# Patient Record
Sex: MALE | Race: WHITE | NOT HISPANIC OR LATINO | Employment: OTHER | URBAN - METROPOLITAN AREA
[De-identification: names, ages, dates, MRNs, and addresses within clinical notes are randomized per-mention and may not be internally consistent; named-entity substitution may affect disease eponyms.]

---

## 2017-04-19 ENCOUNTER — TELEPHONE (OUTPATIENT)
Dept: INTERNAL MEDICINE | Facility: CLINIC | Age: 82
End: 2017-04-19

## 2017-04-27 ENCOUNTER — TELEPHONE (OUTPATIENT)
Dept: INTERNAL MEDICINE | Facility: CLINIC | Age: 82
End: 2017-04-27

## 2017-05-08 DIAGNOSIS — N40.0 BPH (BENIGN PROSTATIC HYPERTROPHY): ICD-10-CM

## 2017-05-08 DIAGNOSIS — M51.9 LUMBAR DISC DISEASE: ICD-10-CM

## 2017-05-08 DIAGNOSIS — E78.5 HLD (HYPERLIPIDEMIA): ICD-10-CM

## 2017-05-08 RX ORDER — LANSOPRAZOLE 30 MG/1
TABLET, ORALLY DISINTEGRATING, DELAYED RELEASE ORAL
Qty: 90 TABLET | Refills: 2 | Status: SHIPPED | OUTPATIENT
Start: 2017-05-08 | End: 2017-08-30

## 2017-05-08 RX ORDER — CARVEDILOL PHOSPHATE 20 MG/1
CAPSULE, EXTENDED RELEASE ORAL
Qty: 90 CAPSULE | Refills: 2 | Status: SHIPPED | OUTPATIENT
Start: 2017-05-08 | End: 2017-07-10

## 2017-05-22 ENCOUNTER — TELEPHONE (OUTPATIENT)
Dept: INTERNAL MEDICINE | Facility: CLINIC | Age: 82
End: 2017-05-22

## 2017-05-22 DIAGNOSIS — N18.30 CONTROLLED TYPE 2 DIABETES MELLITUS WITH STAGE 3 CHRONIC KIDNEY DISEASE, WITH LONG-TERM CURRENT USE OF INSULIN: Primary | ICD-10-CM

## 2017-05-22 DIAGNOSIS — Z79.4 CONTROLLED TYPE 2 DIABETES MELLITUS WITH STAGE 3 CHRONIC KIDNEY DISEASE, WITH LONG-TERM CURRENT USE OF INSULIN: Primary | ICD-10-CM

## 2017-05-22 DIAGNOSIS — E11.22 CONTROLLED TYPE 2 DIABETES MELLITUS WITH STAGE 3 CHRONIC KIDNEY DISEASE, WITH LONG-TERM CURRENT USE OF INSULIN: Primary | ICD-10-CM

## 2017-06-05 DIAGNOSIS — I10 ESSENTIAL HYPERTENSION: ICD-10-CM

## 2017-06-05 RX ORDER — LOSARTAN POTASSIUM AND HYDROCHLOROTHIAZIDE 12.5; 1 MG/1; MG/1
TABLET ORAL
Qty: 90 TABLET | Refills: 2 | Status: ON HOLD | OUTPATIENT
Start: 2017-06-05 | End: 2017-06-19 | Stop reason: HOSPADM

## 2017-06-05 RX ORDER — FUROSEMIDE 40 MG/1
TABLET ORAL
Qty: 90 TABLET | Refills: 2 | Status: SHIPPED | OUTPATIENT
Start: 2017-06-05 | End: 2017-07-05 | Stop reason: SDUPTHER

## 2017-06-18 ENCOUNTER — HOSPITAL ENCOUNTER (INPATIENT)
Facility: HOSPITAL | Age: 82
LOS: 2 days | Discharge: HOME | DRG: 871 | End: 2017-06-20
Attending: INTERNAL MEDICINE | Admitting: HOSPITALIST
Payer: MEDICARE

## 2017-06-18 ENCOUNTER — HOSPITAL ENCOUNTER (EMERGENCY)
Facility: HOSPITAL | Age: 82
End: 2017-06-18
Attending: EMERGENCY MEDICINE
Payer: MEDICARE

## 2017-06-18 VITALS
TEMPERATURE: 99 F | SYSTOLIC BLOOD PRESSURE: 102 MMHG | WEIGHT: 220 LBS | RESPIRATION RATE: 20 BRPM | HEART RATE: 67 BPM | BODY MASS INDEX: 31.5 KG/M2 | HEIGHT: 70 IN | OXYGEN SATURATION: 96 % | DIASTOLIC BLOOD PRESSURE: 44 MMHG

## 2017-06-18 DIAGNOSIS — J18.9 CAP (COMMUNITY ACQUIRED PNEUMONIA): Primary | ICD-10-CM

## 2017-06-18 DIAGNOSIS — E86.0 DEHYDRATION: ICD-10-CM

## 2017-06-18 DIAGNOSIS — J92.9 PLEURAL PLAQUE: Primary | ICD-10-CM

## 2017-06-18 DIAGNOSIS — N18.3 CKD (CHRONIC KIDNEY DISEASE), STAGE 3 (MODERATE): ICD-10-CM

## 2017-06-18 DIAGNOSIS — I50.9 CHF, ACUTE ON CHRONIC: ICD-10-CM

## 2017-06-18 DIAGNOSIS — E87.5 HYPERKALEMIA: ICD-10-CM

## 2017-06-18 DIAGNOSIS — J18.9 PNEUMONIA, COMMUNITY ACQUIRED: ICD-10-CM

## 2017-06-18 LAB
ALBUMIN SERPL BCP-MCNC: 3.4 G/DL
ALP SERPL-CCNC: 73 U/L
ALT SERPL W/O P-5'-P-CCNC: 38 U/L
AMORPH CRY UR QL COMP ASSIST: NORMAL
ANION GAP SERPL CALC-SCNC: 13 MMOL/L
ANION GAP SERPL CALC-SCNC: 8 MMOL/L
ANISOCYTOSIS BLD QL SMEAR: SLIGHT
AST SERPL-CCNC: 20 U/L
BACTERIA #/AREA URNS AUTO: NORMAL /HPF
BASOPHILS # BLD AUTO: 0.01 K/UL
BASOPHILS NFR BLD: 0 %
BASOPHILS NFR BLD: 0.1 %
BILIRUB SERPL-MCNC: 0.9 MG/DL
BILIRUB UR QL STRIP: NEGATIVE
BNP SERPL-MCNC: 479 PG/ML
BUN SERPL-MCNC: 68 MG/DL
BUN SERPL-MCNC: 69 MG/DL
CALCIUM SERPL-MCNC: 8.2 MG/DL
CALCIUM SERPL-MCNC: 8.3 MG/DL
CHLORIDE SERPL-SCNC: 113 MMOL/L
CHLORIDE SERPL-SCNC: 114 MMOL/L
CLARITY UR REFRACT.AUTO: ABNORMAL
CO2 SERPL-SCNC: 17 MMOL/L
CO2 SERPL-SCNC: 20 MMOL/L
COLOR UR AUTO: YELLOW
CREAT SERPL-MCNC: 2.7 MG/DL
CREAT SERPL-MCNC: 2.7 MG/DL
DIFFERENTIAL METHOD: ABNORMAL
DIFFERENTIAL METHOD: ABNORMAL
EOSINOPHIL # BLD AUTO: 0 K/UL
EOSINOPHIL NFR BLD: 0.1 %
EOSINOPHIL NFR BLD: 1 %
ERYTHROCYTE [DISTWIDTH] IN BLOOD BY AUTOMATED COUNT: 13.3 %
ERYTHROCYTE [DISTWIDTH] IN BLOOD BY AUTOMATED COUNT: 13.4 %
EST. GFR  (AFRICAN AMERICAN): 24.6 ML/MIN/1.73 M^2
EST. GFR  (AFRICAN AMERICAN): 24.6 ML/MIN/1.73 M^2
EST. GFR  (NON AFRICAN AMERICAN): 21.3 ML/MIN/1.73 M^2
EST. GFR  (NON AFRICAN AMERICAN): 21.3 ML/MIN/1.73 M^2
GLUCOSE SERPL-MCNC: 136 MG/DL
GLUCOSE SERPL-MCNC: 188 MG/DL
GLUCOSE UR QL STRIP: NEGATIVE
HCT VFR BLD AUTO: 29.1 %
HCT VFR BLD AUTO: 31.6 %
HGB BLD-MCNC: 9.1 G/DL
HGB BLD-MCNC: 9.8 G/DL
HGB UR QL STRIP: NEGATIVE
INR PPP: 1.1
INR PPP: 1.3
KETONES UR QL STRIP: NEGATIVE
LACTATE SERPL-SCNC: 0.9 MMOL/L
LACTATE SERPL-SCNC: 1 MMOL/L
LEUKOCYTE ESTERASE UR QL STRIP: ABNORMAL
LYMPHOCYTES # BLD AUTO: 2.2 K/UL
LYMPHOCYTES NFR BLD: 10 %
LYMPHOCYTES NFR BLD: 24.8 %
MAGNESIUM SERPL-MCNC: 2.1 MG/DL
MCH RBC QN AUTO: 28.3 PG
MCH RBC QN AUTO: 28.3 PG
MCHC RBC AUTO-ENTMCNC: 31 %
MCHC RBC AUTO-ENTMCNC: 31.3 %
MCV RBC AUTO: 91 FL
MCV RBC AUTO: 91 FL
MICROSCOPIC COMMENT: NORMAL
MONOCYTES # BLD AUTO: 0.8 K/UL
MONOCYTES NFR BLD: 4 %
MONOCYTES NFR BLD: 9.2 %
NEUTROPHILS # BLD AUTO: 5.8 K/UL
NEUTROPHILS NFR BLD: 65.6 %
NEUTROPHILS NFR BLD: 85 %
NITRITE UR QL STRIP: NEGATIVE
NT-PROBNP: 6010 PG/ML
PH UR STRIP: 5 [PH] (ref 5–8)
PHOSPHATE SERPL-MCNC: 4 MG/DL
PLATELET # BLD AUTO: 169 K/UL
PLATELET # BLD AUTO: 185 K/UL
PMV BLD AUTO: 10.5 FL
PMV BLD AUTO: 11.6 FL
POCT GLUCOSE: 121 MG/DL (ref 70–110)
POCT GLUCOSE: 224 MG/DL (ref 70–110)
POIKILOCYTOSIS BLD QL SMEAR: SLIGHT
POTASSIUM SERPL-SCNC: 5 MMOL/L
POTASSIUM SERPL-SCNC: 5.3 MMOL/L
PROT SERPL-MCNC: 6.6 G/DL
PROT UR QL STRIP: NEGATIVE
PROTHROMBIN TIME: 11.9 SEC
PROTHROMBIN TIME: 14 SEC
RBC # BLD AUTO: 3.21 M/UL
RBC # BLD AUTO: 3.46 M/UL
RBC #/AREA URNS AUTO: 0 /HPF (ref 0–4)
SODIUM SERPL-SCNC: 139 MMOL/L
SODIUM SERPL-SCNC: 146 MMOL/L
SP GR UR STRIP: 1.02 (ref 1–1.03)
SQUAMOUS #/AREA URNS AUTO: 2 /HPF
TROPONIN I SERPL DL<=0.01 NG/ML-MCNC: 0.02 NG/ML
URN SPEC COLLECT METH UR: ABNORMAL
UROBILINOGEN UR STRIP-ACNC: NEGATIVE EU/DL
WBC # BLD AUTO: 14.23 K/UL
WBC # BLD AUTO: 8.87 K/UL
WBC #/AREA URNS AUTO: 2 /HPF (ref 0–5)

## 2017-06-18 PROCEDURE — 83735 ASSAY OF MAGNESIUM: CPT

## 2017-06-18 PROCEDURE — 85610 PROTHROMBIN TIME: CPT

## 2017-06-18 PROCEDURE — 25000003 PHARM REV CODE 250: Performed by: FAMILY MEDICINE

## 2017-06-18 PROCEDURE — 83880 ASSAY OF NATRIURETIC PEPTIDE: CPT | Mod: 91

## 2017-06-18 PROCEDURE — 25000003 PHARM REV CODE 250: Performed by: HOSPITALIST

## 2017-06-18 PROCEDURE — 81000 URINALYSIS NONAUTO W/SCOPE: CPT

## 2017-06-18 PROCEDURE — 80053 COMPREHEN METABOLIC PANEL: CPT

## 2017-06-18 PROCEDURE — 96375 TX/PRO/DX INJ NEW DRUG ADDON: CPT

## 2017-06-18 PROCEDURE — 99285 EMERGENCY DEPT VISIT HI MDM: CPT | Mod: 25

## 2017-06-18 PROCEDURE — 83880 ASSAY OF NATRIURETIC PEPTIDE: CPT

## 2017-06-18 PROCEDURE — 63600175 PHARM REV CODE 636 W HCPCS: Performed by: EMERGENCY MEDICINE

## 2017-06-18 PROCEDURE — 80048 BASIC METABOLIC PNL TOTAL CA: CPT

## 2017-06-18 PROCEDURE — 36415 COLL VENOUS BLD VENIPUNCTURE: CPT

## 2017-06-18 PROCEDURE — 96365 THER/PROPH/DIAG IV INF INIT: CPT

## 2017-06-18 PROCEDURE — 83036 HEMOGLOBIN GLYCOSYLATED A1C: CPT

## 2017-06-18 PROCEDURE — 63600175 PHARM REV CODE 636 W HCPCS: Performed by: FAMILY MEDICINE

## 2017-06-18 PROCEDURE — 94760 N-INVAS EAR/PLS OXIMETRY 1: CPT

## 2017-06-18 PROCEDURE — 27000221 HC OXYGEN, UP TO 24 HOURS

## 2017-06-18 PROCEDURE — 85007 BL SMEAR W/DIFF WBC COUNT: CPT

## 2017-06-18 PROCEDURE — 25000003 PHARM REV CODE 250: Performed by: EMERGENCY MEDICINE

## 2017-06-18 PROCEDURE — 11000001 HC ACUTE MED/SURG PRIVATE ROOM

## 2017-06-18 PROCEDURE — 63600175 PHARM REV CODE 636 W HCPCS: Performed by: HOSPITALIST

## 2017-06-18 PROCEDURE — 83605 ASSAY OF LACTIC ACID: CPT

## 2017-06-18 PROCEDURE — 87086 URINE CULTURE/COLONY COUNT: CPT

## 2017-06-18 PROCEDURE — 85610 PROTHROMBIN TIME: CPT | Mod: 91

## 2017-06-18 PROCEDURE — 83605 ASSAY OF LACTIC ACID: CPT | Mod: 91

## 2017-06-18 PROCEDURE — 82962 GLUCOSE BLOOD TEST: CPT

## 2017-06-18 PROCEDURE — 84100 ASSAY OF PHOSPHORUS: CPT

## 2017-06-18 PROCEDURE — 85025 COMPLETE CBC W/AUTO DIFF WBC: CPT

## 2017-06-18 PROCEDURE — 87040 BLOOD CULTURE FOR BACTERIA: CPT | Mod: 59

## 2017-06-18 PROCEDURE — 96361 HYDRATE IV INFUSION ADD-ON: CPT

## 2017-06-18 PROCEDURE — 87040 BLOOD CULTURE FOR BACTERIA: CPT

## 2017-06-18 PROCEDURE — 84484 ASSAY OF TROPONIN QUANT: CPT

## 2017-06-18 PROCEDURE — 85027 COMPLETE CBC AUTOMATED: CPT

## 2017-06-18 RX ORDER — SODIUM CHLORIDE 9 MG/ML
1000 INJECTION, SOLUTION INTRAVENOUS
Status: COMPLETED | OUTPATIENT
Start: 2017-06-18 | End: 2017-06-18

## 2017-06-18 RX ORDER — CEFEPIME HYDROCHLORIDE 1 G/50ML
1 INJECTION, SOLUTION INTRAVENOUS
Status: COMPLETED | OUTPATIENT
Start: 2017-06-18 | End: 2017-06-18

## 2017-06-18 RX ORDER — EZETIMIBE AND SIMVASTATIN 10; 20 MG/1; MG/1
1 TABLET ORAL NIGHTLY
Status: DISCONTINUED | OUTPATIENT
Start: 2017-06-18 | End: 2017-06-20 | Stop reason: HOSPADM

## 2017-06-18 RX ORDER — INSULIN ASPART 100 [IU]/ML
0-5 INJECTION, SOLUTION INTRAVENOUS; SUBCUTANEOUS
Status: DISCONTINUED | OUTPATIENT
Start: 2017-06-18 | End: 2017-06-20 | Stop reason: HOSPADM

## 2017-06-18 RX ORDER — IBUPROFEN 200 MG
16 TABLET ORAL
Status: DISCONTINUED | OUTPATIENT
Start: 2017-06-18 | End: 2017-06-20 | Stop reason: HOSPADM

## 2017-06-18 RX ORDER — CARVEDILOL 12.5 MG/1
12.5 TABLET ORAL 2 TIMES DAILY
Status: DISCONTINUED | OUTPATIENT
Start: 2017-06-18 | End: 2017-06-18

## 2017-06-18 RX ORDER — SODIUM CHLORIDE 0.9 % (FLUSH) 0.9 %
3 SYRINGE (ML) INJECTION EVERY 8 HOURS
Status: DISCONTINUED | OUTPATIENT
Start: 2017-06-19 | End: 2017-06-20 | Stop reason: HOSPADM

## 2017-06-18 RX ORDER — IBUPROFEN 200 MG
24 TABLET ORAL
Status: DISCONTINUED | OUTPATIENT
Start: 2017-06-18 | End: 2017-06-20 | Stop reason: HOSPADM

## 2017-06-18 RX ORDER — GLUCAGON 1 MG
1 KIT INJECTION
Status: DISCONTINUED | OUTPATIENT
Start: 2017-06-18 | End: 2017-06-20 | Stop reason: HOSPADM

## 2017-06-18 RX ADMIN — CEFEPIME HYDROCHLORIDE 1 G: 1 INJECTION, SOLUTION INTRAVENOUS at 06:06

## 2017-06-18 RX ADMIN — SODIUM CHLORIDE 1000 ML: 0.9 INJECTION, SOLUTION INTRAVENOUS at 06:06

## 2017-06-18 RX ADMIN — SODIUM CHLORIDE 1000 ML: 0.9 INJECTION, SOLUTION INTRAVENOUS at 05:06

## 2017-06-18 RX ADMIN — EZETIMIBE AND SIMVASTATIN 1 TABLET: 10; 20 TABLET ORAL at 11:06

## 2017-06-18 RX ADMIN — INSULIN DETEMIR 30 UNITS: 100 INJECTION, SOLUTION SUBCUTANEOUS at 11:06

## 2017-06-18 RX ADMIN — AZITHROMYCIN MONOHYDRATE 500 MG: 500 INJECTION, POWDER, LYOPHILIZED, FOR SOLUTION INTRAVENOUS at 06:06

## 2017-06-18 NOTE — ED NOTES
Notified Will from [facility] regarding need for tele bed at [facility] per pt request

## 2017-06-18 NOTE — PLAN OF CARE
Please call extension [phone] (if nobody answers, this will flip to a beeper, so put in your call back number) upon patient arrival to floor for Hospital Medicine admit team assignment and for additional admit orders for the patient.      Outside Transfer Acceptance Note     Patients name: Joseph Valerio    Transferring Physician or Mid-Level provider/Clinic giving report:   Dr. Johnson, ED physician at [facility] ED    Accepting Physician for admission to hospital: Hamlet Loaiza M.D.      Date of acceptance:  6/18/17     Reason for transfer:  Freestanding ED and patient requests [facility]  Dx: Bilateral pneumonia    Report from Physician/Mid-Level Provider:  Mr. Valerio is an 82yo man with a past medical history of DM2 and HTN.  His son brought him to the ED due to altered mental status.  He was also found to have hyperglycemia at home with congestion.  He does not live in a nursing home and has not been admitted to the hospital in the past 90 days.    He was found to have bilateral pneumonia on CXR with leukocytosis and low grade fever.  He was labeled as HCAP there and treated as such with Vancomycin and Cefepime.  On further review with the ED doctor, it seems this is rather CAP, so I asked them to add Zithromax 500mg as well to continue with standard therapy with Rocephin and Zithro going forward.      He is in some JOHN PAUL on CKD 3, for which he was given a liter of NS.    VS:  Temp:  [99.3 °F (37.4 °C)]   Pulse:  [66-78]   Resp:  [16-20]   BP: (101-107)/(49-50)   SpO2:  [93 %-97 %]      LABS:  Recent Results (from the past 24 hour(s))   POCT glucose    Collection Time: 06/18/17  4:28 PM   Result Value Ref Range    POCT Glucose 224 (H) 70 - 110 mg/dL   CBC auto differential    Collection Time: 06/18/17  4:58 PM   Result Value Ref Range    WBC 14.23 (H) 3.90 - 12.70 K/uL    RBC 3.46 (L) 4.60 - 6.20 M/uL    Hemoglobin 9.8 (L) 14.0 - 18.0 g/dL    Hematocrit 31.6 (L) 40.0 - 54.0 %    MCV 91 82 - 98 fL    MCH  28.3 27.0 - 31.0 pg    MCHC 31.0 (L) 32.0 - 36.0 %    RDW 13.3 11.5 - 14.5 %    Platelets 185 150 - 350 K/uL    MPV 11.6 9.2 - 12.9 fL    Gran # 11.3 (H) 1.8 - 7.7 K/uL    Lymph # 1.7 1.0 - 4.8 K/uL    Mono # 1.1 (H) 0.3 - 1.0 K/uL    Eos # 0.0 0.0 - 0.5 K/uL    Baso # 0.03 0.00 - 0.20 K/uL    Gran% 79.5 (H) 38.0 - 73.0 %    Lymph% 12.2 (L) 18.0 - 48.0 %    Mono% 7.8 4.0 - 15.0 %    Eosinophil% 0.0 0.0 - 8.0 %    Basophil% 0.2 0.0 - 1.9 %    Differential Method Automated    Comprehensive metabolic panel    Collection Time: 06/18/17  4:58 PM   Result Value Ref Range    Sodium 146 (H) 136 - 145 mmol/L    Potassium 5.3 (H) 3.5 - 5.1 mmol/L    Chloride 113 (H) 95 - 110 mmol/L    CO2 20 (L) 23 - 29 mmol/L    Glucose 188 (H) 70 - 110 mg/dL    BUN, Bld 68 (H) 2 - 20 mg/dL    Creatinine 2.70 (H) 0.50 - 1.40 mg/dL    Calcium 8.3 (L) 8.7 - 10.5 mg/dL    Total Protein 6.6 6.0 - 8.4 g/dL    Albumin 3.4 (L) 3.5 - 5.2 g/dL    Total Bilirubin 0.9 0.1 - 1.0 mg/dL    Alkaline Phosphatase 73 38 - 126 U/L    AST 20 15 - 46 U/L    ALT 38 10 - 44 U/L    Anion Gap 13 8 - 16 mmol/L    eGFR if African American 24.6 (A) >60 mL/min/1.73 m^2    eGFR if non  21.3 (A) >60 mL/min/1.73 m^2   Protime-INR    Collection Time: 06/18/17  4:58 PM   Result Value Ref Range    Prothrombin Time 14.0 (H) 9.0 - 12.5 sec    INR 1.3 (H) 0.8 - 1.2   Troponin I    Collection Time: 06/18/17  4:58 PM   Result Value Ref Range    Troponin I 0.017 0.012 - 0.034 ng/mL   NT-Pro Natriuretic Peptide    Collection Time: 06/18/17  4:58 PM   Result Value Ref Range    NT-proBNP 6010 (H) 5 - 1800 pg/mL   Lactic acid, plasma #1    Collection Time: 06/18/17  5:36 PM   Result Value Ref Range    Lactate (Lactic Acid) 1.0 0.5 - 2.2 mmol/L       RADIOGRAPHS:  X-Ray Chest AP Portable 06/18/2017 sepsis          RESULTS:  Exam: XR CHEST AP PORTABLE     Clinical History: Shortness of breath. sepsis     Findings:     Patchy alveolar opacities in the bilateral lower lobes  and to a lesser degree the bilateral upper lobes, right greater the left. Moderate cardiomegaly.  Aortic atherosclerosis.  IMPRESSION:    Bilateral infiltrates as above.              Electronically signed by:           KENA VUONG MD  Date:                                                                                    06/18/17  Time:                                                                                   17:22        Signed By:  Kena Vuong MD on 6/18/2017  5:22 PM              CT Head Without Contrast 06/18/2017 confusion          RESULTS:  Exam:  CT HEAD WITHOUT CONTRAST     Clinical History:  Altered mental status. confusion     Technique: Axial CT imaging was performed through the head without intravenous contrast.      Comparison: MRI brain on November 24, 2014     Findings:     Age appropriate generalized cerebral and cerebellar atrophy. Mild, symmetric, low density changes in the periventricular white matter consistent with chronic small vessel ischemic change.   No intracranial hemorrhage is identified.   No hydrocephalus, midline shift or mass effect.  The calvarium is intact.   Visualized paranasal sinuses and mastoid air cells are clear.   Atheromatous calcifications involve the bilateral cavernous carotid arteries.  IMPRESSION:      No CT evidence of acute intracranial abnormality.  Senescent changes.     All CT scans at this facility use dose modulation, iterative reconstruction and/or weight based dosing when appropriate to reduce radiation dose to as low as reasonably achievable.         Electronically signed by:           KENA VUONG MD  Date:                                                                                    06/18/17  Time:                                                                                   17:24        Signed By:  Kena Vuong MD on 6/18/2017  5:24 PM            2D echo with cfd:  CONCLUSIONS     1 - Normal left ventricular systolic function (EF  60-65%).     2 - Left ventricular diastolic dysfunction.     3 - Biatrial enlargement.     4 - Right ventricle is upper limit of normal in size with normal systolic function.     5 - Trivial pericardial effusion.     6 - Doppler if clinically indicated.   This document has been electronically    SIGNED BY: Madhu Santamaria MD On: 10/27/2014 09:49    To Do List upon arrival:  Continue antibiotics - Rocephin and Zithromax  Will need to investigate the elevated BNP as well, as his last Echo was 2014  Patient placed into telemetry bed  Repeat BMP stat to follow up Cr and K post fluids    Please call extension [phone] (if nobody answers, this will flip to a beeper, so put in your call back number) upon patient arrival to floor for Hospital Medicine admit team assignment and for additional admit orders for the patient.

## 2017-06-18 NOTE — ED PROVIDER NOTES
"Encounter Date: 6/18/2017       History     Chief Complaint   Patient presents with    Altered Mental Status     Son reports pt is just not acting right. Son reports is hard to arouse. Son does report pt did fall this am.     Review of patient's allergies indicates:  No Known Allergies  81-year-old male was brought in by his son with concern for confusion and "not acting right since this morning".  Son reports he lives alone went over this morning and noted it is not acting himself and was difficult to arouse.  He was shaking him, and was having a hard time getting up.  He is normally alert and oriented ×3.  He does have history of diabetes, and his son gave him his insulin thinking his blood sugar was too high.  His blood sugar was 222 on arrival.  No reports of cough congestion.  He is on blood thinners for atrial fibrillation, and according to son he did have a fall recently.  Patient tells me his name, he correctly identifies it's a holiday, Father's Day, and identifies what state we are in.           Past Medical History:   Diagnosis Date    *Atrial fibrillation     Allergy     Aortic sclerosis     BPH (benign prostatic hypertrophy)     Calcium nephrolithiasis     CKD (chronic kidney disease), stage III 10/16/2014    Colon polyps     Coronary artery disease     CPAP (continuous positive airway pressure) dependence     Degenerative disc disease     Diabetes mellitus     Hiatal hernia     Hyperlipidemia     Hyperprolactinemia 12/29/2014    Hypertension     Peripheral vascular disease     Proteinuria     Renal insufficiency     Sleep apnea     Venous insufficiency      Past Surgical History:   Procedure Laterality Date    CATARACT EXTRACTION, BILATERAL      CHOLECYSTECTOMY      GASTRIC BYPASS       History reviewed. No pertinent family history.  Social History   Substance Use Topics    Smoking status: Former Smoker     Packs/day: 4.00     Years: 30.00     Quit date: 1/9/1990    Smokeless " "tobacco: Not on file      Comment: pt was up to 4 packs a day before.    Alcohol use No     Review of Systems   Unable to perform ROS: Mental status change   Constitutional: Negative for fever.   Genitourinary: Negative.        Physical Exam     Initial Vitals [06/18/17 1631]   BP Pulse Resp Temp SpO2   (!) 107/50 78 16 -- 97 %     Physical Exam    Nursing note and vitals reviewed.  Constitutional: He appears well-developed and well-nourished. He appears lethargic.   HENT:   Head: Normocephalic and atraumatic.   Right Ear: External ear normal.   Left Ear: External ear normal.   Eyes: Pupils are equal, round, and reactive to light.   Neck: Neck supple.   Cardiovascular: Normal heart sounds.   Hypotensive /50   Abdominal: Soft. He exhibits distension. There is no tenderness. There is no rebound.   Musculoskeletal:   Has leg swelling diffusely, left more than right  When I ask does he always have one leg more swollen than another, he says "yes! My left one"  His son corroborates this  He has diminished DP pulses bilaterally with edema   Neurological: He appears lethargic. No cranial nerve deficit. GCS eye subscore is 3. GCS verbal subscore is 4. GCS motor subscore is 6.   He is slow to answer, but tells me his name, identifies that it's not Mehul, or Mother's day, but Father's day  Is diffusely weak, but helps pull himself up into the seated position   Skin: Skin is warm.         ED Course   Critical Care  Date/Time: 6/18/2017 5:30 PM  Performed by: JAVON MOTA  Authorized by: JAVON MOTA   Direct patient critical care time: 15 minutes  Additional history critical care time: 8 minutes  Ordering / reviewing critical care time: 8 minutes  Documentation critical care time: 8 minutes  Consulting other physicians critical care time: 7 minutes  Total critical care time (exclusive of procedural time) : 46 minutes  Critical care was necessary to treat or prevent imminent or life-threatening deterioration " of the following conditions: sepsis.  Critical care was time spent personally by me on the following activities: discussions with primary provider, discussions with consultants, interpretation of cardiac output measurements, evaluation of patient's response to treatment, examination of patient, obtaining history from patient or surrogate, ordering and performing treatments and interventions, ordering and review of laboratory studies, ordering and review of radiographic studies, pulse oximetry, re-evaluation of patient's condition and review of old charts.        Labs Reviewed   COMPREHENSIVE METABOLIC PANEL - Abnormal; Notable for the following:        Result Value    Sodium 146 (*)     Potassium 5.3 (*)     Chloride 113 (*)     CO2 20 (*)     Glucose 188 (*)     BUN, Bld 68 (*)     Creatinine 2.70 (*)     Calcium 8.3 (*)     Albumin 3.4 (*)     eGFR if  24.6 (*)     eGFR if non  21.3 (*)     All other components within normal limits   NT-PRO NATRIURETIC PEPTIDE - Abnormal; Notable for the following:     NT-proBNP 6010 (*)     All other components within normal limits   POCT GLUCOSE - Abnormal; Notable for the following:     POCT Glucose 224 (*)     All other components within normal limits   CULTURE, BLOOD   CULTURE, BLOOD   CULTURE, URINE   LACTIC ACID, PLASMA   TROPONIN I   CBC W/ AUTO DIFFERENTIAL   PROTIME-INR   URINALYSIS             Medical Decision Making:   Initial Assessment:   82 yo M here with confusion x 1 day, non focal exam and history of falls, appears infectious/metabolic confusion  Differential Diagnosis:   Metabolic, organ dysfunction, anemia, infection, electrolyte derangement- ICH less likely , but given falls and anticoagulants, will get HCT    Gentle fluids for soft BP, but patient appears with LE edema    Clinical Tests:   Lab Tests: Ordered and Reviewed  Radiological Study: Ordered and Reviewed  Medical Tests: Ordered and Reviewed  ED Management:  Patient  given IVF for sepsis, antibiotics for coverage of MRSA  Cultured    Patient signed out at 6PM, awaiting results of blood work and final disposition. Suspect patient will need transfer for inpatient stay for encephelopathy and pneumonia    Other:   I have discussed this case with another health care provider.       <> Summary of the Discussion: PT STAYS AT HOME. NO RECENT HOSPITAL ADMISSION.  ADVISED FOR ZITHROMAX. ACCEPTED FOR TRANSFER TO [facility].    Additional MDM:   Pneumonia: Status: The patient's pulse oximetry was normal (%). Blood Cultures: 2 blood cultures were done before antibiotics.  The patient has a hospital acquired pneumonia and is being treated with Cefepime 1 g IVPB and Vancomycin 1 g IVPB. Antibiotic selection is chosen due to pseudomonas and MRSA risk. Condition: The patient's condition was felt to be guarded.                 ED Course     Clinical Impression:   The primary encounter diagnosis was CAP (community acquired pneumonia). Diagnoses of CKD (chronic kidney disease), stage 3 (moderate), Hyperkalemia, and Dehydration were also pertinent to this visit.    Disposition:   Disposition: Transferred  Condition: Fair  TO [facility].       Luiz Johnson MD  06/21/17 0921

## 2017-06-18 NOTE — ED TRIAGE NOTES
Son reports pt is just not acting right. Son reports is hard to arouse. Son does report pt did fall this am.

## 2017-06-19 PROBLEM — R65.20 SEVERE SEPSIS: Status: ACTIVE | Noted: 2017-06-19

## 2017-06-19 PROBLEM — G92.9 ENCEPHALOPATHY, TOXIC: Status: ACTIVE | Noted: 2017-06-19

## 2017-06-19 PROBLEM — G93.40 ENCEPHALOPATHY: Status: ACTIVE | Noted: 2017-06-19

## 2017-06-19 PROBLEM — J96.01 ACUTE HYPOXEMIC RESPIRATORY FAILURE: Status: ACTIVE | Noted: 2017-06-19

## 2017-06-19 PROBLEM — A41.9 SEVERE SEPSIS: Status: ACTIVE | Noted: 2017-06-19

## 2017-06-19 PROBLEM — N17.9 AKI (ACUTE KIDNEY INJURY): Status: ACTIVE | Noted: 2017-06-19

## 2017-06-19 LAB
ALBUMIN SERPL BCP-MCNC: 2.8 G/DL
ALP SERPL-CCNC: 79 U/L
ALT SERPL W/O P-5'-P-CCNC: 23 U/L
ANION GAP SERPL CALC-SCNC: 12 MMOL/L
ANION GAP SERPL CALC-SCNC: 6 MMOL/L
AST SERPL-CCNC: 17 U/L
BASOPHILS # BLD AUTO: 0.02 K/UL
BASOPHILS NFR BLD: 0.2 %
BILIRUB SERPL-MCNC: 0.8 MG/DL
BUN SERPL-MCNC: 71 MG/DL
BUN SERPL-MCNC: 72 MG/DL
CALCIUM SERPL-MCNC: 8.5 MG/DL
CALCIUM SERPL-MCNC: 8.6 MG/DL
CHLORIDE SERPL-SCNC: 113 MMOL/L
CHLORIDE SERPL-SCNC: 115 MMOL/L
CO2 SERPL-SCNC: 14 MMOL/L
CO2 SERPL-SCNC: 21 MMOL/L
CREAT SERPL-MCNC: 2.5 MG/DL
CREAT SERPL-MCNC: 2.9 MG/DL
CRP SERPL-MCNC: 102.9 MG/L
DIFFERENTIAL METHOD: ABNORMAL
EOSINOPHIL # BLD AUTO: 0.1 K/UL
EOSINOPHIL NFR BLD: 0.6 %
ERYTHROCYTE [DISTWIDTH] IN BLOOD BY AUTOMATED COUNT: 13.4 %
ERYTHROCYTE [SEDIMENTATION RATE] IN BLOOD BY WESTERGREN METHOD: 40 MM/HR
EST. GFR  (AFRICAN AMERICAN): 22.6 ML/MIN/1.73 M^2
EST. GFR  (AFRICAN AMERICAN): 27 ML/MIN/1.73 M^2
EST. GFR  (NON AFRICAN AMERICAN): 19.5 ML/MIN/1.73 M^2
EST. GFR  (NON AFRICAN AMERICAN): 23.4 ML/MIN/1.73 M^2
ESTIMATED AVG GLUCOSE: 140 MG/DL
GLUCOSE SERPL-MCNC: 105 MG/DL
GLUCOSE SERPL-MCNC: 149 MG/DL
HBA1C MFR BLD HPLC: 6.5 %
HCT VFR BLD AUTO: 30 %
HGB BLD-MCNC: 9.2 G/DL
LYMPHOCYTES # BLD AUTO: 1.8 K/UL
LYMPHOCYTES NFR BLD: 21 %
MAGNESIUM SERPL-MCNC: 2.5 MG/DL
MCH RBC QN AUTO: 28 PG
MCHC RBC AUTO-ENTMCNC: 30.7 %
MCV RBC AUTO: 92 FL
MONOCYTES # BLD AUTO: 0.9 K/UL
MONOCYTES NFR BLD: 10.5 %
NEUTROPHILS # BLD AUTO: 5.8 K/UL
NEUTROPHILS NFR BLD: 67.3 %
PHOSPHATE SERPL-MCNC: 4.3 MG/DL
PLATELET # BLD AUTO: 172 K/UL
PMV BLD AUTO: 11.5 FL
POCT GLUCOSE: 121 MG/DL (ref 70–110)
POCT GLUCOSE: 227 MG/DL (ref 70–110)
POCT GLUCOSE: 71 MG/DL (ref 70–110)
POTASSIUM SERPL-SCNC: 5 MMOL/L
POTASSIUM SERPL-SCNC: 5.1 MMOL/L
PROCALCITONIN SERPL IA-MCNC: 1.56 NG/ML
PROT SERPL-MCNC: 6.2 G/DL
RBC # BLD AUTO: 3.28 M/UL
SODIUM SERPL-SCNC: 140 MMOL/L
SODIUM SERPL-SCNC: 141 MMOL/L
WBC # BLD AUTO: 8.56 K/UL

## 2017-06-19 PROCEDURE — 97530 THERAPEUTIC ACTIVITIES: CPT

## 2017-06-19 PROCEDURE — 80053 COMPREHEN METABOLIC PANEL: CPT

## 2017-06-19 PROCEDURE — 78582 LUNG VENTILAT&PERFUS IMAGING: CPT

## 2017-06-19 PROCEDURE — 84100 ASSAY OF PHOSPHORUS: CPT

## 2017-06-19 PROCEDURE — 83735 ASSAY OF MAGNESIUM: CPT

## 2017-06-19 PROCEDURE — A9540 TC99M MAA: HCPCS

## 2017-06-19 PROCEDURE — 99900035 HC TECH TIME PER 15 MIN (STAT)

## 2017-06-19 PROCEDURE — 85651 RBC SED RATE NONAUTOMATED: CPT

## 2017-06-19 PROCEDURE — 97535 SELF CARE MNGMENT TRAINING: CPT

## 2017-06-19 PROCEDURE — 36415 COLL VENOUS BLD VENIPUNCTURE: CPT

## 2017-06-19 PROCEDURE — 97161 PT EVAL LOW COMPLEX 20 MIN: CPT

## 2017-06-19 PROCEDURE — 84145 PROCALCITONIN (PCT): CPT

## 2017-06-19 PROCEDURE — 80048 BASIC METABOLIC PNL TOTAL CA: CPT

## 2017-06-19 PROCEDURE — 99223 1ST HOSP IP/OBS HIGH 75: CPT | Mod: AI,GC,, | Performed by: HOSPITALIST

## 2017-06-19 PROCEDURE — 11000001 HC ACUTE MED/SURG PRIVATE ROOM

## 2017-06-19 PROCEDURE — 25000003 PHARM REV CODE 250: Performed by: HOSPITALIST

## 2017-06-19 PROCEDURE — 27000190 HC CPAP FULL FACE MASK W/VALVE

## 2017-06-19 PROCEDURE — 63600175 PHARM REV CODE 636 W HCPCS: Performed by: HOSPITALIST

## 2017-06-19 PROCEDURE — 85025 COMPLETE CBC W/AUTO DIFF WBC: CPT

## 2017-06-19 PROCEDURE — 97165 OT EVAL LOW COMPLEX 30 MIN: CPT

## 2017-06-19 PROCEDURE — 86140 C-REACTIVE PROTEIN: CPT

## 2017-06-19 PROCEDURE — 63600175 PHARM REV CODE 636 W HCPCS: Performed by: STUDENT IN AN ORGANIZED HEALTH CARE EDUCATION/TRAINING PROGRAM

## 2017-06-19 RX ORDER — ASPIRIN 81 MG/1
81 TABLET ORAL DAILY
Status: DISCONTINUED | OUTPATIENT
Start: 2017-06-20 | End: 2017-06-20 | Stop reason: HOSPADM

## 2017-06-19 RX ORDER — GUAIFENESIN 600 MG/1
600 TABLET, EXTENDED RELEASE ORAL 2 TIMES DAILY
Status: DISCONTINUED | OUTPATIENT
Start: 2017-06-19 | End: 2017-06-20 | Stop reason: HOSPADM

## 2017-06-19 RX ORDER — PANTOPRAZOLE SODIUM 40 MG/1
40 TABLET, DELAYED RELEASE ORAL DAILY
Status: DISCONTINUED | OUTPATIENT
Start: 2017-06-19 | End: 2017-06-20 | Stop reason: HOSPADM

## 2017-06-19 RX ORDER — GABAPENTIN 300 MG/1
300 CAPSULE ORAL NIGHTLY
Status: DISCONTINUED | OUTPATIENT
Start: 2017-06-20 | End: 2017-06-20 | Stop reason: HOSPADM

## 2017-06-19 RX ORDER — LANOLIN ALCOHOL/MO/W.PET/CERES
1000 CREAM (GRAM) TOPICAL DAILY
Status: DISCONTINUED | OUTPATIENT
Start: 2017-06-19 | End: 2017-06-20 | Stop reason: HOSPADM

## 2017-06-19 RX ORDER — TAMSULOSIN HYDROCHLORIDE 0.4 MG/1
0.4 CAPSULE ORAL DAILY
Status: DISCONTINUED | OUTPATIENT
Start: 2017-06-19 | End: 2017-06-20 | Stop reason: HOSPADM

## 2017-06-19 RX ORDER — MOXIFLOXACIN HYDROCHLORIDE 400 MG/1
400 TABLET ORAL DAILY
Qty: 5 TABLET | Refills: 0 | Status: SHIPPED | OUTPATIENT
Start: 2017-06-19 | End: 2017-06-24

## 2017-06-19 RX ORDER — CARVEDILOL 6.25 MG/1
6.25 TABLET ORAL 2 TIMES DAILY
Status: DISCONTINUED | OUTPATIENT
Start: 2017-06-19 | End: 2017-06-20 | Stop reason: HOSPADM

## 2017-06-19 RX ORDER — FUROSEMIDE 10 MG/ML
80 INJECTION INTRAMUSCULAR; INTRAVENOUS ONCE
Status: COMPLETED | OUTPATIENT
Start: 2017-06-19 | End: 2017-06-19

## 2017-06-19 RX ORDER — IPRATROPIUM BROMIDE 42 UG/1
2 SPRAY, METERED NASAL 2 TIMES DAILY
Status: DISCONTINUED | OUTPATIENT
Start: 2017-06-19 | End: 2017-06-20 | Stop reason: HOSPADM

## 2017-06-19 RX ADMIN — SODIUM CHLORIDE, PRESERVATIVE FREE 3 ML: 5 INJECTION INTRAVENOUS at 04:06

## 2017-06-19 RX ADMIN — GUAIFENESIN 600 MG: 600 TABLET, EXTENDED RELEASE ORAL at 09:06

## 2017-06-19 RX ADMIN — CARVEDILOL 6.25 MG: 6.25 TABLET, FILM COATED ORAL at 09:06

## 2017-06-19 RX ADMIN — CYANOCOBALAMIN TAB 1000 MCG 1000 MCG: 1000 TAB at 10:06

## 2017-06-19 RX ADMIN — IPRATROPIUM BROMIDE 2 SPRAY: 42 SPRAY NASAL at 09:06

## 2017-06-19 RX ADMIN — SODIUM CHLORIDE, PRESERVATIVE FREE 3 ML: 5 INJECTION INTRAVENOUS at 09:06

## 2017-06-19 RX ADMIN — Medication 500 MG: at 07:06

## 2017-06-19 RX ADMIN — PANTOPRAZOLE SODIUM 40 MG: 40 TABLET, DELAYED RELEASE ORAL at 10:06

## 2017-06-19 RX ADMIN — TAMSULOSIN HYDROCHLORIDE 0.4 MG: 0.4 CAPSULE ORAL at 10:06

## 2017-06-19 RX ADMIN — EZETIMIBE AND SIMVASTATIN 1 TABLET: 10; 20 TABLET ORAL at 09:06

## 2017-06-19 RX ADMIN — FUROSEMIDE 80 MG: 10 INJECTION, SOLUTION INTRAVENOUS at 04:06

## 2017-06-19 RX ADMIN — CARVEDILOL 6.25 MG: 6.25 TABLET, FILM COATED ORAL at 10:06

## 2017-06-19 RX ADMIN — RIVAROXABAN 15 MG: 15 TABLET, FILM COATED ORAL at 04:06

## 2017-06-19 RX ADMIN — CEFTRIAXONE 1 G: 1 INJECTION, SOLUTION INTRAVENOUS at 09:06

## 2017-06-19 RX ADMIN — INSULIN DETEMIR 30 UNITS: 100 INJECTION, SOLUTION SUBCUTANEOUS at 09:06

## 2017-06-19 NOTE — ASSESSMENT & PLAN NOTE
- initially hyperglycemia ~220 in ED  - stable at this time  - decrease his long acting insulin to 30 U  - checking A1C, SSI for now, with room to increase

## 2017-06-19 NOTE — ASSESSMENT & PLAN NOTE
- possible JOHN PAUL on CKD stage 3  - hyperkalemia improved with hydration  - patient with metabolic acidosis from chronic renal disease  - patient with BUN/cr 68/2.7 that did not improved with IV hydration  - at this time suspect this is more chronic  - hold ARB/HTCZ/ lasix medications  - check FeNa/FeUrea  - will hold off IVF for now  - monitor I/Os

## 2017-06-19 NOTE — DISCHARGE INSTRUCTIONS
Please discontinue losartan-hydrochlorothiazide 100-12.5 mg 100-12.5 mg Tab   Commonly known as: HYZAAR because of your kidney function and follow up with your primary care physician to check your kidney function and resume the medication if needed.

## 2017-06-19 NOTE — ASSESSMENT & PLAN NOTE
- BP is low-normal tensive at this time  - will hold ARB/HCTZ  - will continue coreg but can change to metoprolol to help with afib if BP continue to be low  - can consider stopping flomax (alpha blocker)

## 2017-06-19 NOTE — PLAN OF CARE
Problem: Occupational Therapy Goal  Goal: Occupational Therapy Goal  Goals to be met by: 6/26/17    Patient will increase functional independence with ADLs by performing:    LE Dressing with Set-up Assistance.  Grooming while standing at sink with Modified [place].  Toileting from toilet with Modified [place] for hygiene and clothing management.   Supine to sit with [place].  Stand pivot transfers with Modified [place].  Toilet transfer to toilet with Modified [place].    Outcome: Ongoing (interventions implemented as appropriate)  Evaluation completed and POC established.    MILLER Haynes

## 2017-06-19 NOTE — H&P
"[facility] Medicine  History & Physical    Patient Name: Joseph Valerio  MRN: [id number]  Admission Date: 6/18/2017  Attending Physician: Karina Bang MD   Primary Care Provider: Thompson Stiles MD    [facility] Medicine Team: [facility] 3 Juni Friedman MD     Patient information was obtained from patient, past medical records and ER records.     Subjective:     Principal Problem:Acute hypoxemic respiratory failure    Chief Complaint: PNA, AMS       HPI: Joseph Valerio is a 81 y.o. male with medical history of Afib, CKD stage 3, DM2, HTN, Sleep apnea, HLD who was brought in by his son with concern for confusion/AMS and "not acting right since this morning" at a freestanding ED and patient requests transfer to [facility] after finding to have possible bilateral pneumonia. Per son he was also found to have hyperglycemia at home with congestion. IV abx started at the ED with Vanc/Cefepime/Azithromycin for initially HCAP but later treated as CAP. AMS appeared to have resolved AOX3 at this time. He was found to have possible JOHN PAUL, was give 1 liter of NS. Upon transfer patient was placed on 2L NC due to desating. Patient himself reported no cough, no congestion, no fever, no chills, no sick contact or aspiration event. But he is not aware of some of his home medications. Chart review showed that he is on blood thinners for atrial fibrillation but he denies taking Xarelto. He report living at home by himself, taking all his medications and able to do all ADLs. Son was not present upon transfer. BNP was elevated with concern for CHF.       Past Medical History:   Diagnosis Date    *Atrial fibrillation     Allergy     Aortic sclerosis     BPH (benign prostatic hypertrophy)     Calcium nephrolithiasis     CKD (chronic kidney disease), stage III 10/16/2014    Colon polyps     Coronary artery disease     CPAP (continuous positive airway pressure) dependence     Degenerative disc " disease     Diabetes mellitus     Hiatal hernia     Hyperlipidemia     Hyperprolactinemia 12/29/2014    Hypertension     Peripheral vascular disease     Proteinuria     Renal insufficiency     Sleep apnea     Venous insufficiency        Past Surgical History:   Procedure Laterality Date    CATARACT EXTRACTION, BILATERAL      CHOLECYSTECTOMY      GASTRIC BYPASS         Review of patient's allergies indicates:  No Known Allergies    Current Facility-Administered Medications on File Prior to Encounter   Medication    [COMPLETED] 0.9%  NaCl infusion    [COMPLETED] 0.9%  NaCl infusion    [COMPLETED] azithromycin 500 mg in dextrose 5 % 250 mL IVPB (ready to mix system)    [COMPLETED] cefepime in dextrose 5 % 1 gram/50 mL IVPB 1 g    [DISCONTINUED] moxifloxacin 400 mg/250 mL IVPB 400 mg    [DISCONTINUED] vancomycin (VANCOCIN) 1,500 mg in dextrose 5 % 250 mL IVPB     Current Outpatient Prescriptions on File Prior to Encounter   Medication Sig    acetaminophen (TYLENOL ARTHRITIS PAIN) 650 MG TbSR Take 1 tablet (650 mg total) by mouth every 8 (eight) hours. 1 tab 2-3x/day for arthritis pain    azelastine-fluticasone 137-50 mcg/spray Spry nassal spray 1 spray by Each Nare route 2 (two) times daily.    blood sugar diagnostic Strp Check glucose 2-3x/day before meals and/or bedtime    CARVEDILOL PHOSPHATE 20 MG ORAL CM24 (COREG CR) 20 mg 24 hr capsule Take 1 capsule (20 mg total) by mouth once daily. 1 Cap, Multiphasic Release 24 hr Oral Every day    CARVEDILOL PHOSPHATE 20 MG ORAL CM24 (COREG CR) 20 mg 24 hr capsule Take 1 capsule (20 mg total) by mouth once daily.    cholestyramine (QUESTRAN) 4 gram packet 1 packet up to daily for Bowel Movements/Diarrhea prevention    COREG CR 20 mg 24 hr capsule TAKE 1 CAPSULE DAILY    cyanocobalamin, vitamin B-12, 1,000 mcg TbSR     ezetimibe-simvastatin 10-20 mg (VYTORIN 10-20) 10-20 mg per tablet Take 1 tablet by mouth every evening. 1 Tablet Oral At bedtime.   for cholesterol    ferrous sulfate dried (SLOW FE) 160 mg (50 mg iron) TbSR Take 1 tablet (160 mg total) by mouth once daily.    furosemide (LASIX) 40 MG tablet TAKE 1 TABLET DAILY    gabapentin (NEURONTIN) 300 MG capsule TAKE 1 CAPSULE BY MOUTH AT BEDTIME FOR DIABETIC NEUROPATHY    insulin glargine (LANTUS) 100 unit/mL injection Inject 50 Units into the skin once daily. 50 units Subcutaneous Every morning.  Pt needs 90 days of lantus-takes 50 units QHS    ipratropium (ATROVENT) 0.06 % nasal spray 2 sprays by Nasal route 2 (two) times daily.    lancets (ACCU-CHEK SOFTCLIX LANCETS) Misc 1 strip by Misc.(Non-Drug; Combo Route) route 2 (two) times daily.    lansoprazole (PREVACID SOLUTAB) 30 MG disintegrating tablet Take 1 tablet (30 mg total) by mouth once daily. 1 Tablet,Rapid Dissolve, DR Sublingual Every day.  for acid reflux    losartan-hydrochlorothiazide 100-12.5 mg (HYZAAR) 100-12.5 mg Tab TAKE 1 TABLET ONCE DAILY FOR BLOOD PRESSURE IN PLACE OF LOSARTAN    ondansetron (ZOFRAN) 8 MG tablet Take 1 tablet (8 mg total) by mouth every 8 (eight) hours as needed for Nausea. 1 tab Q8 hours as needed for nausea    oxycodone (OXYCONTIN) 20 mg 12 hr tablet 1 tab Q12 hours as needed for Lumbar/Back pain    oxycodone (OXYCONTIN) 20 mg 12 hr tablet 1 tab Q12-24 hours as needed for pain    peg-electrolyte soln (TRILYTE WITH FLAVOR PACKETS) 420 gram SolR Take 1 kit by mouth as directed.    PREVACID SOLUTAB 30 mg disintegrating tablet PLACE 1 TABLET (30 MG TOTAL) ON TONGUE ONCE DAILY FOR ACID REFLUX    rivaroxaban (XARELTO) 15 mg Tab Take 1 tablet (15 mg total) by mouth once daily.    SITagliptan (JANUVIA) 50 MG Tab Take 1 tablet (50 mg total) by mouth once daily. For Diabetes    tamsulosin (FLOMAX) 0.4 mg Cp24 Take 1 capsule (0.4 mg total) by mouth once daily. 1 Capsule, Sust. Release 24 hr Oral Every day.  for prostate    timolol maleate 0.5% (TIMOPTIC) 0.5 % Drop     TRAVATAN Z 0.004 % Drop      Family  "History     None        Social History Main Topics    Smoking status: Former Smoker     Packs/day: 4.00     Years: 30.00     Quit date: 1/9/1990    Smokeless tobacco: Not on file      Comment: pt was up to 4 packs a day before.    Alcohol use No    Drug use: No    Sexual activity: Yes     Partners: Female     Review of Systems   Constitutional: Negative for fever.   HENT: Negative.  Negative for congestion and sore throat.    Respiratory: Negative for cough, chest tightness and shortness of breath.    Cardiovascular: Negative for chest pain and leg swelling.   Gastrointestinal: Negative for abdominal pain and nausea.   Musculoskeletal: Negative.    Neurological: Negative for weakness and numbness.   Psychiatric/Behavioral: Negative for confusion.       Objective:     Vital Signs (Most Recent):  Temp: 98.3 °F (36.8 °C) (06/18/17 2226)  Pulse: 61 (06/18/17 2226)  Resp: 18 (06/18/17 2226)  BP: (!) 104/56 (06/18/17 2342)  SpO2: 99 % (06/18/17 2226) Vital Signs (24h Range):  Temp:  [98.3 °F (36.8 °C)-99.3 °F (37.4 °C)] 98.3 °F (36.8 °C)  Pulse:  [61-78] 61  Resp:  [16-20] 18  SpO2:  [93 %-99 %] 99 %  BP: ()/(44-84) 104/56     Weight: 112.3 kg (247 lb 9.2 oz)  Body mass index is 34.53 kg/m².    Physical Exam  Nursing note and vitals reviewed.  Constitutional: He appears well-developed and well-nourished.  HENT:   Head: Normocephalic and atraumatic.   Right Ear: External ear normal.   Left Ear: External ear normal.   Eyes: Pupils are equal, round, and reactive to light.   Neck: Neck supple.   Cardiovascular: Normal heart sounds.   Abdominal: Soft. He exhibits distension. There is no tenderness. There is no rebound.   Musculoskeletal: Has leg swelling diffusely, left more than right. Per ED note "When I ask does he always have one leg more swollen than another, he says "yes! My left one"  His son corroborates this"  Diminished DP pulses bilaterally with edema  Neurological:  No cranial nerve deficit. AOX3   Skin: " Skin is warm.       Significant Labs:   CBC:   Recent Labs  Lab 06/18/17  1658 06/18/17  2247   WBC 14.23* 8.87   HGB 9.8* 9.1*   HCT 31.6* 29.1*    169     CMP:   Recent Labs  Lab 06/18/17  1658 06/18/17  2247   * 139   K 5.3* 5.0   * 114*   CO2 20* 17*   * 136*   BUN 68* 69*   CREATININE 2.70* 2.7*   CALCIUM 8.3* 8.2*   PROT 6.6  --    ALBUMIN 3.4*  --    BILITOT 0.9  --    ALKPHOS 73  --    AST 20  --    ALT 38  --    ANIONGAP 13 8   EGFRNONAA 21.3* 21.3*     Lactic Acid:   Recent Labs  Lab 06/18/17  1736 06/18/17  2246   LACTATE 1.0 0.9     All pertinent labs within the past 24 hours have been reviewed.    Significant Imaging: I have reviewed all pertinent imaging results/findings within the past 24 hours.    Assessment/Plan:     * Acute hypoxemic respiratory failure    - Possible cause CAP vs CHF excerebation for this respiratory failure vs PE/DVT  - CXR: Patchy alveolar opacities in the bilateral lower lobes and to a lesser degree the bilateral upper lobes, right greater the left  - Sepsis treated with IVF and IV abx, lactic acid wnl at this time  - will purse CT chest to better evaluate lungs  - Cxr and patient presentation is not typical of PE. Plus patient is currently on long-term anticoagulation at this time  - continue treatment of CAP with Rocephin/Azithromycin  - will consider ABG to r/o hypercapnic respiratory failure as well  - BNP elevation, will stop IV fluids for now, will get 2D ECHO as last one was 2014  - will consider IV lasix            Pneumonia, community acquired    - CAP, treat with Abx: Rocephin and Azithromycin for now          Encephalopathy    - possibly due to infectious cause vs metabolic cause  - CT head did not show acute findings  - at this time improving, he is AOX3, he is hard of hearing but able to answer all questions appropriately           JOHN PAUL (acute kidney injury)    - possible JOHN PAUL on CKD stage 3  - hyperkalemia improved with hydration  - patient  with metabolic acidosis from chronic renal disease  - patient with BUN/cr 68/2.7 that did not improved with IV hydration  - at this time suspect this is more chronic  - hold ARB/HTCZ/ lasix medications  - check FeNa/FeUrea  - will hold off IVF for now  - monitor I/Os        Atrial fibrillation    - Afib noted on EKG, rate controlled  - patient report not taking this medication  - will continue low dose CR Coreg but can change to metoprolol if BP continue to be low  - CHADVas score of 5   - will continue Xarelto 15mg QD at this time            Diabetes mellitus with renal complications    - initially hyperglycemia ~220 in ED  - stable at this time  - decrease his long acting insulin to 30 U  - checking A1C, SSI for now, with room to increase          CARMELO (obstructive sleep apnea)    - CPAP QHS at 15cm          Hyperlipidemia    - continue home medications          HTN (hypertension)    - BP is low-normal tensive at this time  - will hold ARB/HCTZ  - will continue coreg but can change to metoprolol to help with afib if BP continue to be low  - can consider stopping flomax (alpha blocker)            VTE Risk Mitigation         Ordered     rivaroxaban tablet 15 mg  With dinner     Route:  Oral        06/19/17 0112        Juni Friedman MD  Department of Hospital Medicine   [facility]

## 2017-06-19 NOTE — PT/OT/SLP EVAL
Occupational Therapy  Evaluation    Joseph Valerio   MRN: [id number]   Admitting Diagnosis: Acute hypoxemic respiratory failure    OT Date of Treatment: 06/19/17   OT Start Time: 0900  OT Stop Time: 0927  OT Total Time (min): 27 min    Billable Minutes:  Evaluation 15  Self Care/Home Management 12    Diagnosis: Acute hypoxemic respiratory failure   Pneumonia    Past Medical History:   Diagnosis Date    *Atrial fibrillation     Allergy     Aortic sclerosis     BPH (benign prostatic hypertrophy)     Calcium nephrolithiasis     CKD (chronic kidney disease), stage III 10/16/2014    Colon polyps     Coronary artery disease     CPAP (continuous positive airway pressure) dependence     Degenerative disc disease     Diabetes mellitus     Hiatal hernia     Hyperlipidemia     Hyperprolactinemia 12/29/2014    Hypertension     Peripheral vascular disease     Proteinuria     Renal insufficiency     Renovascular hypertension 7/31/2012    Sleep apnea     Venous insufficiency       Past Surgical History:   Procedure Laterality Date    CATARACT EXTRACTION, BILATERAL      CHOLECYSTECTOMY      GASTRIC BYPASS         Referring physician: Idalia  Date referred to OT: 6/19/17    General Precautions: Standard, fall  Orthopedic Precautions:    Braces:            Patient History:  Living Environment  Lives With: alone  Living Arrangements: house  Living Environment Comment: Pt lives alone in a [facility] with a tub/shower with grab bars. PTA, he was (I) with ADLs and only used a RW PRN. Reports that he had a couple falls in the last month. Son lives nearby and can assist sometimes.  Equipment Currently Used at Home: walker, rolling, cane, straight    Prior level of function:   Bed Mobility/Transfers: needs device  Grooming: independent  Bathing: independent  Upper Body Dressing: independent  Lower Body Dressing: independent  Toileting: independent  Home Management Skills: independent        Dominant hand:  right    Subjective:  Communicated with RN prior to session.  Chief Complaint: Recent falls  Patient/Family stated goals: Return to PLOF.         Objective:  Patient found with: peripheral IV    Cognitive Exam:  Oriented to: Person, Place, Time and Situation  Follows Commands/attention: Follows multistep  commands  Communication: clear/fluent  Memory:  No Deficits noted  Safety awareness/insight to disability: intact  Coping skills/emotional control: Appropriate to situation    Visual/perceptual:  Intact    Physical Exam:  Postural examination/scapula alignment: No postural abnormalities identified  Skin integrity: Visible skin intact  Edema: Mild BLEs    Sensation:   Intact    Upper Extremity Range of Motion:  Right Upper Extremity: WNL  Left Upper Extremity: WNL    Upper Extremity Strength:  Right Upper Extremity: WNL  Left Upper Extremity: WNL   Strength: wnl    Fine motor coordination:   Intact    Gross motor coordination: WFL    Functional Mobility:  Bed Mobility:  Rolling/Turning to Left: Stand by assistance  Scooting/Bridging: Stand by Assistance  Supine to Sit: Stand by Assistance  Sit to Supine:  (Left up in chair.)    Transfers:  Sit <> Stand Assistance: Stand By Assistance  Sit <> Stand Assistive Device: No Assistive Device  Toilet Transfer Technique: Stand Pivot  Toilet Transfer Assistance: Contact Guard Assistance  Toilet Transfer Assistive Device: grab bar    Functional Ambulation: Ambulated CGA in the hallway with no AD but had a tendency to use wall/railings for stability. See PT not for distance.    Activities of Daily Living:     UE Dressing Level of Assistance: Set-up Assistance  LE Dressing Level of Assistance: Contact guard (For LE help.)        Toileting Where Assessed: Toilet  Toileting Level of Assistance: Stand by assistance        Balance:   Static Sit: GOOD+: Takes MAXIMAL challenges from all directions.    Dynamic Sit: GOOD: Maintains balance through MODERATE excursions of active  "trunk movement  Static Stand: GOOD-: Takes MODERATE challenges from all directions inconsistently  Dynamic stand: FAIR: Needs CONTACT GUARD during gait    Therapeutic Activities and Exercises:  UE ROM/MMT  Functional mobility assessment  Sit/standing balance assessment  OT POC    AM-PAC 6 CLICK ADL  How much help from another person does this patient currently need?  1 = Unable, Total/Dependent Assistance  2 = A lot, Maximum/Moderate Assistance  3 = A little, Minimum/Contact Guard/Supervision  4 = None, Modified [place]/Independent    Putting on and taking off regular lower body clothing? : 3  Bathing (including washing, rinsing, drying)?: 3  Toileting, which includes using toilet, bedpan, or urinal? : 3  Putting on and taking off regular upper body clothing?: 3  Taking care of personal grooming such as brushing teeth?: 4  Eating meals?: 4  Total Score: 20    AM-PAC Raw Score CMS "G-Code Modifier Level of Impairment Assistance   6 % Total / Unable   7 - 9 CM 80 - 100% Maximal Assist   10-14 CL 60 - 80% Moderate Assist   15 - 19 CK 40 - 60% Moderate Assist   20 - 22 CJ 20 - 40% Minimal Assist   23 CI 1-20% SBA / CGA   24 CH 0% Independent/ Mod I       Patient left EOB with call button in reach and breakfast tray set up    Assessment:  Joseph Valerio is a 81 y.o. male with a medical diagnosis of Acute hypoxemic respiratory failure and presents with decreased (I) in multiple ADL areas, functional mobility & t/fs as well as decreased overall strength, ROM endurance and balance. Pt would benefit from IP OT to address these deficits and to facilitate improving (I) with daily tasks.    Rehab identified problem list/impairments: Rehab identified problem list/impairments: weakness, impaired endurance, impaired self care skills, gait instability, decreased safety awareness, impaired balance, impaired functional mobilty    Rehab potential is good.    Activity tolerance: Good    Discharge recommendations: " Discharge Facility/Level Of Care Needs: home health OT     Barriers to discharge: Barriers to Discharge: Decreased caregiver support    Equipment recommendations: bath bench     GOALS:    Occupational Therapy Goals        Problem: Occupational Therapy Goal    Goal Priority Disciplines Outcome Interventions   Occupational Therapy Goal     OT, PT/OT Ongoing (interventions implemented as appropriate)    Description:  Goals to be met by: 6/26/17    Patient will increase functional independence with ADLs by performing:    LE Dressing with Set-up Assistance.  Grooming while standing at sink with Modified [place].  Toileting from toilet with Modified [place] for hygiene and clothing management.   Supine to sit with [place].  Stand pivot transfers with Modified [place].  Toilet transfer to toilet with Modified [place].                      PLAN:  Patient to be seen 3 x/week to address the above listed problems via self-care/home management, therapeutic exercises, therapeutic activities  Plan of Care expires: 07/19/17  Plan of Care reviewed with: patient         MILLER Krueger  06/19/2017

## 2017-06-19 NOTE — ASSESSMENT & PLAN NOTE
- Afib noted on EKG, rate controlled  - patient report not taking this medication  - will continue low dose CR Coreg but can change to metoprolol if BP continue to be low  - CHADVas score of 5   - will continue Xarelto 15mg QD at this time

## 2017-06-19 NOTE — ED NOTES
Pt accepted to [facility] to Dr Loaiza to room 1158B-call report to [phone]-spoke to Lucia Park at [facility].

## 2017-06-19 NOTE — PLAN OF CARE
Problem: Physical Therapy Goal  Goal: Physical Therapy Goal  Goals to be met by: 17     Patient will increase functional independence with mobility by performin. Supine to sit with Modified Hand  2. Sit to supine with Modified Hand  3. Sit to stand transfer with Supervision  4. Bed to chair transfer with Supervision using LRAD  5. Gait  x 200 feet with Supervision using LRAD  6. Lower extremity exercise program x 15 reps per handout, with independence    Outcome: Ongoing (interventions implemented as appropriate)  PT evaluation complete and goals established.     Claudia Paul DPT, PT  2017

## 2017-06-19 NOTE — ASSESSMENT & PLAN NOTE
- possibly due to infectious cause vs metabolic cause  - CT head did not show acute findings  - at this time improving, he is AOX3, he is hard of hearing but able to answer all questions appropriately

## 2017-06-19 NOTE — HPI
"Joseph Valerio is a 81 y.o. male with medical history of Afib, CKD stage 3, DM2, HTN, Sleep apnea, HLD who was brought in by his son with concern for confusion/AMS and "not acting right since this morning" at a freestanding ED and patient requests transfer to [facility] after finding to have possible bilateral pneumonia. Per son he was also found to have hyperglycemia at home with congestion. IV abx started at the ED with Vanc/Cefepime/Azithromycin for initially HCAP but later treated as CAP. AMS appeared to have resolved AOX3 at this time. He was found to have possible JOHN PAUL, was give 1 liter of NS. Upon transfer patient was placed on 2L NC due to desating. Patient himself reported no cough, no congestion, no fever, no chills, no sick contact or aspiration event. But he is not aware of some of his home medications. Chart review showed that he is on blood thinners for atrial fibrillation but he denies taking Xarelto. He report living at home by himself, taking all his medications and able to do all ADLs. Son was not present upon transfer. BNP was elevated with concern for CHF.     "

## 2017-06-19 NOTE — MEDICAL/APP STUDENT
[facility] Hospital Medicine  Progress Note    Patient Name: Joseph Valerio  MRN: [id number]  Patient Class: IP- Inpatient   Admission Date: 6/18/2017  Length of Stay: 1 days  Attending Physician: Karina Bang MD  Primary Care Provider: Thompson Stiles MD    [facility] Medicine Team: [facility]OS[facility] MED 3 Carissa Christine    Subjective:     Principal Problem:Acute hypoxemic respiratory failure    HPI: Joseph Valerio is a 81 year old male who was transferred from the [facility] ED, after being brought in by his son on 6/18 for confusion and congestion since that morning. Pt has a past MHx relevant for afib, CKD stage III, HTN, sleep apnea and hyperlipidemia.     Pt states he takes meds regularly at home and checks BP and blood sugars daily. States BP is well-controlled (unsure of numbers) and that glucose is usually between 75-90 post meals. Per pt's son, pt was hyperglycemia on morning of admission.    Last echo was done in 2014. Showed preserved EF (60-65%) with L ventricular diastolic dysfunction. EKG from 6/18/2017 shows afib, which is rate controlled with carvedilol.     Pt denies cough, congestion, fever, chills, sick contacts, dizziness/syncopy, weight gain, or changes in urinary frequency.    Hospital Course:   Pt was given 1 liter NS at [facility] for possible JOHN PAUL. BUN/Creatinine did not improve upon administration of IVF. Upon transfer to [facility], pt was placed on 2L nasal cannula due to desating.   Mental state has improved since yesterday per previous notes.    Interval History: No acute events overnight. Pt was easily arousable and pleasantly cooperative. Has no current complaints.     Review of Systems   Constitutional: Negative for activity change, chills, diaphoresis, fatigue, fever and unexpected weight change.   Respiratory: Negative for cough, chest tightness, shortness of breath and wheezing.    Cardiovascular: Negative for chest pain, palpitations and leg swelling.    Genitourinary: Negative for frequency.   Neurological: Negative.      Objective:     Vital Signs (Most Recent):  Temp: 98.2 °F (36.8 °C) (06/19/17 0445)  Pulse: 61 (06/19/17 0445)  Resp: 14 (06/19/17 0445)  BP: 123/63 (06/19/17 0445)  SpO2: 100 % (06/19/17 0445) Vital Signs (24h Range):  Temp:  [98.2 °F (36.8 °C)-99.3 °F (37.4 °C)] 98.2 °F (36.8 °C)  Pulse:  [57-78] 61  Resp:  [14-20] 14  SpO2:  [93 %-100 %] 100 %  BP: ()/(44-84) 123/63     Weight: 112.3 kg (247 lb 9.2 oz)  Body mass index is 34.53 kg/m².  No intake or output data in the 24 hours ending 06/19/17 0755     Physical Exam   Constitutional: He is oriented to person, place, and time. He appears well-developed and well-nourished. No distress.   HENT:   Head: Normocephalic and atraumatic.   Eyes: EOM are normal. Pupils are equal, round, and reactive to light.   Cardiovascular: Normal rate.  Exam reveals decreased pulses.    Pulses:       Dorsalis pedis pulses are 1+ on the right side, and 1+ on the left side.        Posterior tibial pulses are 1+ on the right side, and 1+ on the left side.   Heart sounds distant due to excess adiposity. S1, S2 present. Unidentified added sound.    Pulmonary/Chest: Effort normal. No accessory muscle usage. No respiratory distress. He has rhonchi in the right lower field, the left middle field and the left lower field. He has no rales.   Neurological: He is alert and oriented to person, place, and time.   Skin: Skin is warm and dry. Capillary refill takes 2 to 3 seconds. Rash noted. He is not diaphoretic.   Mild swelling in lower extremities. Trophic changes in lower extremities. Trophic changes.       Significant Labs:     Recent Results (from the past 12 hour(s))   Comprehensive metabolic panel    Collection Time: 06/19/17  3:44 AM   Result Value Ref Range    Sodium 141 136 - 145 mmol/L    Potassium 5.0 3.5 - 5.1 mmol/L    Chloride 115 (H) 95 - 110 mmol/L    CO2 14 (L) 23 - 29 mmol/L    Glucose 149 (H) 70 - 110  mg/dL    BUN, Bld 72 (H) 8 - 23 mg/dL    Creatinine 2.9 (H) 0.5 - 1.4 mg/dL    Calcium 8.6 (L) 8.7 - 10.5 mg/dL    Total Protein 6.2 6.0 - 8.4 g/dL    Albumin 2.8 (L) 3.5 - 5.2 g/dL    Total Bilirubin 0.8 0.1 - 1.0 mg/dL    Alkaline Phosphatase 79 55 - 135 U/L    AST 17 10 - 40 U/L    ALT 23 10 - 44 U/L    Anion Gap 12 8 - 16 mmol/L    eGFR if African American 22.6 (A) >60 mL/min/1.73 m^2    eGFR if non African American 19.5 (A) >60 mL/min/1.73 m^2   CBC auto differential    Collection Time: 06/19/17  3:44 AM   Result Value Ref Range    WBC 8.56 3.90 - 12.70 K/uL    RBC 3.28 (L) 4.60 - 6.20 M/uL    Hemoglobin 9.2 (L) 14.0 - 18.0 g/dL    Hematocrit 30.0 (L) 40.0 - 54.0 %    MCV 92 82 - 98 fL    MCH 28.0 27.0 - 31.0 pg    MCHC 30.7 (L) 32.0 - 36.0 %    RDW 13.4 11.5 - 14.5 %    Platelets 172 150 - 350 K/uL    MPV 11.5 9.2 - 12.9 fL    Gran # 5.8 1.8 - 7.7 K/uL    Lymph # 1.8 1.0 - 4.8 K/uL    Mono # 0.9 0.3 - 1.0 K/uL    Eos # 0.1 0.0 - 0.5 K/uL    Baso # 0.02 0.00 - 0.20 K/uL    Gran% 67.3 38.0 - 73.0 %    Lymph% 21.0 18.0 - 48.0 %    Mono% 10.5 4.0 - 15.0 %    Eosinophil% 0.6 0.0 - 8.0 %    Basophil% 0.2 0.0 - 1.9 %    Differential Method Automated    Magnesium    Collection Time: 06/19/17  3:44 AM   Result Value Ref Range    Magnesium 2.5 1.6 - 2.6 mg/dL   Phosphorus    Collection Time: 06/19/17  3:44 AM   Result Value Ref Range    Phosphorus 4.3 2.7 - 4.5 mg/dL   C-reactive protein    Collection Time: 06/19/17  3:44 AM   Result Value Ref Range    .9 (H) 0.0 - 8.2 mg/L   POCT glucose    Collection Time: 06/19/17  8:17 AM   Result Value Ref Range    POCT Glucose 71 70 - 110 mg/dL   Sedimentation rate, manual    Collection Time: 06/19/17 10:41 AM   Result Value Ref Range    Sed Rate 40 (H) 0 - 10 mm/Hr   Procalcitonin    Collection Time: 06/19/17 11:29 AM   Result Value Ref Range    Procalcitonin 1.56 (H) <0.25 ng/mL       Significant Imaging:     CT chest without contrast (6/18): pleural plaques, patchy  opacities in lung with focal consolidation in L lower lobe    CT head without contrast (6/18): no acute changes    CXR (6/18): findings consistent with CT chest     Echo (2014):  EF 60-65%  L ventricular diastolic dysfunction  Bilateral ventricular enlargement      Assessment/Plan:      Active Diagnoses:    Diagnosis Date Noted POA    PRINCIPAL PROBLEM:  Acute hypoxemic respiratory failure [J96.01] 06/19/2017 Yes    JOHN PAUL (acute kidney injury) [N17.9] 06/19/2017 Unknown    Encephalopathy [G93.40] 06/19/2017 Yes    Pneumonia, community acquired [J18.9] 06/18/2017 Yes    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] 11/18/2014 Yes    Atrial fibrillation [I48.91] 10/16/2014 Yes    CKD (chronic kidney disease), stage III [N18.3] 10/16/2014 Yes    CARMELO (obstructive sleep apnea) [G47.33] 07/31/2012 Yes    HTN (hypertension) [I10] 07/31/2012 Yes    CAD (coronary artery disease) [I25.10] 07/31/2012 Yes    Hyperlipidemia [E78.5] 07/31/2012 Yes      Problems Resolved During this Admission:    Diagnosis Date Noted Date Resolved POA     VTE Risk Mitigation         Ordered     rivaroxaban tablet 15 mg  With dinner     Route:  Oral        06/19/17 0112        Joseph Harden is a 81 year old male, with exam and clinical findings consistent with a fluid overloaded state.     1. Acute repiratory hypoxemic respiratory failure  Patient is not symptomatic at this time.   - per previous note pt not consistent with rivoraxaban at home,but pt told me takes all meds  - repeat echo ordered (last one done in 2014)  - hold IVF at this time  - start IV lasix 80mg for CHF/JOHN PAUL  - ABGs  - strict i/o's, daily weights    2. Possible CAP  Pt O2 sats have improved. Not acutely symptomatic.  - day 1 of 4 of ceftriaxone and azithromycin    3. Encephalopathy  Patient alert and oriented x 3 (improved from yesterday); CT head shows no acute changes  - secondary possibly to CAP, decreased O2 saturation    4. CKD Stage III with acute rise in  BUN/creatinine from baseline  No improvement post-IVF, rise in BUN/creatinine less likely to be due to JOHN PAUL  - hold IVF  - hold ARB, HCTZ  - FeNa, Fe urea, Wrights urine ordered   - consider IV lasix 80mg     5. Afib  SHERRILL-VASC 6 HAS-BLED 3  - continue rivoraxaban 15mg QD  - EKG (6/18/17): shows rate-controlled afib  - 6.25 mg carvedilol currently; consider switching to metoprolol if BP decreases further    6. DM  blood glucose of 220 in ED, since then has been WNL  - long-actinging insulin was decreased to 30 IU    7. CARMELO  Has been using CPAP consistently at home.   - continues use nightly in hospital.  - continue CPAP QHS at 15 cm    8. HTN  BP ran low last night. Pt says BP well-controlled at home.  - hold ARB/HCTZ  - if decreases further, stop flomax    Goals for discharge: O2 sats return to normal, improvement in BUN/creatinine if possible, reassess meds post echo    Disposition: Discharge home.    Alla Christine, MS3  Department of Hospital Medicine   [facility]

## 2017-06-19 NOTE — PLAN OF CARE
Problem: Fall Risk (Adult)  Intervention: Safety Promotion/Fall Prevention  Patient remained free of falls today. Patient knows to call for help if he needs assistance. Frequent rounds on patient. All patient needs met. Bed in lowest position. Safety maintained. Call bell in reach. No distress noted. Will continue to monitor.

## 2017-06-19 NOTE — ASSESSMENT & PLAN NOTE
- Possible cause CAP vs CHF excerebation for this respiratory failure vs PE/DVT  - CXR: Patchy alveolar opacities in the bilateral lower lobes and to a lesser degree the bilateral upper lobes, right greater the left  - Sepsis treated with IVF and IV abx, lactic acid wnl at this time  - will purse CT chest to better evaluate lungs  - Cxr and patient presentation is not typical of PE  - continue treatment of CAP with Rocephin/Azithromycin  - will consider ABG to r/o hypercapnic respiratory failure as well  - BNP elevation, will stop IV fluids for now, will get 2D ECHO as last one was 2014  - will consider IV lasix

## 2017-06-19 NOTE — ASSESSMENT & PLAN NOTE
- Possible cause CAP vs CHF excerebation for this respiratory failure vs PE/DVT  - CXR: Patchy alveolar opacities in the bilateral lower lobes and to a lesser degree the bilateral upper lobes, right greater the left  - Sepsis treated with IVF and IV abx, lactic acid wnl at this time  - will purse CT chest to better evaluate lungs  - Cxr and patient presentation is not typical of PE. Plus patient is currently on long-term anticoagulation at this time  - continue treatment of CAP with Rocephin/Azithromycin  - will consider ABG to r/o hypercapnic respiratory failure as well  - BNP elevation, will stop IV fluids for now, will get 2D ECHO as last one was 2014  - will consider IV lasix

## 2017-06-19 NOTE — PHYSICIAN QUERY
PT Name: Joseph Valerio  MR #: [id number]     Physician Query Form - Diagnosis Clarification      CDS/: Carlos Gomez               Contact information: [id number]    This form is a permanent document in the medical record.     Query Date: June 19, 2017    By submitting this query, we are merely seeking further clarification of documentation.  Please utilize your independent clinical judgment when addressing the question(s) below.     The medical record contains the following:      Findings Supporting Clinical Information Location in Medical Record   Sepsis treated with IVF and IV abx, lactic acid wnl at this time     WBC 14.23....>8.56    B/ P  H/P 6/19        Labs 6/18..>6/19    Flow sheet 6/18...>6/19     Please clarify if the ____Sepsis_______________________ diagnosis has been:    [  ] Ruled In  [x  ] Ruled In, Now Resolved  [  ] Resolved Prior to My Assessment  [  ] Ruled Out  [  ] Clinically insignificant  [  ] Clinically undetermined  [  ] Other/Clarification of findings (please specify)_______________________________    Please document in your progress notes daily for the duration of treatment, until resolved, and include in your discharge summary.

## 2017-06-19 NOTE — PHYSICIAN QUERY
PT Name: Joseph Valerio  MR #: [id number]    Physician Query Form - Atrial Fibrillation Specificity     CDS/: Carlos Gomez               Contact information: [id number]     This form is a permanent document in the medical record.     Query Date: June 19, 2017    By submitting this query, we are merely seeking further clarification of documentation. Please utilize your independent clinical judgment when addressing the question(s) below.    The medical record contains the following:   Indicators     Supporting Clinical Findings Location in Medical Record   x Atrial Fibrillation Atrial fibrillation - Afib noted on EKG, rate controlled   - patient report not taking this medication    H/P 6/19    EKG results     x Medication will continue low dose CR Coreg but can change to metoprolol if BP continue to be low   - CHADVas score of 5   - will continue Xarelto 15mg QD at this time  H/P 6/19    Treatment      Other         Provider, please further specify the Atrial Fibrillation diagnosis.    [ x ] Chronic  [  ] Paroxysmal  [  ] Permanent  [  ] Persistent  [  ] Other (please specify): ____________________________  [  ] Clinically Undetermined    Please document in your progress notes daily for the duration of treatment until resolved, and include in your discharge summary.

## 2017-06-19 NOTE — SUBJECTIVE & OBJECTIVE
Past Medical History:   Diagnosis Date    *Atrial fibrillation     Allergy     Aortic sclerosis     BPH (benign prostatic hypertrophy)     Calcium nephrolithiasis     CKD (chronic kidney disease), stage III 10/16/2014    Colon polyps     Coronary artery disease     CPAP (continuous positive airway pressure) dependence     Degenerative disc disease     Diabetes mellitus     Hiatal hernia     Hyperlipidemia     Hyperprolactinemia 12/29/2014    Hypertension     Peripheral vascular disease     Proteinuria     Renal insufficiency     Sleep apnea     Venous insufficiency        Past Surgical History:   Procedure Laterality Date    CATARACT EXTRACTION, BILATERAL      CHOLECYSTECTOMY      GASTRIC BYPASS         Review of patient's allergies indicates:  No Known Allergies    Current Facility-Administered Medications on File Prior to Encounter   Medication    [COMPLETED] 0.9%  NaCl infusion    [COMPLETED] 0.9%  NaCl infusion    [COMPLETED] azithromycin 500 mg in dextrose 5 % 250 mL IVPB (ready to mix system)    [COMPLETED] cefepime in dextrose 5 % 1 gram/50 mL IVPB 1 g    [DISCONTINUED] moxifloxacin 400 mg/250 mL IVPB 400 mg    [DISCONTINUED] vancomycin (VANCOCIN) 1,500 mg in dextrose 5 % 250 mL IVPB     Current Outpatient Prescriptions on File Prior to Encounter   Medication Sig    acetaminophen (TYLENOL ARTHRITIS PAIN) 650 MG TbSR Take 1 tablet (650 mg total) by mouth every 8 (eight) hours. 1 tab 2-3x/day for arthritis pain    azelastine-fluticasone 137-50 mcg/spray Spry nassal spray 1 spray by Each Nare route 2 (two) times daily.    blood sugar diagnostic Strp Check glucose 2-3x/day before meals and/or bedtime    CARVEDILOL PHOSPHATE 20 MG ORAL CM24 (COREG CR) 20 mg 24 hr capsule Take 1 capsule (20 mg total) by mouth once daily. 1 Cap, Multiphasic Release 24 hr Oral Every day    CARVEDILOL PHOSPHATE 20 MG ORAL CM24 (COREG CR) 20 mg 24 hr capsule Take 1 capsule (20 mg total) by mouth once  daily.    cholestyramine (QUESTRAN) 4 gram packet 1 packet up to daily for Bowel Movements/Diarrhea prevention    COREG CR 20 mg 24 hr capsule TAKE 1 CAPSULE DAILY    cyanocobalamin, vitamin B-12, 1,000 mcg TbSR     ezetimibe-simvastatin 10-20 mg (VYTORIN 10-20) 10-20 mg per tablet Take 1 tablet by mouth every evening. 1 Tablet Oral At bedtime.  for cholesterol    ferrous sulfate dried (SLOW FE) 160 mg (50 mg iron) TbSR Take 1 tablet (160 mg total) by mouth once daily.    furosemide (LASIX) 40 MG tablet TAKE 1 TABLET DAILY    gabapentin (NEURONTIN) 300 MG capsule TAKE 1 CAPSULE BY MOUTH AT BEDTIME FOR DIABETIC NEUROPATHY    insulin glargine (LANTUS) 100 unit/mL injection Inject 50 Units into the skin once daily. 50 units Subcutaneous Every morning.  Pt needs 90 days of lantus-takes 50 units QHS    ipratropium (ATROVENT) 0.06 % nasal spray 2 sprays by Nasal route 2 (two) times daily.    lancets (ACCU-CHEK SOFTCLIX LANCETS) Misc 1 strip by Misc.(Non-Drug; Combo Route) route 2 (two) times daily.    lansoprazole (PREVACID SOLUTAB) 30 MG disintegrating tablet Take 1 tablet (30 mg total) by mouth once daily. 1 Tablet,Rapid Dissolve, DR Sublingual Every day.  for acid reflux    losartan-hydrochlorothiazide 100-12.5 mg (HYZAAR) 100-12.5 mg Tab TAKE 1 TABLET ONCE DAILY FOR BLOOD PRESSURE IN PLACE OF LOSARTAN    ondansetron (ZOFRAN) 8 MG tablet Take 1 tablet (8 mg total) by mouth every 8 (eight) hours as needed for Nausea. 1 tab Q8 hours as needed for nausea    oxycodone (OXYCONTIN) 20 mg 12 hr tablet 1 tab Q12 hours as needed for Lumbar/Back pain    oxycodone (OXYCONTIN) 20 mg 12 hr tablet 1 tab Q12-24 hours as needed for pain    peg-electrolyte soln (TRILYTE WITH FLAVOR PACKETS) 420 gram SolR Take 1 kit by mouth as directed.    PREVACID SOLUTAB 30 mg disintegrating tablet PLACE 1 TABLET (30 MG TOTAL) ON TONGUE ONCE DAILY FOR ACID REFLUX    rivaroxaban (XARELTO) 15 mg Tab Take 1 tablet (15 mg total) by  mouth once daily.    SITagliptan (JANUVIA) 50 MG Tab Take 1 tablet (50 mg total) by mouth once daily. For Diabetes    tamsulosin (FLOMAX) 0.4 mg Cp24 Take 1 capsule (0.4 mg total) by mouth once daily. 1 Capsule, Sust. Release 24 hr Oral Every day.  for prostate    timolol maleate 0.5% (TIMOPTIC) 0.5 % Drop     TRAVATAN Z 0.004 % Drop      Family History     None        Social History Main Topics    Smoking status: Former Smoker     Packs/day: 4.00     Years: 30.00     Quit date: 1/9/1990    Smokeless tobacco: Not on file      Comment: pt was up to 4 packs a day before.    Alcohol use No    Drug use: No    Sexual activity: Yes     Partners: Female     Review of Systems   Constitutional: Negative for fever.   HENT: Negative.  Negative for congestion and sore throat.    Respiratory: Negative for cough, chest tightness and shortness of breath.    Cardiovascular: Negative for chest pain and leg swelling.   Gastrointestinal: Negative for abdominal pain and nausea.   Musculoskeletal: Negative.    Neurological: Negative for weakness and numbness.   Psychiatric/Behavioral: Negative for confusion.       Objective:     Vital Signs (Most Recent):  Temp: 98.3 °F (36.8 °C) (06/18/17 2226)  Pulse: 61 (06/18/17 2226)  Resp: 18 (06/18/17 2226)  BP: (!) 104/56 (06/18/17 2342)  SpO2: 99 % (06/18/17 2226) Vital Signs (24h Range):  Temp:  [98.3 °F (36.8 °C)-99.3 °F (37.4 °C)] 98.3 °F (36.8 °C)  Pulse:  [61-78] 61  Resp:  [16-20] 18  SpO2:  [93 %-99 %] 99 %  BP: ()/(44-84) 104/56     Weight: 112.3 kg (247 lb 9.2 oz)  Body mass index is 34.53 kg/m².    Physical Exam  Nursing note and vitals reviewed.  Constitutional: He appears well-developed and well-nourished.  HENT:   Head: Normocephalic and atraumatic.   Right Ear: External ear normal.   Left Ear: External ear normal.   Eyes: Pupils are equal, round, and reactive to light.   Neck: Neck supple.   Cardiovascular: Normal heart sounds.   Abdominal: Soft. He exhibits  "distension. There is no tenderness. There is no rebound.   Musculoskeletal: Has leg swelling diffusely, left more than right. Per ED note "When I ask does he always have one leg more swollen than another, he says "yes! My left one"  His son corroborates this"  Diminished DP pulses bilaterally with edema  Neurological:  No cranial nerve deficit. AOX3   Skin: Skin is warm.       Significant Labs:   CBC:   Recent Labs  Lab 06/18/17  1658 06/18/17  2247   WBC 14.23* 8.87   HGB 9.8* 9.1*   HCT 31.6* 29.1*    169     CMP:   Recent Labs  Lab 06/18/17  1658 06/18/17  2247   * 139   K 5.3* 5.0   * 114*   CO2 20* 17*   * 136*   BUN 68* 69*   CREATININE 2.70* 2.7*   CALCIUM 8.3* 8.2*   PROT 6.6  --    ALBUMIN 3.4*  --    BILITOT 0.9  --    ALKPHOS 73  --    AST 20  --    ALT 38  --    ANIONGAP 13 8   EGFRNONAA 21.3* 21.3*     Lactic Acid:   Recent Labs  Lab 06/18/17  1736 06/18/17 2246   LACTATE 1.0 0.9     All pertinent labs within the past 24 hours have been reviewed.    Significant Imaging: I have reviewed all pertinent imaging results/findings within the past 24 hours.  "

## 2017-06-19 NOTE — PT/OT/SLP EVAL
Physical Therapy  Evaluation/Treatment     Joseph Valerio   MRN: [id number]   Admitting Diagnosis: Acute hypoxemic respiratory failure    PT Received On: 06/19/17  PT Start Time: 0900     PT Stop Time: 0926    PT Total Time (min): 26 min       Billable Minutes:  Evaluation 18 minutes  and Therapeutic Activity 8 minutes     History: personal factors and/or comorbidities that impact the plan of care: CKD, CAD, DDD, DM, PVD, A-fib, respiratory failure- pneumonia   Evaluation of Body Systems: cardiovascular/pulmonary, integumentary, musculoskeletal, neuromuscular, cognition/communication  Functional Outcome Tools: AMPAC, ROM, MMT  Clinical Presentation: Stable    Evaluation Complexity Level: Low    Diagnosis: Acute hypoxemic respiratory failure    Past Medical History:   Diagnosis Date    *Atrial fibrillation     Allergy     Aortic sclerosis     BPH (benign prostatic hypertrophy)     Calcium nephrolithiasis     CKD (chronic kidney disease), stage III 10/16/2014    Colon polyps     Coronary artery disease     CPAP (continuous positive airway pressure) dependence     Degenerative disc disease     Diabetes mellitus     Hiatal hernia     Hyperlipidemia     Hyperprolactinemia 12/29/2014    Hypertension     Peripheral vascular disease     Proteinuria     Renal insufficiency     Renovascular hypertension 7/31/2012    Sleep apnea     Venous insufficiency       Past Surgical History:   Procedure Laterality Date    CATARACT EXTRACTION, BILATERAL      CHOLECYSTECTOMY      GASTRIC BYPASS         Referring physician: Juni Friedman MD  Date referred to PT: 6/18/17    General Precautions: Standard, fall  Orthopedic Precautions: N/A   Braces: N/A       Do you have any cultural, spiritual, Yazidi conflicts, given your current situation?: None stated     Patient History:  Lives With: alone  Living Arrangements: house  Home Layout: Able to live on 1st floor  Living Environment Comment: Pt reports living alone  "in SSH with 0 DENISE; bathroom has tub/shower combo with grab bars. PTA, pt was (I) with functional mobility but "occassionally" using RW or SPC. Pt was (I) with ADLs and driving. Pt reports several falls within the last month in which he wears a life alert. Pt reports son lives close but unsure assistance upon discharge he can provide.   Equipment Currently Used at Home: cane, straight, walker, rolling    Previous Level of Function:  Ambulation Skills: independent (although states "occassionally using RW or SPC" )  Transfer Skills: independent  ADL Skills: independent  Work/Leisure Activity: independent    Subjective:  Communicated with RN prior to session.  Pt agreeable to PT/OT evaluation.   Chief Complaint: none stated   Patient goals: return     Pain/Comfort  Pain Rating 1: 0/10  Pain Rating Post-Intervention 1: 0/10    Objective:   Patient found with: peripheral IV (IV not connected to monitor)     Cognitive Exam:  Oriented to: Person, Place, Time and Situation    Follows Commands/attention: Follows two-step commands  Communication: clear/fluent  Safety awareness/insight to disability: impaired    Physical Exam:  Postural examination/scapula alignment: Rounded shoulder and Head forward    Skin integrity: Thin and Dry  Edema: Moderate in BLE    Sensation:   Intact    Lower Extremity Range of Motion:  Right Lower Extremity: WFL  Left Lower Extremity: WFL    Lower Extremity Strength:  Right Lower Extremity: WFL  Left Lower Extremity: WFL     Fine motor coordination:  Intact    Gross motor coordination: WFL    Functional Mobility:  Bed Mobility:  Rolling/Turning to Left: Stand by assistance, With side rail  Scooting/Bridging: Stand by Assistance  Supine to Sit: Stand by Assistance    Transfers:  Sit <> Stand Assistance: Stand By Assistance (x 2 trials from EOB)  Sit <> Stand Assistive Device: No Assistive Device  Toilet Transfer Technique: Stand Pivot  Toilet Transfer Assistance: Stand By Assistance  Toilet Transfer " Assistive Device: No Assistive Device    Gait:   Gait Distance: 60'   Assistance 1: Contact Guard Assistance  Gait Assistive Device: No device  Gait Pattern: reciprocal  Gait Deviation(s): decreased bhavani, decreased step length, decreased stride length    Stairs: did not occur     Balance:   Static Sit: GOOD: Takes MODERATE challenges from all directions  Dynamic Sit: GOOD: Maintains balance through MODERATE excursions of active trunk movement  Static Stand: GOOD-: Takes MODERATE challenges from all directions inconsistently  Dynamic stand: FAIR: Needs CONTACT GUARD during gait    Therapeutic Activities and Exercises:  Therapeutic activities aimed to increase pt's independence, safety, and efficiency with bed mobility and functional transfers. See above for assistance levels.   · Toilet transfer: SBA using no AD- cues to use grab bar for support   · Pt educated on role of PT and POC/goals for therapy as well as safety with mobility. Pt verbalized understanding. Pt expressed no further concerns/questions.   · Verbal cues during gait for upright posture and safety. Pt tends to grab for side rail for support.     AM-[facility] 6 CLICK MOBILITY  How much help from another person does this patient currently need?   1 = Unable, Total/Dependent Assistance  2 = A lot, Maximum/Moderate Assistance  3 = A little, Minimum/Contact Guard/Supervision  4 = None, Modified [place]/Independent    Turning over in bed (including adjusting bedclothes, sheets and blankets)?: 4  Sitting down on and standing up from a chair with arms (e.g., wheelchair, bedside commode, etc.): 3  Moving from lying on back to sitting on the side of the bed?: 4  Moving to and from a bed to a chair (including a wheelchair)?: 3  Need to walk in hospital room?: 3  Climbing 3-5 steps with a railing?: 3  Total Score: 20     AM-PAC Raw Score CMS G-Code Modifier Level of Impairment Assistance   6 % Total / Unable   7 - 9 CM 80 - 100% Maximal Assist   10 - 14  CL 60 - 80% Moderate Assist   15 - 19 CK 40 - 60% Moderate Assist   20 - 22 CJ 20 - 40% Minimal Assist   23 CI 1-20% SBA / CGA   24 CH 0% Independent/ Mod I     Patient left sitting EOB  with all lines intact and call button in reach.    Assessment:   Joseph Valerio is a 81 y.o. male with a medical diagnosis of Acute hypoxemic respiratory failure. Upon initial PT evaluation, pt presents with impaired endurance, impaired functional mobilty, gait instability, impaired balance, and decreased safety awareness. Pt completed bed mobility with SBA, transfers with SBA, and ambulated 60' with CGA using no AD. Pt educated on safety with mobility throughout session.Pt would benefit from skilled PT services to address these deficits and improve return to PLOF. Anticipate d/c to home with HHPT.     Rehab identified problem list/impairments: Rehab identified problem list/impairments: impaired endurance, impaired functional mobilty, gait instability, impaired balance, decreased safety awareness    Rehab potential is good.    Activity tolerance: Good    Discharge recommendations: Discharge Facility/Level Of Care Needs: home health PT     Barriers to discharge: Barriers to Discharge: Decreased caregiver support    Equipment recommendations: Equipment Needed After Discharge: none     GOALS:    Physical Therapy Goals        Problem: Physical Therapy Goal    Goal Priority Disciplines Outcome Goal Variances Interventions   Physical Therapy Goal     PT/OT, PT Ongoing (interventions implemented as appropriate)     Description:  Goals to be met by: 17     Patient will increase functional independence with mobility by performin. Supine to sit with Modified [place]  2. Sit to supine with Modified [place]  3. Sit to stand transfer with Supervision  4. Bed to chair transfer with Supervision using LRAD  5. Gait  x 200 feet with Supervision using LRAD  6. Lower extremity exercise program x 15 reps per handout, with  independence                      PLAN:    Patient to be seen 3 x/week to address the above listed problems via gait training, therapeutic activities, therapeutic exercises  Plan of Care expires: 07/19/17  Plan of Care reviewed with: patient          Claudia Paul, PT  06/19/2017

## 2017-06-20 ENCOUNTER — TELEPHONE (OUTPATIENT)
Dept: INTERNAL MEDICINE | Facility: CLINIC | Age: 82
End: 2017-06-20

## 2017-06-20 VITALS
OXYGEN SATURATION: 98 % | WEIGHT: 247.56 LBS | DIASTOLIC BLOOD PRESSURE: 66 MMHG | HEIGHT: 71 IN | TEMPERATURE: 98 F | BODY MASS INDEX: 34.66 KG/M2 | HEART RATE: 65 BPM | RESPIRATION RATE: 16 BRPM | SYSTOLIC BLOOD PRESSURE: 157 MMHG

## 2017-06-20 DIAGNOSIS — D64.9 ANEMIA, UNSPECIFIED TYPE: ICD-10-CM

## 2017-06-20 DIAGNOSIS — J18.9 PNEUMONIA DUE TO INFECTIOUS ORGANISM, UNSPECIFIED LATERALITY, UNSPECIFIED PART OF LUNG: Primary | ICD-10-CM

## 2017-06-20 DIAGNOSIS — D51.9 ANEMIA DUE TO VITAMIN B12 DEFICIENCY, UNSPECIFIED B12 DEFICIENCY TYPE: ICD-10-CM

## 2017-06-20 LAB
ALBUMIN SERPL BCP-MCNC: 2.6 G/DL
ALP SERPL-CCNC: 74 U/L
ALT SERPL W/O P-5'-P-CCNC: 20 U/L
ANION GAP SERPL CALC-SCNC: 7 MMOL/L
AST SERPL-CCNC: 15 U/L
BACTERIA UR CULT: NO GROWTH
BASOPHILS # BLD AUTO: 0.02 K/UL
BASOPHILS NFR BLD: 0.3 %
BILIRUB SERPL-MCNC: 1 MG/DL
BUN SERPL-MCNC: 72 MG/DL
CALCIUM SERPL-MCNC: 8.3 MG/DL
CHLORIDE SERPL-SCNC: 113 MMOL/L
CO2 SERPL-SCNC: 21 MMOL/L
CREAT SERPL-MCNC: 2.3 MG/DL
DIFFERENTIAL METHOD: ABNORMAL
EOSINOPHIL # BLD AUTO: 0.1 K/UL
EOSINOPHIL NFR BLD: 1.3 %
ERYTHROCYTE [DISTWIDTH] IN BLOOD BY AUTOMATED COUNT: 13.3 %
EST. GFR  (AFRICAN AMERICAN): 29.9 ML/MIN/1.73 M^2
EST. GFR  (NON AFRICAN AMERICAN): 25.9 ML/MIN/1.73 M^2
GLUCOSE SERPL-MCNC: 110 MG/DL
HCT VFR BLD AUTO: 28.6 %
HGB BLD-MCNC: 8.7 G/DL
LYMPHOCYTES # BLD AUTO: 1.5 K/UL
LYMPHOCYTES NFR BLD: 19.3 %
MAGNESIUM SERPL-MCNC: 2.3 MG/DL
MCH RBC QN AUTO: 27.1 PG
MCHC RBC AUTO-ENTMCNC: 30.4 %
MCV RBC AUTO: 89 FL
MONOCYTES # BLD AUTO: 0.6 K/UL
MONOCYTES NFR BLD: 7.8 %
NEUTROPHILS # BLD AUTO: 5.4 K/UL
NEUTROPHILS NFR BLD: 70.9 %
PHOSPHATE SERPL-MCNC: 4.7 MG/DL
PLATELET # BLD AUTO: 169 K/UL
PMV BLD AUTO: 10.8 FL
POCT GLUCOSE: 72 MG/DL (ref 70–110)
POTASSIUM SERPL-SCNC: 5 MMOL/L
PROT SERPL-MCNC: 5.9 G/DL
RBC # BLD AUTO: 3.21 M/UL
SODIUM SERPL-SCNC: 141 MMOL/L
WBC # BLD AUTO: 7.67 K/UL

## 2017-06-20 PROCEDURE — 25000003 PHARM REV CODE 250: Performed by: HOSPITALIST

## 2017-06-20 PROCEDURE — 85025 COMPLETE CBC W/AUTO DIFF WBC: CPT

## 2017-06-20 PROCEDURE — 83735 ASSAY OF MAGNESIUM: CPT

## 2017-06-20 PROCEDURE — 99900035 HC TECH TIME PER 15 MIN (STAT)

## 2017-06-20 PROCEDURE — 36415 COLL VENOUS BLD VENIPUNCTURE: CPT

## 2017-06-20 PROCEDURE — 80053 COMPREHEN METABOLIC PANEL: CPT

## 2017-06-20 PROCEDURE — 84100 ASSAY OF PHOSPHORUS: CPT

## 2017-06-20 RX ADMIN — CYANOCOBALAMIN TAB 1000 MCG 1000 MCG: 1000 TAB at 10:06

## 2017-06-20 RX ADMIN — GUAIFENESIN 600 MG: 600 TABLET, EXTENDED RELEASE ORAL at 10:06

## 2017-06-20 RX ADMIN — TAMSULOSIN HYDROCHLORIDE 0.4 MG: 0.4 CAPSULE ORAL at 10:06

## 2017-06-20 RX ADMIN — PANTOPRAZOLE SODIUM 40 MG: 40 TABLET, DELAYED RELEASE ORAL at 10:06

## 2017-06-20 RX ADMIN — CARVEDILOL 6.25 MG: 6.25 TABLET, FILM COATED ORAL at 10:06

## 2017-06-20 RX ADMIN — ASPIRIN 81 MG: 81 TABLET, COATED ORAL at 10:06

## 2017-06-20 RX ADMIN — SODIUM CHLORIDE, PRESERVATIVE FREE 3 ML: 5 INJECTION INTRAVENOUS at 06:06

## 2017-06-20 NOTE — TELEPHONE ENCOUNTER
----- Message from Heather Boyer sent at 6/20/2017  1:15 PM CDT -----  Contact: Patient, phone [phone] or [phone]  Type: Sooner appointment than  is able to schedule    When is the first available appointment? 9/28/17    What is the nature of the appointment? ED Follow up     What appointment type: EP     Comments:  The patient says he wants to speak with Dr. Prince.  He says he needs an appointment right away.    Thanks!

## 2017-06-20 NOTE — NURSING
Patient given discharge instructions. Patient and son understand instructions, where to get prescriptions, and follow up appointment. Patient is leaving via wheelchair.Patient is waiting for transport.

## 2017-06-21 ENCOUNTER — PATIENT OUTREACH (OUTPATIENT)
Dept: ADMINISTRATIVE | Facility: CLINIC | Age: 82
End: 2017-06-21
Payer: MEDICARE

## 2017-06-21 NOTE — TELEPHONE ENCOUNTER
Tione, I can see Mr Valerio on July 5th with lab/CXR a few days prior.  If he needs to come in sooner, we'll have to schedule him in [facility].  Thanks

## 2017-06-21 NOTE — PATIENT INSTRUCTIONS

## 2017-06-21 NOTE — PT/OT/SLP DISCHARGE
Physical Therapy Discharge Summary    Joseph Valerio  MRN: [id number]   Acute hypoxemic respiratory failure   Patient Discharged from acute Physical Therapy on 17.  Please refer to prior PT noted date on 17 for functional status.     Assessment:   Patient was discharge unexpectedly.  Information required to complete and accurate discharge summary is unknown.  Refer to therapy initial evaluation and last progress note for initial and most recent functional status and goal achievement.  Recommendations made may be found in medical record. Patient appropriate for care in another setting. Patient has not met goals.  GOALS:    Physical Therapy Goals        Problem: Physical Therapy Goal    Goal Priority Disciplines Outcome Goal Variances Interventions   Physical Therapy Goal     PT/OT, PT Ongoing (interventions implemented as appropriate)     Description:  Goals to be met by: 17     Patient will increase functional independence with mobility by performin. Supine to sit with Modified [place]  2. Sit to supine with Modified [place]  3. Sit to stand transfer with Supervision  4. Bed to chair transfer with Supervision using LRAD  5. Gait  x 200 feet with Supervision using LRAD  6. Lower extremity exercise program x 15 reps per handout, with independence                    Reasons for Discontinuation of Therapy Services  Transfer to alternate level of care.      Plan:  Patient Discharged to: Home no PT services needed.    Claudia Paul DPT, PT  2017

## 2017-06-24 LAB
BACTERIA BLD CULT: NORMAL

## 2017-06-25 DIAGNOSIS — E78.2 MIXED HYPERLIPIDEMIA: ICD-10-CM

## 2017-06-26 RX ORDER — EZETIMIBE AND SIMVASTATIN 10; 20 MG/1; MG/1
TABLET ORAL
Qty: 90 TABLET | Refills: 2 | Status: SHIPPED | OUTPATIENT
Start: 2017-06-26 | End: 2017-07-05 | Stop reason: SDUPTHER

## 2017-06-28 ENCOUNTER — HOSPITAL ENCOUNTER (OUTPATIENT)
Dept: RADIOLOGY | Facility: HOSPITAL | Age: 82
Discharge: HOME | End: 2017-06-28
Attending: INTERNAL MEDICINE
Payer: MEDICARE

## 2017-06-28 DIAGNOSIS — J18.9 PNEUMONIA DUE TO INFECTIOUS ORGANISM, UNSPECIFIED LATERALITY, UNSPECIFIED PART OF LUNG: ICD-10-CM

## 2017-06-28 PROCEDURE — 71020 XR CHEST PA AND LATERAL: CPT | Mod: TC,PO

## 2017-06-28 PROCEDURE — 71020 XR CHEST PA AND LATERAL: CPT | Mod: 26,,, | Performed by: RADIOLOGY

## 2017-06-29 NOTE — PHYSICIAN QUERY
PT Name: Joseph Valerio  MR #: [id number]    Physician Query Form - Cause and Effect Relationship Clarification      CDS/: Ayah Gooden               Contact information: [email or web address]    This form is a permanent document in the medical record.     Query Date: June 29, 2017    By submitting this query, we are merely seeking further clarification of documentation. Please utilize your independent clinical judgment when addressing the question(s) below.    The Medical record contains the following:  Supporting Clinical Findings   Location in record   Sepsis treated with IVF and IV abx, lactic acid wnl at this time      Pneumonia community acquired   Cap treat with abx, rocephin and azithromycin for now         wbc 14.23                                                                                                                   Findings could be seen with underlying infectious process such as pneumonia or possible non infection inflammatory process                                                        h and p               Ct chest 6-19   Initially treated as hcap but later treated as cpap                                                                                                                                                                                          Discharge summary         Provider, please clarify if there is any correlation between __sepsis _______________________ and _pneumonia_________________.           Are the conditions:     [  ] Due to or associated with each other     [  ] Unrelated to each other     [  ] Other (Please Specify): _________________________     [  ] Clinically Undetermined

## 2017-07-02 NOTE — PT/OT/SLP DISCHARGE
Occupational Therapy Discharge Summary    Joseph Valerio  MRN: [id number]   Acute hypoxemic respiratory failure   Patient Discharged from acute Occupational Therapy on 6/20/17.  Please refer to prior OT note dated on 6/19/17 for functional status.     Assessment:   Patient was discharge unexpectedly.  Information required to complete and accurate discharge summary is unknown.  Refer to therapy initial evaluation and last progress note for initial and most recent functional status and goal achievement.  Recommendations made may be found in medical record.  GOALS:    Occupational Therapy Goals        Problem: Occupational Therapy Goal    Goal Priority Disciplines Outcome Interventions   Occupational Therapy Goal     OT, PT/OT Ongoing (interventions implemented as appropriate)    Description:  Goals to be met by: 6/26/17    Patient will increase functional independence with ADLs by performing:    LE Dressing with Set-up Assistance.  Grooming while standing at sink with Modified [place].  Toileting from toilet with Modified [place] for hygiene and clothing management.   Supine to sit with [place].  Stand pivot transfers with Modified [place].  Toilet transfer to toilet with Modified [place].                    Reasons for Discontinuation of Therapy Services  Transfer to alternate level of care.      Plan:  Patient Discharged to: Home no PT services needed.

## 2017-07-05 ENCOUNTER — OFFICE VISIT (OUTPATIENT)
Dept: INTERNAL MEDICINE | Facility: CLINIC | Age: 82
End: 2017-07-05
Payer: MEDICARE

## 2017-07-05 VITALS
HEART RATE: 69 BPM | WEIGHT: 237.63 LBS | OXYGEN SATURATION: 97 % | DIASTOLIC BLOOD PRESSURE: 56 MMHG | BODY MASS INDEX: 33.27 KG/M2 | SYSTOLIC BLOOD PRESSURE: 110 MMHG | HEIGHT: 71 IN

## 2017-07-05 DIAGNOSIS — N40.0 BENIGN NON-NODULAR PROSTATIC HYPERPLASIA WITHOUT LOWER URINARY TRACT SYMPTOMS: ICD-10-CM

## 2017-07-05 DIAGNOSIS — E08.42 DIABETIC POLYNEUROPATHY ASSOCIATED WITH DIABETES MELLITUS DUE TO UNDERLYING CONDITION: ICD-10-CM

## 2017-07-05 DIAGNOSIS — E78.2 MIXED HYPERLIPIDEMIA: ICD-10-CM

## 2017-07-05 DIAGNOSIS — E11.22 TYPE 2 DIABETES MELLITUS WITH STAGE 2 CHRONIC KIDNEY DISEASE, UNSPECIFIED LONG TERM INSULIN USE STATUS: ICD-10-CM

## 2017-07-05 DIAGNOSIS — I10 ESSENTIAL HYPERTENSION: ICD-10-CM

## 2017-07-05 DIAGNOSIS — N18.2 TYPE 2 DIABETES MELLITUS WITH STAGE 2 CHRONIC KIDNEY DISEASE, UNSPECIFIED LONG TERM INSULIN USE STATUS: ICD-10-CM

## 2017-07-05 DIAGNOSIS — J98.4 PNEUMONITIS: Primary | ICD-10-CM

## 2017-07-05 DIAGNOSIS — M51.9 LUMBAR DISC DISEASE: ICD-10-CM

## 2017-07-05 DIAGNOSIS — R93.89 ABNORMAL CXR: ICD-10-CM

## 2017-07-05 DIAGNOSIS — I48.91 ATRIAL FIBRILLATION, UNSPECIFIED TYPE: ICD-10-CM

## 2017-07-05 PROCEDURE — 1159F MED LIST DOCD IN RCRD: CPT | Mod: ,,, | Performed by: INTERNAL MEDICINE

## 2017-07-05 PROCEDURE — 99215 OFFICE O/P EST HI 40 MIN: CPT | Mod: PBBFAC,PO | Performed by: INTERNAL MEDICINE

## 2017-07-05 PROCEDURE — 99999 PR PBB SHADOW E&M-EST. PATIENT-LVL V: CPT | Mod: PBBFAC,,, | Performed by: INTERNAL MEDICINE

## 2017-07-05 PROCEDURE — 1126F AMNT PAIN NOTED NONE PRSNT: CPT | Mod: ,,, | Performed by: INTERNAL MEDICINE

## 2017-07-05 PROCEDURE — 99214 OFFICE O/P EST MOD 30 MIN: CPT | Mod: S$PBB,,, | Performed by: INTERNAL MEDICINE

## 2017-07-05 RX ORDER — CARVEDILOL PHOSPHATE 20 MG/1
20 CAPSULE, EXTENDED RELEASE ORAL DAILY
Qty: 90 CAPSULE | Refills: 3 | Status: SHIPPED | OUTPATIENT
Start: 2017-07-05 | End: 2017-07-10

## 2017-07-05 RX ORDER — TAMSULOSIN HYDROCHLORIDE 0.4 MG/1
0.4 CAPSULE ORAL DAILY
Qty: 90 CAPSULE | Refills: 3 | Status: SHIPPED | OUTPATIENT
Start: 2017-07-05

## 2017-07-05 RX ORDER — FUROSEMIDE 40 MG/1
40 TABLET ORAL DAILY
Qty: 90 TABLET | Refills: 2 | Status: SHIPPED | OUTPATIENT
Start: 2017-07-05

## 2017-07-05 RX ORDER — OXYCODONE HCL 20 MG/1
TABLET, FILM COATED, EXTENDED RELEASE ORAL
Qty: 90 TABLET | Refills: 0 | Status: SHIPPED | OUTPATIENT
Start: 2017-07-05 | End: 2017-08-30 | Stop reason: SDUPTHER

## 2017-07-05 RX ORDER — INSULIN GLARGINE 100 [IU]/ML
50 INJECTION, SOLUTION SUBCUTANEOUS DAILY
Qty: 6 VIAL | Refills: 4 | Status: SHIPPED | OUTPATIENT
Start: 2017-07-05 | End: 2017-11-20 | Stop reason: SDUPTHER

## 2017-07-05 RX ORDER — LANSOPRAZOLE 30 MG/1
30 TABLET, ORALLY DISINTEGRATING, DELAYED RELEASE ORAL DAILY
Qty: 90 TABLET | Refills: 3 | Status: ON HOLD | OUTPATIENT
Start: 2017-07-05 | End: 2017-12-28

## 2017-07-05 RX ORDER — GABAPENTIN 300 MG/1
CAPSULE ORAL
Qty: 90 CAPSULE | Refills: 3 | Status: SHIPPED | OUTPATIENT
Start: 2017-07-05

## 2017-07-05 RX ORDER — DOXYCYCLINE HYCLATE 100 MG
100 TABLET ORAL 2 TIMES DAILY
Qty: 20 TABLET | Refills: 0 | Status: ON HOLD | OUTPATIENT
Start: 2017-07-05 | End: 2017-11-07

## 2017-07-05 RX ORDER — EZETIMIBE AND SIMVASTATIN 10; 20 MG/1; MG/1
TABLET ORAL
Qty: 90 TABLET | Refills: 2 | Status: ON HOLD | OUTPATIENT
Start: 2017-07-05 | End: 2017-11-08 | Stop reason: SINTOL

## 2017-07-05 NOTE — PROGRESS NOTES
Subjective:       Patient ID: Joseph Valerio is a 81 y.o. male.    Chief Complaint: Follow-up (Pneumonia) and Transitional Care    HPI Pt presents for f/u s/p admission to hospital for pneumonia after being found with mental status change  By his son. He was dx'd with bilateral pneumonia and started on IVF,anbx, and 2LNC. Due to hyoxia on admission,   CT Chest(and V/Q scan) and CFVD/Legs was done and normal. He was started on Vanc/Cefepime and Azithromycin and improved rapidly.  He was sent home on Morfloxacin prior to Pulmonary consult being completely due to his desire for D/C.  His Losartan/HCTZ was held but pt seems to still be taking this medication-he's unsure/will have to check home meds.  He has no SOB,no CP,no F/C, no cough. He completed his anbx He is a little weak with ambulation and comes in today with a cane.  Review of Systems   Constitutional: Positive for fatigue. Negative for chills and fever.   HENT: Positive for congestion.         + Nasal congestion   Respiratory: Negative for cough, chest tightness and shortness of breath.    Cardiovascular: Negative for chest pain.   Genitourinary: Negative.    Musculoskeletal: Positive for back pain.        + Chronic L-DDD/OA Sx   Skin: Positive for color change.        + Small area of discoloration over left lateral dorsal  Foot/? Insect bite-no trauma by hx   Neurological: Positive for weakness.        + Gen'l weakness       Objective:    CXR(6/28)-shows continued bilateral multifocal areas of consolidation    Lab(6/28)-BNP improved at 295(last at 479), BUN/Creat at 60/2.7 (last at 72/2,3)                     WBC-normal and H/H improved at 10/32%(last at 8/28%)  Lab Results   Component Value Date    WBC 10.37 06/28/2017    HGB 10.0 (L) 06/28/2017    HCT 32.4 (L) 06/28/2017     06/28/2017    CHOL 97 (L) 12/12/2016    TRIG 91 12/12/2016    HDL 27 (L) 12/12/2016    ALT 20 06/20/2017    AST 15 06/20/2017     06/28/2017    K 5.1 06/28/2017    CL  116 (H) 06/28/2017    CREATININE 2.7 (H) 06/28/2017    BUN 60 (H) 06/28/2017    CO2 18 (L) 06/28/2017    TSH 1.391 08/16/2016    PSA 1.3 07/15/2015    INR 1.1 06/18/2017    HGBA1C 6.5 (H) 06/18/2017     Physical Exam   Constitutional: He is oriented to person, place, and time. He appears well-developed and well-nourished. No distress.   HENT:   Head: Normocephalic and atraumatic.   Naasal mucosal membranes swollen/no erythema   Neck: Normal range of motion. Neck supple. No JVD present.   Cardiovascular:   Heart rhythm is irregular/rate controlled at 70   Pulmonary/Chest: Effort normal.   Breath sounds throughout are coarse/no wheezing/no crackles   Musculoskeletal: He exhibits edema.   + Mild erythema/tenderness over dorsal lateral 4th/5th MT bones/no anatomical abnormalities noted   Lymphadenopathy:     He has no cervical adenopathy.   Neurological: He is alert and oriented to person, place, and time.   Skin: He is not diaphoretic. There is erythema.   + Chronic venous stasis type changes bilateral pre-tibial locals with 1++ edema   Psychiatric: He has a normal mood and affect.       Assessment:       1. Pneumonitis    2. Mixed hyperlipidemia    3. Lumbar disc disease    4. Essential hypertension    5. Benign non-nodular prostatic hyperplasia without lower urinary tract symptoms    6. Type 2 diabetes mellitus with stage 2 chronic kidney disease, unspecified long term insulin use status    7. Diabetic polyneuropathy associated with diabetes mellitus due to underlying condition    8. Abnormal CXR    9. Atrial fibrillation, unspecified type        Plan:     Health Maintenance       Date Due Completion Date    Zoster Vaccine 08/11/1996 ---    Pneumococcal (65+) (1 of 2 - PCV13) 08/11/2001 ---    Urine Microalbumin 03/11/2016 3/11/2015    Influenza Vaccine 08/01/2017 11/18/2014    Eye Exam 09/20/2017 9/20/2016 (Done)    Override on 9/20/2016: Done    Override on 9/20/2016: Done (Dr Hammond/No diabetic retinopathy)     Lipid Panel 12/12/2017 12/12/2016    Hemoglobin A1c 12/18/2017 6/18/2017    Foot Exam 04/05/2018 4/5/2017 (Done)    Override on 4/5/2017: Done (Per Dr Rojas)    TETANUS VACCINE 03/18/2025 3/18/2015        Joseph was seen today for follow-up and transitional care.    Diagnoses and all orders for this visit:    Pneumonitis/Unresolved but with normal VS and O2 Saturation  -     doxycycline (VIBRA-TABS) 100 MG tablet; Take 1 tablet (100 mg total) by mouth 2 (two) times daily.  -     Ambulatory referral to Pulmonology    Mixed hyperlipidemia  -     ezetimibe-simvastatin 10-20 mg (VYTORIN) 10-20 mg per tablet; TAKE 1 TABLET EVERY EVENING AT BEDTIME FOR CHOLESTEROL        -     Check fasting lab with RTC x 3 months    Lumbar disc disease in pt with CRI Stage 4  -     oxycodone (OXYCONTIN) 20 mg 12 hr tablet; 1 tab Q12 hours as needed for Lumbar/Back pain    Essential hypertension/BP a little low but meds unclear  -     furosemide (LASIX) 40 MG tablet; Take 1 tablet (40 mg total) by mouth once daily.  -     Call pt to clarify BP medications  -     CBC auto differential; Future  -     Comprehensive metabolic panel; Future  -     Lipid panel; Future  -     TSH; Future    Benign non-nodular prostatic hyperplasia without lower urinary tract symptoms  -     tamsulosin (FLOMAX) 0.4 mg Cp24; Take 1 capsule (0.4 mg total) by mouth once daily. 1 Capsule, Sust. Release 24 hr Oral Every day.  for prostate    Type 2 diabetes mellitus with stage 2 chronic kidney disease, unspecified long term insulin use status  -     SITagliptin (JANUVIA) 50 MG Tab; Take 1 tablet (50 mg total) by mouth once daily. For Diabetes  -     insulin glargine (LANTUS) 100 unit/mL injection; Inject 50 Units into the skin once daily. 50 units Subcutaneous Every morning.  Pt needs 90 days of lantus-takes 50 units QHS  -     Hemoglobin A1c; Future    Diabetic polyneuropathy associated with diabetes mellitus due to underlying condition  -     gabapentin (NEURONTIN)  300 MG capsule; TAKE 1 CAPSULE BY MOUTH AT BEDTIME FOR DIABETIC NEUROPATHY    Abnormal CXR/? Pneumonitis vs Other(CHF,Wegener's)        -     Repeat Antibiotic coarse with Doxycycline  -     Ambulatory referral to Pulmonology  -     ANTI-NEUTROPHILIC CYTOPLASMIC ANTIBODY; Future  -     2D ECHO/Cardiology Follow up     Atrial fibrillation, unspecified type-rate controlled       -      Xarelto 20mg daily to be ciontinued  -     2D Echo w/ Color Flow Doppler; Future  -     Ambulatory referral to Cardiology/Arrhythmia    Health Maintenance        -      RTC x 6-8 weeks follow up/review.

## 2017-07-06 ENCOUNTER — TELEPHONE (OUTPATIENT)
Dept: INTERNAL MEDICINE | Facility: CLINIC | Age: 82
End: 2017-07-06

## 2017-07-06 NOTE — TELEPHONE ENCOUNTER
Spoke with pt ie his medications: he has continued to take the Losartan/HCTZ inadvertantly.  With his pre-renal status(BUN/Creat at 60/2.7) I have recommended he STOP the Losartan/HCTZ-he agrees. 'Will recheck BMP with RTC x 6-8 weeks.

## 2017-07-07 ENCOUNTER — TELEPHONE (OUTPATIENT)
Dept: ELECTROPHYSIOLOGY | Facility: CLINIC | Age: 82
End: 2017-07-07

## 2017-07-07 DIAGNOSIS — I48.91 ATRIAL FIBRILLATION, UNSPECIFIED TYPE: Primary | ICD-10-CM

## 2017-07-07 NOTE — TELEPHONE ENCOUNTER
Called pt and confirmed appts for 7/10. Pt stated no devices in chest and no outside cardiology records

## 2017-07-10 ENCOUNTER — HOSPITAL ENCOUNTER (OUTPATIENT)
Dept: CARDIOLOGY | Facility: CLINIC | Age: 82
Discharge: HOME | End: 2017-07-10
Payer: MEDICARE

## 2017-07-10 ENCOUNTER — INITIAL CONSULT (OUTPATIENT)
Dept: ELECTROPHYSIOLOGY | Facility: CLINIC | Age: 82
End: 2017-07-10
Payer: MEDICARE

## 2017-07-10 VITALS
BODY MASS INDEX: 34.02 KG/M2 | WEIGHT: 237.63 LBS | SYSTOLIC BLOOD PRESSURE: 112 MMHG | DIASTOLIC BLOOD PRESSURE: 76 MMHG | HEIGHT: 70 IN | HEART RATE: 45 BPM

## 2017-07-10 DIAGNOSIS — I25.10 CORONARY ARTERY DISEASE INVOLVING NATIVE CORONARY ARTERY WITHOUT ANGINA PECTORIS, UNSPECIFIED WHETHER NATIVE OR TRANSPLANTED HEART: ICD-10-CM

## 2017-07-10 DIAGNOSIS — E08.21 DIABETES MELLITUS DUE TO UNDERLYING CONDITION WITH DIABETIC NEPHROPATHY, UNSPECIFIED LONG TERM INSULIN USE STATUS: ICD-10-CM

## 2017-07-10 DIAGNOSIS — E11.9 TYPE 2 DIABETES MELLITUS WITHOUT COMPLICATION: ICD-10-CM

## 2017-07-10 DIAGNOSIS — N18.30 CKD (CHRONIC KIDNEY DISEASE), STAGE III: ICD-10-CM

## 2017-07-10 DIAGNOSIS — I48.19 PERSISTENT ATRIAL FIBRILLATION: ICD-10-CM

## 2017-07-10 DIAGNOSIS — I15.0 RENOVASCULAR HYPERTENSION: ICD-10-CM

## 2017-07-10 DIAGNOSIS — I48.91 ATRIAL FIBRILLATION, UNSPECIFIED TYPE: ICD-10-CM

## 2017-07-10 DIAGNOSIS — E78.2 MIXED HYPERLIPIDEMIA: ICD-10-CM

## 2017-07-10 LAB
AORTIC VALVE REGURGITATION: ABNORMAL
AORTIC VALVE STENOSIS: ABNORMAL
ESTIMATED PA SYSTOLIC PRESSURE: 39.14
GLOBAL PERICARDIAL EFFUSION: ABNORMAL
MITRAL VALVE REGURGITATION: ABNORMAL
RETIRED EF AND QEF - SEE NOTES: 63 (ref 55–65)
TRICUSPID VALVE REGURGITATION: ABNORMAL

## 2017-07-10 PROCEDURE — 99205 OFFICE O/P NEW HI 60 MIN: CPT | Mod: S$PBB,,, | Performed by: INTERNAL MEDICINE

## 2017-07-10 PROCEDURE — 99214 OFFICE O/P EST MOD 30 MIN: CPT | Mod: PBBFAC,25 | Performed by: INTERNAL MEDICINE

## 2017-07-10 PROCEDURE — 1126F AMNT PAIN NOTED NONE PRSNT: CPT | Mod: ,,, | Performed by: INTERNAL MEDICINE

## 2017-07-10 PROCEDURE — 93005 ELECTROCARDIOGRAM TRACING: CPT | Mod: PBBFAC | Performed by: INTERNAL MEDICINE

## 2017-07-10 PROCEDURE — 99999 PR PBB SHADOW E&M-EST. PATIENT-LVL IV: CPT | Mod: PBBFAC,,, | Performed by: INTERNAL MEDICINE

## 2017-07-10 PROCEDURE — 93010 ELECTROCARDIOGRAM REPORT: CPT | Mod: S$PBB,,, | Performed by: INTERNAL MEDICINE

## 2017-07-10 PROCEDURE — 93306 TTE W/DOPPLER COMPLETE: CPT | Mod: 26,S$PBB,, | Performed by: INTERNAL MEDICINE

## 2017-07-10 PROCEDURE — 1159F MED LIST DOCD IN RCRD: CPT | Mod: ,,, | Performed by: INTERNAL MEDICINE

## 2017-07-10 NOTE — PROGRESS NOTES
Subjective:     HPI    PCP: Thompson Stiles MD    I had the pleasure of seeing Joseph Valerio in consultation at your request for the evaluation of atrial fibrillation. He is an 81 year old male with a history of HTN, HLD, CKD stage III, and DM2. He is currently not on an anticoagulant. He has no recent falls, and denies bleeding issues.    An echo performed today shows an EF of 60-65%, and severe LAE.    I reviewed all ECGs in the EMR. An ECG from 2009 shows sinus rhythm, while ECGs from 2014 and 2017 show AF with a slow ventricular response.    I reviewed today's ECG tracing, which shows AF at 45 bpm.    Review of Systems   Constitution: Positive for malaise/fatigue. Negative for decreased appetite, weight gain and weight loss.   HENT: Negative for sore throat.    Eyes: Negative for blurred vision.   Cardiovascular: Negative for chest pain, dyspnea on exertion, irregular heartbeat, leg swelling, near-syncope, orthopnea, palpitations, paroxysmal nocturnal dyspnea and syncope.   Respiratory: Negative for shortness of breath.    Skin: Negative for rash.   Musculoskeletal: Positive for back pain. Negative for arthritis.   Gastrointestinal: Negative for abdominal pain.   Neurological: Negative for focal weakness.   Psychiatric/Behavioral: Negative for altered mental status.        Objective:    Physical Exam   Constitutional: He is oriented to person, place, and time. He appears well-developed and well-nourished. No distress.   HENT:   Head: Normocephalic and atraumatic.   Mouth/Throat: Oropharynx is clear and moist.   Eyes: Pupils are equal, round, and reactive to light. No scleral icterus.   Neck: Neck supple. No thyromegaly present.   Cardiovascular: Regular rhythm, normal heart sounds and normal pulses.  Exam reveals no gallop and no friction rub.    No murmur heard.  Pulmonary/Chest: Effort normal and breath sounds normal. He has no rales.   Abdominal: Soft. Bowel sounds are normal. He exhibits no distension.  There is no tenderness.   Musculoskeletal: He exhibits no edema.   Neurological: He is alert and oriented to person, place, and time.   Skin: Skin is warm and dry. No rash noted.   Psychiatric: He has a normal mood and affect. His behavior is normal.   Vitals reviewed.        Assessment:       1. Persistent atrial fibrillation    2. Renovascular hypertension    3. Coronary artery disease involving native coronary artery without angina pectoris, unspecified whether native or transplanted heart    4. Diabetes mellitus due to underlying condition with diabetic nephropathy, unspecified long term insulin use status    5. CKD (chronic kidney disease), stage III    6. Mixed hyperlipidemia         Plan:       In summary, Joseph Valerio is an 81 year old male with asymptomatic longstanding persistent AF. His [id number] Score is 4 (age, HTN, DM2). I have started Eliquis 2.5 mg bid. He is profoundly bradycardic, so I have also stopped Coreg. I have ordered a 24 hour Holter to be performed in 2 weeks. He will see me in 3 months or sooner if needed.    Thank you for allowing me to participate in the care of this patient. Please do not hesitate to call me with any questions or concerns.

## 2017-07-10 NOTE — LETTER
July 10, 2017      Thompson Stiles MD  [address]  [facility] [address]           [facility] - Arr[facility]  [address]  [facility] [address]  Phone: [phone]  Fax: [phone]          Patient: Joseph Valerio   MR Number: [id number]   YOB: 1935   Date of Visit: 7/10/2017       Dear Dr. Thompson Stiles:    Thank you for referring Joseph Valerio to me for evaluation. Attached you will find relevant portions of my assessment and plan of care.    If you have questions, please do not hesitate to call me. I look forward to following Joseph Valerio along with you.    Sincerely,    Jian Contreras MD    Enclosure  CC:  No Recipients    If you would like to receive this communication electronically, please contact [email or web address] or [phone] to request more information on [employer] Link access.    For providers and/or their staff who would like to refer a patient to [facility], please contact us through our one-stop-shop provider referral line, [facility], at [phone].    If you feel you have received this communication in error or would no longer like to receive these types of communications, please e-mail [email or web address]

## 2017-07-24 NOTE — PROGRESS NOTES
Subjective:       Patient ID: Joseph Valerio is a 81 y.o. male.    Chief Complaint: Abnormal Chest X-ray    HPI:   Joseph Valerio is a 81 y.o. male who presents after a recent hospital admission (6/18/17) during which he was treated for pneumonia.  He was treated with antibiotics and reports that he recovered completely.   Chest CT performed during that stay showed scattered densities bilaterally with evidence of pleural plaques.  Chest xray 10 days later showed some improvement.    He has a history of HTN, DMII, CARMELO on cpap, and Afib (on xarelto, recently taken of coreg due to bradycardia).  Reports that his activity is not limited by shortness of breath, but more by chronic back pain.      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   HENT: Negative for postnasal drip, rhinorrhea, sinus pressure and congestion.    Respiratory: Positive for snoring. Negative for cough, hemoptysis, shortness of breath and dyspnea on extertion.    Cardiovascular: Negative for chest pain and leg swelling.   Genitourinary: Negative for difficulty urinating.   Endocrine: Negative for cold intolerance and heat intolerance.    Musculoskeletal: Positive for back pain (chronic). Negative for joint swelling and myalgias.   Gastrointestinal: Negative for nausea, vomiting and abdominal pain.   Neurological: Negative for headaches.   Hematological: Negative for adenopathy. No excessive bruising.   Psychiatric/Behavioral: Negative for confusion and sleep disturbance.         Social History   Substance Use Topics    Smoking status: Former Smoker     Packs/day: 4.00     Years: 30.00     Quit date: 1/9/1990    Smokeless tobacco: Never Used      Comment: pt was up to 4 packs a day before.    Alcohol use No       Review of patient's  allergies indicates:  No Known Allergies  Past Medical History:   Diagnosis Date    *Atrial fibrillation     Allergy     Aortic sclerosis     BPH (benign prostatic hypertrophy)     Calcium nephrolithiasis     CKD (chronic kidney disease), stage III 10/16/2014    Colon polyps     Coronary artery disease     CPAP (continuous positive airway pressure) dependence     Degenerative disc disease     Diabetes mellitus     Hiatal hernia     Hyperlipidemia     Hyperprolactinemia 12/29/2014    Hypertension     Peripheral vascular disease     Proteinuria     Renal insufficiency     Renovascular hypertension 7/31/2012    Sleep apnea     Venous insufficiency      Past Surgical History:   Procedure Laterality Date    CATARACT EXTRACTION, BILATERAL      CHOLECYSTECTOMY      GASTRIC BYPASS       Current Outpatient Prescriptions on File Prior to Visit   Medication Sig    acetaminophen (TYLENOL ARTHRITIS PAIN) 650 MG TbSR Take 1 tablet (650 mg total) by mouth every 8 (eight) hours. 1 tab 2-3x/day for arthritis pain    apixaban 2.5 mg Tab Take 1 tablet (2.5 mg total) by mouth 2 (two) times daily.    azelastine-fluticasone 137-50 mcg/spray Spry nassal spray 1 spray by Each Nare route 2 (two) times daily.    blood sugar diagnostic Strp Check glucose 2-3x/day before meals and/or bedtime    cholestyramine (QUESTRAN) 4 gram packet 1 packet up to daily for Bowel Movements/Diarrhea prevention    cyanocobalamin, vitamin B-12, 1,000 mcg TbSR     doxycycline (VIBRA-TABS) 100 MG tablet Take 1 tablet (100 mg total) by mouth 2 (two) times daily.    ezetimibe-simvastatin 10-20 mg (VYTORIN) 10-20 mg per tablet TAKE 1 TABLET EVERY EVENING AT BEDTIME FOR CHOLESTEROL    ferrous sulfate dried (SLOW FE) 160 mg (50 mg iron) TbSR Take 1 tablet (160 mg total) by mouth once daily.    furosemide (LASIX) 40 MG tablet Take 1 tablet (40 mg total) by mouth once daily.    gabapentin (NEURONTIN) 300 MG capsule TAKE 1 CAPSULE BY  MOUTH AT BEDTIME FOR DIABETIC NEUROPATHY    insulin glargine (LANTUS) 100 unit/mL injection Inject 50 Units into the skin once daily. 50 units Subcutaneous Every morning.  Pt needs 90 days of lantus-takes 50 units QHS    ipratropium (ATROVENT) 0.06 % nasal spray 2 sprays by Nasal route 2 (two) times daily.    lancets (ACCU-CHEK SOFTCLIX LANCETS) Misc 1 strip by Misc.(Non-Drug; Combo Route) route 2 (two) times daily.    lansoprazole (PREVACID SOLUTAB) 30 MG disintegrating tablet Take 1 tablet (30 mg total) by mouth once daily. 1 Tablet,Rapid Dissolve, DR Sublingual Every day.  for acid reflux    ondansetron (ZOFRAN) 8 MG tablet Take 1 tablet (8 mg total) by mouth every 8 (eight) hours as needed for Nausea. 1 tab Q8 hours as needed for nausea    oxycodone (OXYCONTIN) 20 mg 12 hr tablet 1 tab Q12 hours as needed for Lumbar/Back pain    peg-electrolyte soln (TRILYTE WITH FLAVOR PACKETS) 420 gram SolR Take 1 kit by mouth as directed.    PREVACID SOLUTAB 30 mg disintegrating tablet PLACE 1 TABLET (30 MG TOTAL) ON TONGUE ONCE DAILY FOR ACID REFLUX    SITagliptin (JANUVIA) 50 MG Tab Take 1 tablet (50 mg total) by mouth once daily. For Diabetes    tamsulosin (FLOMAX) 0.4 mg Cp24 Take 1 capsule (0.4 mg total) by mouth once daily. 1 Capsule, Sust. Release 24 hr Oral Every day.  for prostate    timolol maleate 0.5% (TIMOPTIC) 0.5 % Drop Place 1 drop into both eyes once daily.     TRAVATAN Z 0.004 % Drop Place 1 drop into both eyes every evening.      No current facility-administered medications on file prior to visit.        Objective:      Physical Exam   Constitutional: He is oriented to person, place, and time. He appears well-developed and well-nourished. No distress.   HENT:   Head: Normocephalic.   Mouth/Throat: Mallampati Score: IV.   Neck: Normal range of motion.   Cardiovascular: Normal rate and normal heart sounds.    No murmur heard.  Pulmonary/Chest: Effort normal and breath sounds normal. He has no  wheezes. He has no rhonchi. He has no rales.   Abdominal: Soft. Bowel sounds are normal. He exhibits no distension. There is no tenderness.   Musculoskeletal: Normal range of motion. He exhibits edema.   Neurological: He is alert and oriented to person, place, and time.   Skin: Skin is warm and dry. He is not diaphoretic. No cyanosis. Nails show no clubbing.   Skin changes on lower extremities consistent with venous insufficiency   Psychiatric: He has a normal mood and affect.     Personal Diagnostic Review    CXR (6/18/2017): scattered densities in the lower lung zones bilaterally concerning for pneumonia. opacification of right costophrenic angle.    CT Chest (6/19/17): patchy opacities in the lower lung zones, pleural plaques  Assessment:     Orders Placed This Encounter   Procedures    CT Chest Without Contrast     Standing Status:   Future     Standing Expiration Date:   7/24/2018     Order Specific Question:   Reason for Exam:     Answer:   3 mo f/u post hospitalization with infectious vs inflammatory process in lungs.     Order Specific Question:   May the Radiologist modify the order per protocol to meet the clinical needs of the patient?     Answer:   Yes    Complete PFT with bronchodilator     Standing Status:   Future     Standing Expiration Date:   7/25/2018     1. Abnormal CT scan, chest    2. Chronic obstructive pulmonary disease, unspecified COPD type      Given the presence of acute infection, the CT Chest is difficult to interpret.  Pleural plaques are 2/2 past asbestos exposure, but do not necessary mean the patient has asbestosis.  Will repeat CT for clarification.   Concerned for potential COPD, and given asbestos exposure, PFTs may be helpful.  Plan:       -Repeat CT Chest in Sept. 2017  -PFTs at next visit.

## 2017-07-25 ENCOUNTER — OFFICE VISIT (OUTPATIENT)
Dept: PULMONOLOGY | Facility: CLINIC | Age: 82
End: 2017-07-25
Payer: MEDICARE

## 2017-07-25 ENCOUNTER — CLINICAL SUPPORT (OUTPATIENT)
Dept: ELECTROPHYSIOLOGY | Facility: CLINIC | Age: 82
End: 2017-07-25
Payer: MEDICARE

## 2017-07-25 VITALS
OXYGEN SATURATION: 99 % | DIASTOLIC BLOOD PRESSURE: 72 MMHG | HEART RATE: 66 BPM | SYSTOLIC BLOOD PRESSURE: 121 MMHG | HEIGHT: 70 IN | WEIGHT: 228.38 LBS | BODY MASS INDEX: 32.69 KG/M2

## 2017-07-25 DIAGNOSIS — Z77.090 ASBESTOS EXPOSURE: ICD-10-CM

## 2017-07-25 DIAGNOSIS — J44.9 CHRONIC OBSTRUCTIVE PULMONARY DISEASE, UNSPECIFIED COPD TYPE: ICD-10-CM

## 2017-07-25 DIAGNOSIS — R93.89 ABNORMAL CT SCAN, CHEST: Primary | ICD-10-CM

## 2017-07-25 DIAGNOSIS — I48.19 PERSISTENT ATRIAL FIBRILLATION: ICD-10-CM

## 2017-07-25 PROCEDURE — 1159F MED LIST DOCD IN RCRD: CPT | Mod: ,,, | Performed by: INTERNAL MEDICINE

## 2017-07-25 PROCEDURE — 99999 PR PBB SHADOW E&M-EST. PATIENT-LVL III: CPT | Mod: PBBFAC,,, | Performed by: INTERNAL MEDICINE

## 2017-07-25 PROCEDURE — 93227 XTRNL ECG REC<48 HR R&I: CPT | Mod: S$PBB,,, | Performed by: INTERNAL MEDICINE

## 2017-07-25 PROCEDURE — 99214 OFFICE O/P EST MOD 30 MIN: CPT | Mod: S$PBB,,, | Performed by: INTERNAL MEDICINE

## 2017-07-25 PROCEDURE — 93226 XTRNL ECG REC<48 HR SCAN A/R: CPT | Mod: PBBFAC | Performed by: INTERNAL MEDICINE

## 2017-07-25 RX ORDER — CARVEDILOL PHOSPHATE 20 MG/1
20 CAPSULE, EXTENDED RELEASE ORAL DAILY
Status: ON HOLD | COMMUNITY
Start: 2017-07-14 | End: 2018-01-10 | Stop reason: HOSPADM

## 2017-07-25 NOTE — LETTER
July 25, 2017      Thompson Stiles MD  [address]  [facility] [address]           [facility] - Pulmonary Services  [address]  [facility] [address]  Phone: [phone]          Patient: Joseph Valerio   MR Number: [id number]   YOB: 1935   Date of Visit: 7/25/2017       Dear Dr. Thompson Stiles:    Thank you for referring Joseph Valerio to me for evaluation. Attached you will find relevant portions of my assessment and plan of care.    If you have questions, please do not hesitate to call me. I look forward to following Joseph Valerio along with you.    Sincerely,    Awilda Ho MD    Enclosure  CC:  No Recipients    If you would like to receive this communication electronically, please contact [email or web address] or [phone] to request more information on "[employer] Link access.    For providers and/or their staff who would like to refer a patient to [facility], please contact us through our one-stop-shop provider referral line, [facility], at [phone].    If you feel you have received this communication in error or would no longer like to receive these types of communications, please e-mail [email or web address]

## 2017-08-23 ENCOUNTER — LAB VISIT (OUTPATIENT)
Dept: LAB | Facility: HOSPITAL | Age: 82
End: 2017-08-23
Attending: INTERNAL MEDICINE
Payer: MEDICARE

## 2017-08-23 DIAGNOSIS — R93.89 ABNORMAL CXR: ICD-10-CM

## 2017-08-23 DIAGNOSIS — I10 ESSENTIAL HYPERTENSION: ICD-10-CM

## 2017-08-23 DIAGNOSIS — E11.22 TYPE 2 DIABETES MELLITUS WITH STAGE 2 CHRONIC KIDNEY DISEASE, UNSPECIFIED LONG TERM INSULIN USE STATUS: ICD-10-CM

## 2017-08-23 DIAGNOSIS — N18.2 TYPE 2 DIABETES MELLITUS WITH STAGE 2 CHRONIC KIDNEY DISEASE, UNSPECIFIED LONG TERM INSULIN USE STATUS: ICD-10-CM

## 2017-08-23 LAB
ALBUMIN SERPL BCP-MCNC: 3.1 G/DL
ALP SERPL-CCNC: 86 U/L
ALT SERPL W/O P-5'-P-CCNC: 13 U/L
ANION GAP SERPL CALC-SCNC: 9 MMOL/L
AST SERPL-CCNC: 11 U/L
BASOPHILS # BLD AUTO: 0.03 K/UL
BASOPHILS NFR BLD: 0.5 %
BILIRUB SERPL-MCNC: 1.1 MG/DL
BNP SERPL-MCNC: 286 PG/ML
BUN SERPL-MCNC: 88 MG/DL
CALCIUM SERPL-MCNC: 9.1 MG/DL
CHLORIDE SERPL-SCNC: 111 MMOL/L
CHOLEST/HDLC SERPL: 4.8 {RATIO}
CO2 SERPL-SCNC: 24 MMOL/L
CREAT SERPL-MCNC: 2.1 MG/DL
DIFFERENTIAL METHOD: ABNORMAL
EOSINOPHIL # BLD AUTO: 0.1 K/UL
EOSINOPHIL NFR BLD: 1.5 %
ERYTHROCYTE [DISTWIDTH] IN BLOOD BY AUTOMATED COUNT: 13 %
EST. GFR  (AFRICAN AMERICAN): 33.1 ML/MIN/1.73 M^2
EST. GFR  (NON AFRICAN AMERICAN): 28.7 ML/MIN/1.73 M^2
ESTIMATED AVG GLUCOSE: 126 MG/DL
GLUCOSE SERPL-MCNC: 127 MG/DL
HBA1C MFR BLD HPLC: 6 %
HCT VFR BLD AUTO: 33.9 %
HDL/CHOLESTEROL RATIO: 20.9 %
HDLC SERPL-MCNC: 110 MG/DL
HDLC SERPL-MCNC: 23 MG/DL
HGB BLD-MCNC: 11 G/DL
LDLC SERPL CALC-MCNC: 61.2 MG/DL
LYMPHOCYTES # BLD AUTO: 1.2 K/UL
LYMPHOCYTES NFR BLD: 20.7 %
MCH RBC QN AUTO: 28.4 PG
MCHC RBC AUTO-ENTMCNC: 32.4 G/DL
MCV RBC AUTO: 87 FL
MONOCYTES # BLD AUTO: 0.5 K/UL
MONOCYTES NFR BLD: 7.9 %
NEUTROPHILS # BLD AUTO: 4 K/UL
NEUTROPHILS NFR BLD: 68.7 %
NONHDLC SERPL-MCNC: 87 MG/DL
PLATELET # BLD AUTO: 261 K/UL
PMV BLD AUTO: 10.7 FL
POTASSIUM SERPL-SCNC: 5 MMOL/L
PROT SERPL-MCNC: 7 G/DL
RBC # BLD AUTO: 3.88 M/UL
SODIUM SERPL-SCNC: 144 MMOL/L
TRIGL SERPL-MCNC: 129 MG/DL
TSH SERPL DL<=0.005 MIU/L-ACNC: 0.89 UIU/ML
WBC # BLD AUTO: 5.84 K/UL

## 2017-08-23 PROCEDURE — 83880 ASSAY OF NATRIURETIC PEPTIDE: CPT

## 2017-08-23 PROCEDURE — 80061 LIPID PANEL: CPT

## 2017-08-23 PROCEDURE — 84443 ASSAY THYROID STIM HORMONE: CPT

## 2017-08-23 PROCEDURE — 86255 FLUORESCENT ANTIBODY SCREEN: CPT

## 2017-08-23 PROCEDURE — 36415 COLL VENOUS BLD VENIPUNCTURE: CPT | Mod: PO

## 2017-08-23 PROCEDURE — 80053 COMPREHEN METABOLIC PANEL: CPT

## 2017-08-23 PROCEDURE — 85025 COMPLETE CBC W/AUTO DIFF WBC: CPT

## 2017-08-23 PROCEDURE — 83036 HEMOGLOBIN GLYCOSYLATED A1C: CPT

## 2017-08-25 LAB
ANCA AB TITR SER IF: NORMAL TITER
P-ANCA TITR SER IF: NORMAL TITER

## 2017-08-30 ENCOUNTER — OFFICE VISIT (OUTPATIENT)
Dept: INTERNAL MEDICINE | Facility: CLINIC | Age: 82
End: 2017-08-30
Payer: MEDICARE

## 2017-08-30 VITALS
BODY MASS INDEX: 32.51 KG/M2 | SYSTOLIC BLOOD PRESSURE: 154 MMHG | DIASTOLIC BLOOD PRESSURE: 56 MMHG | HEIGHT: 70 IN | WEIGHT: 227.06 LBS | OXYGEN SATURATION: 99 % | HEART RATE: 70 BPM

## 2017-08-30 DIAGNOSIS — N18.4 CRI (CHRONIC RENAL INSUFFICIENCY), STAGE 4 (SEVERE): ICD-10-CM

## 2017-08-30 DIAGNOSIS — E78.2 MIXED HYPERLIPIDEMIA: ICD-10-CM

## 2017-08-30 DIAGNOSIS — M51.9 LUMBAR DISC DISEASE: Primary | ICD-10-CM

## 2017-08-30 DIAGNOSIS — I48.19 PERSISTENT ATRIAL FIBRILLATION: ICD-10-CM

## 2017-08-30 DIAGNOSIS — N18.4 CONTROLLED TYPE 2 DIABETES MELLITUS WITH STAGE 4 CHRONIC KIDNEY DISEASE, WITH LONG-TERM CURRENT USE OF INSULIN: ICD-10-CM

## 2017-08-30 DIAGNOSIS — Z79.4 CONTROLLED TYPE 2 DIABETES MELLITUS WITH STAGE 4 CHRONIC KIDNEY DISEASE, WITH LONG-TERM CURRENT USE OF INSULIN: ICD-10-CM

## 2017-08-30 DIAGNOSIS — E11.22 CONTROLLED TYPE 2 DIABETES MELLITUS WITH STAGE 4 CHRONIC KIDNEY DISEASE, WITH LONG-TERM CURRENT USE OF INSULIN: ICD-10-CM

## 2017-08-30 PROCEDURE — 3077F SYST BP >= 140 MM HG: CPT | Mod: ,,, | Performed by: INTERNAL MEDICINE

## 2017-08-30 PROCEDURE — 99999 PR PBB SHADOW E&M-EST. PATIENT-LVL V: CPT | Mod: PBBFAC,,, | Performed by: INTERNAL MEDICINE

## 2017-08-30 PROCEDURE — 99215 OFFICE O/P EST HI 40 MIN: CPT | Mod: PBBFAC,PO | Performed by: INTERNAL MEDICINE

## 2017-08-30 PROCEDURE — 1126F AMNT PAIN NOTED NONE PRSNT: CPT | Mod: ,,, | Performed by: INTERNAL MEDICINE

## 2017-08-30 PROCEDURE — 3078F DIAST BP <80 MM HG: CPT | Mod: ,,, | Performed by: INTERNAL MEDICINE

## 2017-08-30 PROCEDURE — 99214 OFFICE O/P EST MOD 30 MIN: CPT | Mod: S$PBB,,, | Performed by: INTERNAL MEDICINE

## 2017-08-30 PROCEDURE — 1159F MED LIST DOCD IN RCRD: CPT | Mod: ,,, | Performed by: INTERNAL MEDICINE

## 2017-08-30 RX ORDER — OXYCODONE HYDROCHLORIDE 30 MG/1
30 TABLET, FILM COATED, EXTENDED RELEASE ORAL EVERY 12 HOURS
Qty: 90 TABLET | Refills: 0 | Status: SHIPPED | OUTPATIENT
Start: 2017-08-30 | End: 2017-11-20

## 2017-08-30 NOTE — PROGRESS NOTES
"Subjective:       Patient ID: Joseph Valerio is a 82 y.o. male.    Chief Complaint: Follow-up (pneumonitis) and Diabetes    HPI Mr Valerio presents today for f/u the above.  He I ss/p Pulmonary evaluation per Dr Ho  And is pending a f/u CT Chests/PFTs and f/u Appt.  He is doing ok-he has had  no further acute episodes of SOB;  He is not using an inhaler and doesn't feel that he needs one.  He is doing ok on Eliquis as per Dr Contreras in  [facility]hythm[facility] Cardiology for chronic atrial fibrillation-no CP,no Palpitations. I've recommended he see/follow up in  Nephrology for his CRI, but he refuses such saying he's '"tired of seeing doctors". He has lost weight with healthy   Diet which he is working on.    Current Outpatient Prescriptions   Medication Sig Dispense Refill    acetaminophen (TYLENOL ARTHRITIS PAIN) 650 MG TbSR Take 1 tablet (650 mg total) by mouth every 8 (eight) hours. 1 tab 2-3x/day for arthritis pain 90 tablet prn    apixaban 2.5 mg Tab Take 1 tablet (2.5 mg total) by mouth 2 (two) times daily. 60 tablet 11    azelastine-fluticasone 137-50 mcg/spray Spry nassal spray 1 spray by Each Nare route 2 (two) times daily. 23 g 3    blood sugar diagnostic Strp Check glucose 2-3x/day before meals and/or bedtime 300 strip 3    cholestyramine (QUESTRAN) 4 gram packet 1 packet up to daily for Bowel Movements/Diarrhea prevention 90 packet 3    COREG CR 20 mg 24 hr capsule       cyanocobalamin, vitamin B-12, 1,000 mcg TbSR       doxycycline (VIBRA-TABS) 100 MG tablet Take 1 tablet (100 mg total) by mouth 2 (two) times daily. 20 tablet 0    ezetimibe-simvastatin 10-20 mg (VYTORIN) 10-20 mg per tablet TAKE 1 TABLET EVERY EVENING AT BEDTIME FOR CHOLESTEROL 90 tablet 2    ferrous sulfate dried (SLOW FE) 160 mg (50 mg iron) TbSR Take 1 tablet (160 mg total) by mouth once daily. 30 tablet 3    furosemide (LASIX) 40 MG tablet Take 1 tablet (40 mg total) by mouth once daily. 90 tablet 2    gabapentin " (NEURONTIN) 300 MG capsule TAKE 1 CAPSULE BY MOUTH AT BEDTIME FOR DIABETIC NEUROPATHY 90 capsule 3    insulin glargine (LANTUS) 100 unit/mL injection Inject 50 Units into the skin once daily. 50 units Subcutaneous Every morning.  Pt needs 90 days of lantus-takes 50 units QHS 6 vial 4    ipratropium (ATROVENT) 0.06 % nasal spray 2 sprays by Nasal route 2 (two) times daily. 15 mL 6    lancets (ACCU-CHEK SOFTCLIX LANCETS) Misc 1 strip by Misc.(Non-Drug; Combo Route) route 2 (two) times daily. 180 each 3    lansoprazole (PREVACID SOLUTAB) 30 MG disintegrating tablet Take 1 tablet (30 mg total) by mouth once daily. 1 Tablet,Rapid Dissolve, DR Sublingual Every day.  for acid reflux 90 tablet 3    ondansetron (ZOFRAN) 8 MG tablet Take 1 tablet (8 mg total) by mouth every 8 (eight) hours as needed for Nausea. 1 tab Q8 hours as needed for nausea 15 tablet 0    oxycodone (OXYCONTIN) 30 mg TR12 12 hr tablet Take 1 tablet (30 mg total) by mouth every 12 (twelve) hours. 1 tab Q12 hours as needed for Lumbar/Back pain 90 tablet 0    peg-electrolyte soln (TRILYTE WITH FLAVOR PACKETS) 420 gram SolR Take 1 kit by mouth as directed. 1 Bottle 0    SITagliptin (JANUVIA) 50 MG Tab Take 1 tablet (50 mg total) by mouth once daily. For Diabetes 90 tablet 2    tamsulosin (FLOMAX) 0.4 mg Cp24 Take 1 capsule (0.4 mg total) by mouth once daily. 1 Capsule, Sust. Release 24 hr Oral Every day.  for prostate 90 capsule 3    timolol maleate 0.5% (TIMOPTIC) 0.5 % Drop Place 1 drop into both eyes once daily.       TRAVATAN Z 0.004 % Drop Place 1 drop into both eyes every evening.        No current facility-administered medications for this visit.        Review of Systems   Musculoskeletal: Positive for back pain.        +Chronic Lumbar OA/DDD       Objective:      Lab Results   Component Value Date    WBC 5.84 08/23/2017    HGB 11.0 (L) 08/23/2017    HCT 33.9 (L) 08/23/2017     08/23/2017    CHOL 110 (L) 08/23/2017    TRIG 129  08/23/2017    HDL 23 (L) 08/23/2017    ALT 13 08/23/2017    AST 11 08/23/2017     08/23/2017    K 5.0 08/23/2017     (H) 08/23/2017    CREATININE 2.1 (H) 08/23/2017    BUN 88 (H) 08/23/2017    CO2 24 08/23/2017    TSH 0.886 08/23/2017    PSA 1.3 07/15/2015    INR 1.1 06/18/2017    HGBA1C 6.0 (H) 08/23/2017     Physical Exam   Constitutional: He appears well-developed and well-nourished.   Neck: Normal range of motion. Neck supple. No JVD present. No thyromegaly present.   Cardiovascular: Normal rate.    + Irregular rhythm/rate controlled at 72   Lymphadenopathy:     He has no cervical adenopathy.   Skin: Skin is warm and dry. There is erythema.   + Chronic venous stasis type changes with violaceous discoloration of pre-tibial locations/no edema   Vitals reviewed.      Assessment:       1. Lumbar disc disease    2. Controlled type 2 diabetes mellitus with stage 4 chronic kidney disease, with long-term current use of insulin    3. Mixed hyperlipidemia    4. Persistent atrial fibrillation    5. CRI (chronic renal insufficiency), stage 4 (severe)        Plan:     Health Maintenance       Date Due Completion Date    Zoster Vaccine 08/11/1996 ---    Pneumococcal (65+) (1 of 2 - PCV13) 08/11/2001 ---    Influenza Vaccine 08/01/2017 11/18/2014    Eye Exam 09/20/2017 9/20/2016 (Done)    Override on 9/20/2016: Done    Override on 9/20/2016: Done (Dr Hammond/No diabetic retinopathy)    Hemoglobin A1c 02/23/2018 8/23/2017    Foot Exam 04/05/2018 4/5/2017 (Done)    Override on 4/5/2017: Done (Per Dr Rojas)    Lipid Panel 08/23/2018 8/23/2017    Urine Microalbumin 08/23/2018 8/23/2017    TETANUS VACCINE 03/18/2025 3/18/2015        Joseph was seen today for follow-up and diabetes.    Diagnoses and all orders for this visit:    Lumbar disc disease in a pt with Stage IV CRI  -     Increase oxycodone (OXYCONTIN)to 30 mg TR12 12 hr tablet; Take 1 tablet (30 mg total) by mouth every 12 (twelve) hours. 1 tab Q12 hours as  needed for Lumbar/Back pain    Controlled type 2 diabetes mellitus with stage 4 chronic kidney disease, with long-term current use of insulin       -      Continue Lantus 50units QD and Januvia 50mg QD   -     Hemoglobin A1c; Future  -     Comprehensive metabolic panel; Future  -     CBC auto differential; Future  -     Lipid panel; Future    Mixed hyperlipidemia/controlled       -     Continue Vytorin 10./20mg QD    Persistent atrial fibrillation/rate controlled      -     Continue Eliquis 2.5mg BID    CRI (chronic renal insufficiency), stage 4 (severe)/      -     Recommend Nephrology F/U Appt/Pt refuses      -     No NSAID use    Abnormal CXR/CT [facility]      -        F/U as per Pulmonary/Dr Ho with prior CT/PFTs    Health Maintenance      RTC x 3-4 months with 1 week prior fasting lab

## 2017-11-06 ENCOUNTER — HOSPITAL ENCOUNTER (INPATIENT)
Facility: HOSPITAL | Age: 82
LOS: 1 days | Discharge: HOME HEALTH | DRG: 566 | End: 2017-11-08
Attending: EMERGENCY MEDICINE | Admitting: INTERNAL MEDICINE
Payer: MEDICARE

## 2017-11-06 DIAGNOSIS — T79.6XXA TRAUMATIC RHABDOMYOLYSIS, INITIAL ENCOUNTER: ICD-10-CM

## 2017-11-06 DIAGNOSIS — N19 RENAL FAILURE, UNSPECIFIED CHRONICITY: ICD-10-CM

## 2017-11-06 DIAGNOSIS — N18.30 TYPE 2 DIABETES MELLITUS WITH STAGE 3 CHRONIC KIDNEY DISEASE, WITH LONG-TERM CURRENT USE OF INSULIN: ICD-10-CM

## 2017-11-06 DIAGNOSIS — Z79.4 TYPE 2 DIABETES MELLITUS WITH STAGE 3 CHRONIC KIDNEY DISEASE, WITH LONG-TERM CURRENT USE OF INSULIN: ICD-10-CM

## 2017-11-06 DIAGNOSIS — R53.1 WEAKNESS: ICD-10-CM

## 2017-11-06 DIAGNOSIS — E11.22 TYPE 2 DIABETES MELLITUS WITH STAGE 3 CHRONIC KIDNEY DISEASE, WITH LONG-TERM CURRENT USE OF INSULIN: ICD-10-CM

## 2017-11-06 DIAGNOSIS — R26.81 UNSTEADY GAIT: ICD-10-CM

## 2017-11-06 DIAGNOSIS — I25.9 CARDIAC ISCHEMIA: ICD-10-CM
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
## 2017-11-06 DIAGNOSIS — I48.20 CHRONIC ATRIAL FIBRILLATION: Primary | ICD-10-CM

## 2017-11-06 LAB
ALBUMIN SERPL BCP-MCNC: 2.9 G/DL
ALP SERPL-CCNC: 101 U/L
ALT SERPL W/O P-5'-P-CCNC: 31 U/L
ANION GAP SERPL CALC-SCNC: 10 MMOL/L
AST SERPL-CCNC: 27 U/L
BASOPHILS # BLD AUTO: 0.02 K/UL
BASOPHILS NFR BLD: 0.2 %
BILIRUB SERPL-MCNC: 1.8 MG/DL
BUN SERPL-MCNC: 62 MG/DL
CALCIUM SERPL-MCNC: 8.9 MG/DL
CHLORIDE SERPL-SCNC: 115 MMOL/L
CK SERPL-CCNC: 797 U/L
CO2 SERPL-SCNC: 20 MMOL/L
CREAT SERPL-MCNC: 1.8 MG/DL
DIFFERENTIAL METHOD: ABNORMAL
EOSINOPHIL # BLD AUTO: 0 K/UL
EOSINOPHIL NFR BLD: 0 %
ERYTHROCYTE [DISTWIDTH] IN BLOOD BY AUTOMATED COUNT: 13.2 %
EST. GFR  (AFRICAN AMERICAN): 39.9 ML/MIN/1.73 M^2
EST. GFR  (NON AFRICAN AMERICAN): 34.5 ML/MIN/1.73 M^2
GLUCOSE SERPL-MCNC: 114 MG/DL
HCT VFR BLD AUTO: 32.1 %
HGB BLD-MCNC: 9.8 G/DL
IMM GRANULOCYTES # BLD AUTO: 0.03 K/UL
IMM GRANULOCYTES NFR BLD AUTO: 0.3 %
INR PPP: 1.2
LIPASE SERPL-CCNC: <3 U/L
LYMPHOCYTES # BLD AUTO: 1.1 K/UL
LYMPHOCYTES NFR BLD: 11.8 %
MCH RBC QN AUTO: 27.6 PG
MCHC RBC AUTO-ENTMCNC: 30.5 G/DL
MCV RBC AUTO: 90 FL
MONOCYTES # BLD AUTO: 0.8 K/UL
MONOCYTES NFR BLD: 9.1 %
NEUTROPHILS # BLD AUTO: 7 K/UL
NEUTROPHILS NFR BLD: 78.6 %
NRBC BLD-RTO: 0 /100 WBC
PLATELET # BLD AUTO: 169 K/UL
PMV BLD AUTO: 11.2 FL
POCT GLUCOSE: 109 MG/DL (ref 70–110)
POTASSIUM SERPL-SCNC: 5.3 MMOL/L
PROT SERPL-MCNC: 6.5 G/DL
PROTHROMBIN TIME: 12.6 SEC
RBC # BLD AUTO: 3.55 M/UL
SODIUM SERPL-SCNC: 145 MMOL/L
TROPONIN I SERPL DL<=0.01 NG/ML-MCNC: 0.03 NG/ML
TROPONIN I SERPL DL<=0.01 NG/ML-MCNC: 0.03 NG/ML
WBC # BLD AUTO: 8.88 K/UL

## 2017-11-06 PROCEDURE — 93005 ELECTROCARDIOGRAM TRACING: CPT

## 2017-11-06 PROCEDURE — 96360 HYDRATION IV INFUSION INIT: CPT

## 2017-11-06 PROCEDURE — 25000003 PHARM REV CODE 250: Performed by: EMERGENCY MEDICINE

## 2017-11-06 PROCEDURE — 83690 ASSAY OF LIPASE: CPT

## 2017-11-06 PROCEDURE — 94761 N-INVAS EAR/PLS OXIMETRY MLT: CPT

## 2017-11-06 PROCEDURE — 84484 ASSAY OF TROPONIN QUANT: CPT

## 2017-11-06 PROCEDURE — 85610 PROTHROMBIN TIME: CPT

## 2017-11-06 PROCEDURE — 82550 ASSAY OF CK (CPK): CPT

## 2017-11-06 PROCEDURE — 82962 GLUCOSE BLOOD TEST: CPT

## 2017-11-06 PROCEDURE — 80053 COMPREHEN METABOLIC PANEL: CPT

## 2017-11-06 PROCEDURE — 93010 ELECTROCARDIOGRAM REPORT: CPT | Mod: ,,, | Performed by: INTERNAL MEDICINE

## 2017-11-06 PROCEDURE — 85025 COMPLETE CBC W/AUTO DIFF WBC: CPT

## 2017-11-06 PROCEDURE — 99285 EMERGENCY DEPT VISIT HI MDM: CPT | Mod: 25

## 2017-11-06 RX ADMIN — SODIUM CHLORIDE 1000 ML: 0.9 INJECTION, SOLUTION INTRAVENOUS at 10:11

## 2017-11-07 PROBLEM — N18.30 STAGE 3 CHRONIC KIDNEY DISEASE: Status: ACTIVE | Noted: 2017-11-07

## 2017-11-07 PROBLEM — R65.20 SEVERE SEPSIS: Status: RESOLVED | Noted: 2017-06-19 | Resolved: 2017-11-07

## 2017-11-07 PROBLEM — R53.81 DEBILITY: Status: ACTIVE | Noted: 2017-11-07

## 2017-11-07 PROBLEM — T79.6XXA TRAUMATIC RHABDOMYOLYSIS: Status: ACTIVE | Noted: 2017-11-07

## 2017-11-07 PROBLEM — N18.4 CKD (CHRONIC KIDNEY DISEASE), STAGE IV: Status: ACTIVE | Noted: 2017-11-07

## 2017-11-07 PROBLEM — J18.9 PNEUMONIA, COMMUNITY ACQUIRED: Status: RESOLVED | Noted: 2017-06-18 | Resolved: 2017-11-07

## 2017-11-07 PROBLEM — R53.1 WEAKNESS: Status: ACTIVE | Noted: 2017-11-07

## 2017-11-07 PROBLEM — N17.9 AKI (ACUTE KIDNEY INJURY): Status: RESOLVED | Noted: 2017-06-19 | Resolved: 2017-11-07

## 2017-11-07 PROBLEM — G92.9 ENCEPHALOPATHY, TOXIC: Status: RESOLVED | Noted: 2017-06-19 | Resolved: 2017-11-07

## 2017-11-07 PROBLEM — Z79.01 CHRONIC ANTICOAGULATION: Chronic | Status: ACTIVE | Noted: 2017-11-07

## 2017-11-07 PROBLEM — A41.9 SEVERE SEPSIS: Status: RESOLVED | Noted: 2017-06-19 | Resolved: 2017-11-07

## 2017-11-07 LAB
ALBUMIN SERPL BCP-MCNC: 2.7 G/DL
ALP SERPL-CCNC: 94 U/L
ALT SERPL W/O P-5'-P-CCNC: 26 U/L
ANION GAP SERPL CALC-SCNC: 15 MMOL/L
AST SERPL-CCNC: 22 U/L
BACTERIA #/AREA URNS AUTO: ABNORMAL /HPF
BASOPHILS # BLD AUTO: 0.03 K/UL
BASOPHILS NFR BLD: 0.3 %
BILIRUB SERPL-MCNC: 1.6 MG/DL
BILIRUB UR QL STRIP: NEGATIVE
BUN SERPL-MCNC: 57 MG/DL
CALCIUM SERPL-MCNC: 8.7 MG/DL
CHLORIDE SERPL-SCNC: 118 MMOL/L
CK SERPL-CCNC: 675 U/L
CLARITY UR REFRACT.AUTO: CLEAR
CO2 SERPL-SCNC: 13 MMOL/L
COLOR UR AUTO: YELLOW
CREAT SERPL-MCNC: 1.5 MG/DL
DIFFERENTIAL METHOD: ABNORMAL
EOSINOPHIL # BLD AUTO: 0 K/UL
EOSINOPHIL NFR BLD: 0.1 %
ERYTHROCYTE [DISTWIDTH] IN BLOOD BY AUTOMATED COUNT: 13.2 %
EST. GFR  (AFRICAN AMERICAN): 49.8 ML/MIN/1.73 M^2
EST. GFR  (NON AFRICAN AMERICAN): 43 ML/MIN/1.73 M^2
GLUCOSE SERPL-MCNC: 122 MG/DL
GLUCOSE UR QL STRIP: NEGATIVE
HCT VFR BLD AUTO: 32.9 %
HGB BLD-MCNC: 9.7 G/DL
HGB UR QL STRIP: ABNORMAL
HYALINE CASTS UR QL AUTO: 0 /LPF
IMM GRANULOCYTES # BLD AUTO: 0.04 K/UL
IMM GRANULOCYTES NFR BLD AUTO: 0.4 %
KETONES UR QL STRIP: ABNORMAL
LDH SERPL L TO P-CCNC: 282 U/L
LEUKOCYTE ESTERASE UR QL STRIP: NEGATIVE
LYMPHOCYTES # BLD AUTO: 1.3 K/UL
LYMPHOCYTES NFR BLD: 13.7 %
MAGNESIUM SERPL-MCNC: 2.3 MG/DL
MCH RBC QN AUTO: 28 PG
MCHC RBC AUTO-ENTMCNC: 29.5 G/DL
MCV RBC AUTO: 95 FL
MICROSCOPIC COMMENT: ABNORMAL
MONOCYTES # BLD AUTO: 0.9 K/UL
MONOCYTES NFR BLD: 9.6 %
NEUTROPHILS # BLD AUTO: 7 K/UL
NEUTROPHILS NFR BLD: 75.9 %
NITRITE UR QL STRIP: NEGATIVE
NRBC BLD-RTO: 0 /100 WBC
PH UR STRIP: 5 [PH] (ref 5–8)
PHOSPHATE SERPL-MCNC: 3.7 MG/DL
PLATELET # BLD AUTO: 157 K/UL
PMV BLD AUTO: 11.4 FL
POCT GLUCOSE: 171 MG/DL (ref 70–110)
POCT GLUCOSE: 224 MG/DL (ref 70–110)
POCT GLUCOSE: 237 MG/DL (ref 70–110)
POCT GLUCOSE: 291 MG/DL (ref 70–110)
POTASSIUM SERPL-SCNC: 5.3 MMOL/L
PROT SERPL-MCNC: 6.2 G/DL
PROT UR QL STRIP: ABNORMAL
RBC # BLD AUTO: 3.47 M/UL
RBC #/AREA URNS AUTO: 6 /HPF (ref 0–4)
SODIUM SERPL-SCNC: 146 MMOL/L
SP GR UR STRIP: 1.01 (ref 1–1.03)
SQUAMOUS #/AREA URNS AUTO: 1 /HPF
URN SPEC COLLECT METH UR: ABNORMAL
UROBILINOGEN UR STRIP-ACNC: NEGATIVE EU/DL
WBC # BLD AUTO: 9.19 K/UL
WBC #/AREA URNS AUTO: 3 /HPF (ref 0–5)

## 2017-11-07 PROCEDURE — 84100 ASSAY OF PHOSPHORUS: CPT

## 2017-11-07 PROCEDURE — 97530 THERAPEUTIC ACTIVITIES: CPT

## 2017-11-07 PROCEDURE — 85025 COMPLETE CBC W/AUTO DIFF WBC: CPT

## 2017-11-07 PROCEDURE — 99223 1ST HOSP IP/OBS HIGH 75: CPT | Mod: AI,GC,, | Performed by: HOSPITALIST

## 2017-11-07 PROCEDURE — G8979 MOBILITY GOAL STATUS: HCPCS | Mod: CK

## 2017-11-07 PROCEDURE — G8987 SELF CARE CURRENT STATUS: HCPCS | Mod: CK

## 2017-11-07 PROCEDURE — 11000001 HC ACUTE MED/SURG PRIVATE ROOM

## 2017-11-07 PROCEDURE — 25000003 PHARM REV CODE 250: Performed by: STUDENT IN AN ORGANIZED HEALTH CARE EDUCATION/TRAINING PROGRAM

## 2017-11-07 PROCEDURE — 83735 ASSAY OF MAGNESIUM: CPT

## 2017-11-07 PROCEDURE — 25000003 PHARM REV CODE 250: Performed by: EMERGENCY MEDICINE

## 2017-11-07 PROCEDURE — 97161 PT EVAL LOW COMPLEX 20 MIN: CPT

## 2017-11-07 PROCEDURE — 97165 OT EVAL LOW COMPLEX 30 MIN: CPT

## 2017-11-07 PROCEDURE — 94761 N-INVAS EAR/PLS OXIMETRY MLT: CPT

## 2017-11-07 PROCEDURE — G8978 MOBILITY CURRENT STATUS: HCPCS | Mod: CK

## 2017-11-07 PROCEDURE — 96361 HYDRATE IV INFUSION ADD-ON: CPT

## 2017-11-07 PROCEDURE — 82550 ASSAY OF CK (CPK): CPT

## 2017-11-07 PROCEDURE — 63600175 PHARM REV CODE 636 W HCPCS: Performed by: STUDENT IN AN ORGANIZED HEALTH CARE EDUCATION/TRAINING PROGRAM

## 2017-11-07 PROCEDURE — G8989 SELF CARE D/C STATUS: HCPCS | Mod: CK

## 2017-11-07 PROCEDURE — 25000003 PHARM REV CODE 250: Performed by: INTERNAL MEDICINE

## 2017-11-07 PROCEDURE — G8988 SELF CARE GOAL STATUS: HCPCS | Mod: CJ

## 2017-11-07 PROCEDURE — 81001 URINALYSIS AUTO W/SCOPE: CPT

## 2017-11-07 PROCEDURE — 97535 SELF CARE MNGMENT TRAINING: CPT

## 2017-11-07 PROCEDURE — G8980 MOBILITY D/C STATUS: HCPCS | Mod: CK

## 2017-11-07 PROCEDURE — 83615 LACTATE (LD) (LDH) ENZYME: CPT

## 2017-11-07 PROCEDURE — 80053 COMPREHEN METABOLIC PANEL: CPT

## 2017-11-07 RX ORDER — GLUCAGON 1 MG
1 KIT INJECTION
Status: DISCONTINUED | OUTPATIENT
Start: 2017-11-07 | End: 2017-11-08 | Stop reason: HOSPADM

## 2017-11-07 RX ORDER — ONDANSETRON 2 MG/ML
4 INJECTION INTRAMUSCULAR; INTRAVENOUS EVERY 12 HOURS PRN
Status: DISCONTINUED | OUTPATIENT
Start: 2017-11-07 | End: 2017-11-08 | Stop reason: HOSPADM

## 2017-11-07 RX ORDER — LANOLIN ALCOHOL/MO/W.PET/CERES
1000 CREAM (GRAM) TOPICAL DAILY
Status: DISCONTINUED | OUTPATIENT
Start: 2017-11-07 | End: 2017-11-07

## 2017-11-07 RX ORDER — PANTOPRAZOLE SODIUM 40 MG/1
40 TABLET, DELAYED RELEASE ORAL DAILY
Status: DISCONTINUED | OUTPATIENT
Start: 2017-11-07 | End: 2017-11-08 | Stop reason: HOSPADM

## 2017-11-07 RX ORDER — TAMSULOSIN HYDROCHLORIDE 0.4 MG/1
0.4 CAPSULE ORAL DAILY
Status: DISCONTINUED | OUTPATIENT
Start: 2017-11-07 | End: 2017-11-08 | Stop reason: HOSPADM

## 2017-11-07 RX ORDER — FUROSEMIDE 40 MG/1
40 TABLET ORAL DAILY
Status: DISCONTINUED | OUTPATIENT
Start: 2017-11-07 | End: 2017-11-07

## 2017-11-07 RX ORDER — ACETAMINOPHEN 325 MG/1
650 TABLET ORAL EVERY 4 HOURS PRN
Status: DISCONTINUED | OUTPATIENT
Start: 2017-11-07 | End: 2017-11-08 | Stop reason: HOSPADM

## 2017-11-07 RX ORDER — IBUPROFEN 200 MG
16 TABLET ORAL
Status: DISCONTINUED | OUTPATIENT
Start: 2017-11-07 | End: 2017-11-08 | Stop reason: HOSPADM

## 2017-11-07 RX ORDER — IBUPROFEN 200 MG
24 TABLET ORAL
Status: DISCONTINUED | OUTPATIENT
Start: 2017-11-07 | End: 2017-11-08 | Stop reason: HOSPADM

## 2017-11-07 RX ORDER — GABAPENTIN 300 MG/1
300 CAPSULE ORAL NIGHTLY
Status: DISCONTINUED | OUTPATIENT
Start: 2017-11-07 | End: 2017-11-07

## 2017-11-07 RX ORDER — SODIUM CHLORIDE 450 MG/100ML
INJECTION, SOLUTION INTRAVENOUS CONTINUOUS
Status: ACTIVE | OUTPATIENT
Start: 2017-11-07 | End: 2017-11-07

## 2017-11-07 RX ORDER — LOSARTAN POTASSIUM AND HYDROCHLOROTHIAZIDE 12.5; 1 MG/1; MG/1
1 TABLET ORAL DAILY
Status: ON HOLD | COMMUNITY
End: 2017-11-08 | Stop reason: ALTCHOICE

## 2017-11-07 RX ORDER — CARVEDILOL 6.25 MG/1
6.25 TABLET ORAL 2 TIMES DAILY
Status: DISCONTINUED | OUTPATIENT
Start: 2017-11-07 | End: 2017-11-08 | Stop reason: HOSPADM

## 2017-11-07 RX ORDER — AMOXICILLIN 250 MG
1 CAPSULE ORAL DAILY PRN
Status: DISCONTINUED | OUTPATIENT
Start: 2017-11-07 | End: 2017-11-08 | Stop reason: HOSPADM

## 2017-11-07 RX ORDER — SODIUM CHLORIDE 9 MG/ML
1000 INJECTION, SOLUTION INTRAVENOUS
Status: COMPLETED | OUTPATIENT
Start: 2017-11-07 | End: 2017-11-07

## 2017-11-07 RX ORDER — SODIUM CHLORIDE 9 MG/ML
1000 INJECTION, SOLUTION INTRAVENOUS CONTINUOUS
Status: DISCONTINUED | OUTPATIENT
Start: 2017-11-07 | End: 2017-11-07

## 2017-11-07 RX ORDER — INSULIN ASPART 100 [IU]/ML
0-5 INJECTION, SOLUTION INTRAVENOUS; SUBCUTANEOUS
Status: DISCONTINUED | OUTPATIENT
Start: 2017-11-07 | End: 2017-11-08 | Stop reason: HOSPADM

## 2017-11-07 RX ORDER — IRON POLYSACCHARIDE COMPLEX 150 MG
150 CAPSULE ORAL DAILY
Status: DISCONTINUED | OUTPATIENT
Start: 2017-11-07 | End: 2017-11-08 | Stop reason: HOSPADM

## 2017-11-07 RX ORDER — ASPIRIN 325 MG
325 TABLET ORAL
Status: COMPLETED | OUTPATIENT
Start: 2017-11-07 | End: 2017-11-07

## 2017-11-07 RX ADMIN — CARVEDILOL 6.25 MG: 6.25 TABLET, FILM COATED ORAL at 09:11

## 2017-11-07 RX ADMIN — TAMSULOSIN HYDROCHLORIDE 0.4 MG: 0.4 CAPSULE ORAL at 09:11

## 2017-11-07 RX ADMIN — SODIUM CHLORIDE 1000 ML: 0.9 INJECTION, SOLUTION INTRAVENOUS at 03:11

## 2017-11-07 RX ADMIN — SODIUM CHLORIDE 1000 ML: 0.9 INJECTION, SOLUTION INTRAVENOUS at 05:11

## 2017-11-07 RX ADMIN — INSULIN DETEMIR 10 UNITS: 100 INJECTION, SOLUTION SUBCUTANEOUS at 09:11

## 2017-11-07 RX ADMIN — INSULIN ASPART 1 UNITS: 100 INJECTION, SOLUTION INTRAVENOUS; SUBCUTANEOUS at 09:11

## 2017-11-07 RX ADMIN — APIXABAN 2.5 MG: 2.5 TABLET, FILM COATED ORAL at 09:11

## 2017-11-07 RX ADMIN — INSULIN ASPART 2 UNITS: 100 INJECTION, SOLUTION INTRAVENOUS; SUBCUTANEOUS at 05:11

## 2017-11-07 RX ADMIN — PANTOPRAZOLE SODIUM 40 MG: 40 TABLET, DELAYED RELEASE ORAL at 09:11

## 2017-11-07 RX ADMIN — Medication 150 MG: at 09:11

## 2017-11-07 RX ADMIN — SODIUM CHLORIDE: 0.45 INJECTION, SOLUTION INTRAVENOUS at 11:11

## 2017-11-07 RX ADMIN — ASPIRIN 325 MG ORAL TABLET 325 MG: 325 PILL ORAL at 02:11

## 2017-11-07 RX ADMIN — INSULIN ASPART 2 UNITS: 100 INJECTION, SOLUTION INTRAVENOUS; SUBCUTANEOUS at 12:11

## 2017-11-07 RX ADMIN — FUROSEMIDE 40 MG: 40 TABLET ORAL at 09:11

## 2017-11-07 NOTE — PLAN OF CARE
Problem: Patient Care Overview  Goal: Plan of Care Review  Outcome: Ongoing (interventions implemented as appropriate)  Pt remained free from falls/injuires. VSS. Afebrile. Monitored blood glucose and administered insulin per sliding scale. Skin intact. Continued IV fluids as ordered. Pt expressed concern regarding transportation if he had to stay the night. Pt is in bed with side rails up x 3, wheels locked, bed in lowest position, call light in reach.

## 2017-11-07 NOTE — PLAN OF CARE
Thompson Stiles MD  [address] / [facility] [address]      [id number] - [place], [place] - [address]  [address] [facility] [address]  Phone: [phone] Fax: [phone]    [facility] - [place], [place] - [address]  [address] [address]  Phone: [phone] Fax: [phone]    [facility] - [place], [place] - [address] [place] [address]  Phone: [phone] Fax: [phone]    [id number] - [place], [place] - [address] AT CORNER OF [address] [address]  Phone: [phone] Fax: [phone]      Payor: MEDICARE / Plan: MEDICARE PART A & B / Product Type: Government /        11/07/17 1055   Discharge Assessment   Assessment Type Discharge Planning Assessment   Confirmed/corrected address and phone number on facesheet? Yes   Assessment information obtained from? Patient   Expected Length of Stay (days) 1   Communicated expected length of stay with patient/caregiver yes   Prior to hospitilization cognitive status: Alert/Oriented   Prior to hospitalization functional status: Assistive Equipment   Current cognitive status: Alert/Oriented   Current Functional Status: Assistive Equipment   Facility Arrived From: (home/selfcare)   Lives With alone   Able to Return to Prior Arrangements yes   Is patient able to care for self after discharge? Unable to determine at this time (comments)   Who are your caregiver(s) and their phone number(s)? (Joseph Valerio Jr, son, [phone])   Patient's perception of discharge disposition home or selfcare   Readmission Within The Last 30 Days no previous admission in last 30 days   Patient currently being followed by outpatient case management? No   Patient currently receives any other outside agency services? No   Equipment Currently Used at Home CPAP;glucometer;walker, standard   Do  you have any problems affording any of your prescribed medications? No   Is the patient taking medications as prescribed? yes   Does the patient have transportation home? Yes   Transportation Available family or friend will provide   Does the patient receive services at the [facility]? No   Discharge Plan A Home   Discharge Plan B Home;Home Health   Patient/Family In Agreement With Plan yes

## 2017-11-07 NOTE — HPI
This is a 82 y.o. male with a PMHx of HTN, DM, HLD, CAD, degenerative disc disease,PVD, atrial fibrillation on apixaban, CARMELO and CKD who presents to the ED s/p fall yesterday.     Patient reports that he slipped from the chair couldn't get up , started crawling around the house to get to a phone but was unable to do so as all the phones were not working, he had no access to the kitchen to have water or food for 2 days. He lives alone. Patient denies chest pain prior to fall, striking his head or losing consciousness. Additionally denies neck pain, pain in upper extremities.     Per the patient's son, he was on the floor for a day and a half. The patient's son states that his diabetic neuropathy has been getting progressively worse over the last few weeks, and the patient has been complaining over increased pain and weakness in his lower extremities. He was too weak to pull himself up after he fell and he is too weak to walk at the current time. The patient reports that he occasionally uses a cane at home, and he also has a walker. The patient's son notes that the patient has new facial and periorbital swelling.

## 2017-11-07 NOTE — ED NOTES
Pt attempted to ambulate around room with assistance. Gait was unsteady. Pt couldn't ambulate independently without body leaning forward.

## 2017-11-07 NOTE — H&P
"[facility] Medicine  History & Physical    Patient Name: Joseph Valerio  MRN: [id number]  Admission Date: 11/6/2017  Attending Physician: Francisco Schafer MD   Primary Care Provider: Thompson Stiles MD    [facility] Medicine Team: [facility] HOSP MED 5 [facility] Keena Wong MD     Patient information was obtained from patient and ER records.     Subjective:     Principal Problem:Traumatic rhabdomyolysis    Chief Complaint:   Chief Complaint   Patient presents with    Fall     Pt reports falling out of his chair onto floor last night, found by family tonight.   Pt c/o "soreness".          HPI: This is a 82 y.o. male with a PMHx of HTN, DM, HLD, CAD, degenerative disc disease,PVD, atrial fibrillation on apixaban, CARMELO and CKD who presents to the ED s/p fall yesterday.     Patient reports that he slipped from the chair couldn't get up , started crawling around the house to get to a phone but was unable to do so as all the phones were not working, he had no access to the kitchen to have water or food for 2 days. He lives alone. Patient denies chest pain prior to fall, striking his head or losing consciousness. Additionally denies neck pain, pain in upper extremities.     Per the patient's son, he was on the floor for a day and a half. The patient's son states that his diabetic neuropathy has been getting progressively worse over the last few weeks, and the patient has been complaining over increased pain and weakness in his lower extremities. He was too weak to pull himself up after he fell and he is too weak to walk at the current time. The patient reports that he occasionally uses a cane at home, and he also has a walker. The patient's son notes that the patient has new facial and periorbital swelling.     Past Medical History:   Diagnosis Date    *Atrial fibrillation     Allergy     Aortic sclerosis     BPH (benign prostatic hypertrophy)     Calcium nephrolithiasis     CKD (chronic kidney " disease), stage III 10/16/2014    Colon polyps     Coronary artery disease     CPAP (continuous positive airway pressure) dependence     Degenerative disc disease     Diabetes mellitus     Hiatal hernia     Hyperlipidemia     Hyperprolactinemia 12/29/2014    Hypertension     Peripheral vascular disease     Proteinuria     Renal insufficiency     Renovascular hypertension 7/31/2012    Sleep apnea     Venous insufficiency        Past Surgical History:   Procedure Laterality Date    CATARACT EXTRACTION, BILATERAL      CHOLECYSTECTOMY      GASTRIC BYPASS         Review of patient's allergies indicates:  No Known Allergies    No current facility-administered medications on file prior to encounter.      Current Outpatient Prescriptions on File Prior to Encounter   Medication Sig    acetaminophen (TYLENOL ARTHRITIS PAIN) 650 MG TbSR Take 1 tablet (650 mg total) by mouth every 8 (eight) hours. 1 tab 2-3x/day for arthritis pain    apixaban 2.5 mg Tab Take 1 tablet (2.5 mg total) by mouth 2 (two) times daily.    azelastine-fluticasone 137-50 mcg/spray Spry nassal spray 1 spray by Each Nare route 2 (two) times daily.    blood sugar diagnostic Strp Check glucose 2-3x/day before meals and/or bedtime    cholestyramine (QUESTRAN) 4 gram packet 1 packet up to daily for Bowel Movements/Diarrhea prevention    COREG CR 20 mg 24 hr capsule     cyanocobalamin, vitamin B-12, 1,000 mcg TbSR     doxycycline (VIBRA-TABS) 100 MG tablet Take 1 tablet (100 mg total) by mouth 2 (two) times daily.    ezetimibe-simvastatin 10-20 mg (VYTORIN) 10-20 mg per tablet TAKE 1 TABLET EVERY EVENING AT BEDTIME FOR CHOLESTEROL    ferrous sulfate dried (SLOW FE) 160 mg (50 mg iron) TbSR Take 1 tablet (160 mg total) by mouth once daily.    furosemide (LASIX) 40 MG tablet Take 1 tablet (40 mg total) by mouth once daily.    gabapentin (NEURONTIN) 300 MG capsule TAKE 1 CAPSULE BY MOUTH AT BEDTIME FOR DIABETIC NEUROPATHY    insulin  glargine (LANTUS) 100 unit/mL injection Inject 50 Units into the skin once daily. 50 units Subcutaneous Every morning.  Pt needs 90 days of lantus-takes 50 units QHS    ipratropium (ATROVENT) 0.06 % nasal spray 2 sprays by Nasal route 2 (two) times daily.    lancets (ACCU-CHEK SOFTCLIX LANCETS) Misc 1 strip by Misc.(Non-Drug; Combo Route) route 2 (two) times daily.    lansoprazole (PREVACID SOLUTAB) 30 MG disintegrating tablet Take 1 tablet (30 mg total) by mouth once daily. 1 Tablet,Rapid Dissolve, DR Sublingual Every day.  for acid reflux    ondansetron (ZOFRAN) 8 MG tablet Take 1 tablet (8 mg total) by mouth every 8 (eight) hours as needed for Nausea. 1 tab Q8 hours as needed for nausea    oxycodone (OXYCONTIN) 30 mg TR12 12 hr tablet Take 1 tablet (30 mg total) by mouth every 12 (twelve) hours. 1 tab Q12 hours as needed for Lumbar/Back pain    peg-electrolyte soln (TRILYTE WITH FLAVOR PACKETS) 420 gram SolR Take 1 kit by mouth as directed.    SITagliptin (JANUVIA) 50 MG Tab Take 1 tablet (50 mg total) by mouth once daily. For Diabetes    tamsulosin (FLOMAX) 0.4 mg Cp24 Take 1 capsule (0.4 mg total) by mouth once daily. 1 Capsule, Sust. Release 24 hr Oral Every day.  for prostate    timolol maleate 0.5% (TIMOPTIC) 0.5 % Drop Place 1 drop into both eyes once daily.     TRAVATAN Z 0.004 % Drop Place 1 drop into both eyes every evening.      Family History     None        Social History Main Topics    Smoking status: Former Smoker     Packs/day: 4.00     Years: 30.00     Quit date: 1/9/1990    Smokeless tobacco: Never Used      Comment: pt was up to 4 packs a day before.    Alcohol use No    Drug use: No    Sexual activity: Yes     Partners: Female     Review of Systems   Constitutional: Negative for chills and fever.   HENT: Negative for congestion.    Eyes: Negative for visual disturbance.   Respiratory: Negative for cough and shortness of breath.    Cardiovascular: Negative for chest pain,  palpitations and leg swelling.   Gastrointestinal: Negative for abdominal distention, abdominal pain, diarrhea, nausea and vomiting.   Endocrine: Negative for polyphagia and polyuria.   Genitourinary: Negative for dysuria and hematuria.   Musculoskeletal: Negative for back pain.   Neurological: Negative for dizziness and light-headedness.   Psychiatric/Behavioral: Negative for agitation and behavioral problems.     Objective:     Vital Signs (Most Recent):  Temp: 97.8 °F (36.6 °C) (11/06/17 1823)  Pulse: 71 (11/07/17 0331)  Resp: 18 (11/07/17 0331)  BP: (!) 176/81 (11/07/17 0226)  SpO2: 98 % (11/07/17 0331) Vital Signs (24h Range):  Temp:  [97.8 °F (36.6 °C)] 97.8 °F (36.6 °C)  Pulse:  [69-78] 71  Resp:  [14-21] 18  SpO2:  [94 %-100 %] 98 %  BP: (157-190)/(78-81) 176/81     Weight: 103 kg (227 lb)  Body mass index is 31.66 kg/m².    Physical Exam   Constitutional: He is oriented to person, place, and time. He appears well-developed and well-nourished. No distress.   HENT:   Head: Normocephalic and atraumatic.   Eyes: Pupils are equal, round, and reactive to light.   Neck: Normal range of motion. Neck supple.   Cardiovascular: Normal rate and normal heart sounds.    Pulmonary/Chest: Effort normal and breath sounds normal.   Abdominal: Soft. Bowel sounds are normal.   Musculoskeletal: Normal range of motion. He exhibits edema. He exhibits no tenderness.   Chronic venous stasis type changes with violaceous discoloration of pre-tibial locations/no edema    Neurological: He is alert and oriented to person, place, and time. No sensory deficit. He exhibits normal muscle tone.   Skin: Skin is warm and dry. He is not diaphoretic.        Significant Labs:   CBC:   Recent Labs  Lab 11/06/17 2234   WBC 8.88   HGB 9.8*   HCT 32.1*        CMP:   Recent Labs  Lab 11/06/17 2234      K 5.3*   *   CO2 20*   *   BUN 62*   CREATININE 1.8*   CALCIUM 8.9   PROT 6.5   ALBUMIN 2.9*   BILITOT 1.8*   ALKPHOS 101    AST 27   ALT 31   ANIONGAP 10   EGFRNONAA 34.5*     Urine Studies:   Recent Labs  Lab 11/07/17  0031   COLORU Yellow   APPEARANCEUA Clear   PHUR 5.0   SPECGRAV 1.015   PROTEINUA 2+*   GLUCUA Negative   KETONESU 1+*   BILIRUBINUA Negative   OCCULTUA 2+*   NITRITE Negative   UROBILINOGEN Negative   LEUKOCYTESUR Negative   RBCUA 6*   WBCUA 3   BACTERIA Occasional   SQUAMEPITHEL 1   HYALINECASTS 0       Significant Imaging: I have reviewed and interpreted all pertinent imaging results/findings within the past 24 hours.    Assessment/Plan:     * Traumatic rhabdomyolysis    - due to the fall and crawling for more than 24 hrs  - CPK >700  - IVF   - daily CPK        Weakness    - dehydration and poor intake  - PT/OT                Atrial fibrillation    - Last echo 7/17: Normal left ventricular systolic function (EF 60-65%),Concentric remodeling,Wall motion abnormalities, Indeterminate LV diastolic function,Biatrial enlargement.   - Continue coreg and apixban.          Essential hypertension    - PT was previously on losartan/HCZT, stopped by PCP on due to elevated crt. 7/17  - hold Lasix 40 mg          Stage 3 chronic kidney disease    - stable cret at baseline          Venous stasis of lower extremity    - no active changes.  - need compressive socks upon discharge.          CARMELO (obstructive sleep apnea)    - not on CPAP.          Benign prostatic hyperplasia    - Continue home flumax          Hyperlipidemia    - Hold statin due to high CPK          Long-term insulin use in type 2 diabetes    - On insulin Lantus 50 U daily.  - POCT QID  - will start detemir 10 U and low does ISS, due to pt dehydration and poor oral intake.            VTE Risk Mitigation         Ordered     apixaban tablet 2.5 mg  2 times daily     Route:  Oral        11/07/17 0340     Low Risk of VTE  Once      11/07/17 0340             Iris Wong MD  Department of Hospital Medicine   [facility]

## 2017-11-07 NOTE — ED TRIAGE NOTES
Pt family member reports pt fell earlier today. Pt family states pt has been on the floor for about a day now. Family reports pt has been diagnosed with atrophy and muscles weakness in bilateral lower extremities. Family reports pt has been feeling weak and in pain for the past couple of weeks. Pt family reports pt has fallen about seven times in the past year.

## 2017-11-07 NOTE — PT/OT/SLP EVAL
Physical Therapy  Evaluation    Joseph Valerio   MRN: [id number]   Admitting Diagnosis: Traumatic rhabdomyolysis    PT Received On: 11/07/17  PT Start Time: 0914     PT Stop Time: 0944    PT Total Time (min): 30 min       Billable Minutes:  Evaluation 22 and Therapeutic Activity 8    Diagnosis: Traumatic rhabdomyolysis  Recent fall PTA    Past Medical History:   Diagnosis Date    *Atrial fibrillation     Allergy     Aortic sclerosis     BPH (benign prostatic hypertrophy)     CAD (coronary artery disease) 7/31/2012    Calcium nephrolithiasis     Chronic anticoagulation - apixaban  11/7/2017    For AFib    Chronic atrial fibrillation 10/16/2014    CKD (chronic kidney disease), stage III 10/16/2014    Colon polyps     Coronary artery disease     CPAP (continuous positive airway pressure) dependence     Degenerative disc disease     Diabetes mellitus     Encephalopathy, toxic 6/19/2017    Erectile dysfunction 8/20/2012    Hiatal hernia     HLD (hyperlipidemia) 7/31/2012    Hyperlipidemia     Hyperprolactinemia 12/29/2014    Hypertension     Increased prolactin level 9/17/2012    39.9 (3-15 range)     Lumbar disc disease 7/31/2012    Male hypogonadism 9/12/2012    Obesity 7/31/2012    S/p Gastric Bypass Surgery     Peripheral vascular disease     Pituitary microadenoma 2/26/2013    Pneumonia, community acquired 6/18/2017    Proteinuria     Renal insufficiency     Renovascular hypertension 7/31/2012    Severe sepsis 6/19/2017    Sleep apnea     Type 2 diabetes mellitus with stage 3 chronic kidney disease, with long-term current use of insulin 7/31/2012    Venous insufficiency       Past Surgical History:   Procedure Laterality Date    CATARACT EXTRACTION, BILATERAL      CHOLECYSTECTOMY      GASTRIC BYPASS         Referring physician: [place]  Date referred to PT: 11/7/17    General Precautions: Standard, fall, [place]  Orthopedic Precautions: N/A   Braces: N/A       Do you have any cultural,  spiritual, Restorationism conflicts, given your current situation?: none stated    Patient History:  Lives With: alone  Living Arrangements: house  Home Accessibility:  (no concerns)  Transportation Available: family or friend will provide  Living Environment Comment: Patient lives alone in 1-story home with no DENISE. Walk-in shower without seat. Indep with ADLs PTA and ambulates with rolling walker. Family able to assist upon discharge; however, unable to provide 24-hr care.  Equipment Currently Used at Home: CPAP, walker, rolling, glucometer  DME owned (not currently used): none    Previous Level of Function:  Ambulation Skills: needs device  Transfer Skills: needs device  ADL Skills: independent    Subjective:  Communicated with RN prior to session.  Patient agreeable to PT session.   Chief Complaint: none stated  Patient goals: return home    Pain/Comfort  Pain Rating 1: 0/10  Pain Rating Post-Intervention 1: 0/10      Objective:   Patient found with: telemetry, peripheral IV     Cognitive Exam:  Oriented to: Person, Place, Time and Situation    Follows Commands/attention: Follows multistep  commands  Communication: clear/fluent; "[employer]  Safety awareness/insight to disability: impaired    Physical Exam:  Postural examination/scapula alignment: Head forward    Skin integrity: Visible skin intact  Edema: None noted     Sensation:   Intact to light touch    Lower Extremity Range of Motion:  Right Lower Extremity: WFL  Left Lower Extremity: WFL    Lower Extremity Strength:  Right Lower Extremity: WFL  Left Lower Extremity: WFL    Gross motor coordination: impaired secondary to decreased safety awareness during ambulation    Functional Mobility:  Bed Mobility:  Rolling/Turning Right: Minimum assistance  Scooting/Bridging: Minimum Assistance (seated EOB)  Supine to Sit: Minimum Assistance  Sit to Supine: Minimum Assistance    Transfers:  Sit <> Stand Assistance: Contact Guard Assistance  Sit <> Stand Assistive Device: Rolling  Walker  Bed <> Chair Technique: Stand Pivot  Bed <> Chair Assistance: Contact Guard Assistance  Bed <> Chair Assistive Device: Rolling Walker    Gait:   Gait Distance: 12ftx2 to and from bathroom  Assistance 1: Contact Guard Assistance, Stand by Assistance  Gait Assistive Device: Rolling walker  Gait Pattern: swing-through gait  Gait Deviation(s): increased time in double stance, decreased velocity of limb motion, decreased step length, decreased stride length, forward lean, decreased weight-shifting ability, foot flat, decreased toe-to-floor clearance    Balance:   Static Sit: NORMAL: No deviations seen in posture held statically  Dynamic Sit: NORMAL: No deviations seen in posture held dynamically  Static Stand: POOR+: Needs MINIMAL assist to maintain  Dynamic stand: FAIR: Needs CONTACT GUARD during gait    Therapeutic Activities and Exercises:  OT present for co-evaluation.    Patient educated on:   - role of PT/POC   - importance of participation with therapy services   - safety with transfers including hand placement using rolling walker   - safety with gait including upright posture and decreased bhavani using rolling walker   - timed voidance using restroom to increase safety     Bed mobility with Min Assist.  Sit<>Stand with Min Assist.  Rushed ambulation to bathroom secondary to functional incontinence.  Gait 10ftx1 CGA using rolling walker.  Stand pivot to toilet CGA using rolling walker. Min Assist provided for lowering to low toilet.    OT assisted patient with pericare as needs.    Gait 10ftx1 SBA using rolling walker.  Improved safety with ambulation once patient used restroom.  Stand pivot to EOB sitting with SBA using rolling walker.  Sit<>supine with Min Assist for LE management.    Safest to transfer and ambulate with assist of 1 using rolling walker at this time.    AM-PAC 6 CLICK MOBILITY  How much help from another person does this patient currently need?   1 = Unable, Total/Dependent  Assistance  2 = A lot, Maximum/Moderate Assistance  3 = A little, Minimum/Contact Guard/Supervision  4 = None, Modified Cammal/Independent    Turning over in bed (including adjusting bedclothes, sheets and blankets)?: 3  Sitting down on and standing up from a chair with arms (e.g., wheelchair, bedside commode, etc.): 3  Moving from lying on back to sitting on the side of the bed?: 3  Moving to and from a bed to a chair (including a wheelchair)?: 3  Need to walk in hospital room?: 3  Climbing 3-5 steps with a railing?: 2  Total Score: 17     AM-PAC Raw Score CMS G-Code Modifier Level of Impairment Assistance   6 % Total / Unable   7 - 9 CM 80 - 100% Maximal Assist   10 - 14 CL 60 - 80% Moderate Assist   15 - 19 CK 40 - 60% Moderate Assist   20 - 22 CJ 20 - 40% Minimal Assist   23 CI 1-20% SBA / CGA   24 CH 0% Independent/ Mod I     Patient left supine with all lines intact, call button in reach and RN notified.    Assessment:   Joseph Valerio is a 82 y.o. male with a medical diagnosis of Traumatic rhabdomyolysis and presents with rehab identified impairments listed below. Bed mobility with Min Assist.  Sit<>stand CGA using rolling walker. Gait 10ftx2 with CGA/SBA using rolling walker. Rushed ambulation during 1st attempt secondary to patient needing to use restroom. Second attempt back to bed with SBA for safety using rolling walker. Increased safety with ambulation once patient able to use bathroom. Safest to ambulate using rolling walker with assist of 1 at this time. To benefit from continued skilled intervention to address deficits prior to transition to SS-SNF to improve safety and overall functional mobility.    History: personal factors and/or comorbidities that impact the plan of care: traumatic rhabdomylosis; fall risk; recent fall; decreased caregiver support; functional incontinence  Evaluation of Body Systems: cardiovascular/pulmonary, integumentary, musculoskeletal, neuromuscular,  cognition/communication  Functional Outcome Tools: AMPAC, ROM, MMT  Clinical Presentation: stable  Evaluation Complexity Level: Low    Rehab identified problem list/impairments: Rehab identified problem list/impairments: weakness, impaired endurance, impaired self care skills, impaired functional mobilty, gait instability, impaired balance, decreased lower extremity function, decreased safety awareness    Rehab potential is good.    Activity tolerance: Fair    Discharge recommendations: Discharge Facility/Level Of Care Needs: nursing facility, skilled (SS-SNF vs HHPT)     Barriers to discharge: Barriers to Discharge: Decreased caregiver support (lives alone)    Equipment recommendations: Equipment Needed After Discharge: shower chair     GOALS:    Physical Therapy Goals        Problem: Physical Therapy Goal    Goal Priority Disciplines Outcome Goal Variances Interventions   Physical Therapy Goal     PT/OT, PT Ongoing (interventions implemented as appropriate)     Description:  Goals to be met by: 2017     Patient will increase functional independence with mobility by performin. Supine to sit with Modified [place]  2. Sit to supine with Modified [place]  3. Sit to stand transfer with Contact Guard Assistance  4. Bed to chair transfer with Contact Guard Assistance using Rolling Walker  5. Gait  x 150 feet with Contact Guard Assistance using Rolling Walker.                       PLAN:    Patient to be seen 4 x/week to address the above listed problems via gait training, therapeutic activities, therapeutic exercises, neuromuscular re-education  Plan of Care expires: 17  Plan of Care reviewed with: patient          Олег Isidro III, PT  2017

## 2017-11-07 NOTE — ED PROVIDER NOTES
"Encounter Date: 11/6/2017    SCRIBE #1 NOTE: I, Shanelle Ross, am scribing for, and in the presence of, Dr. Sagastume.       History     Chief Complaint   Patient presents with    Fall     Pt reports falling out of his chair onto floor last night, found by family tonight.   Pt c/o "soreness".       Time patient was seen by the provider: 10:58 PM      This is a 82 y.o. male with a PMHx of HTN, DM, HLD, aortic sclerosis, proteinuria, CAD, degenerative disc disease, renal insufficiency, peripheral vascular disease, calcium nephrolithiasis, atrial fibrillation, venous insufficiency, CPAP and CKD who presents to the ED s/p fall yesterday. Patient reports that he leaned forward on a lawn chair yesterday, hit the floor and could not get up. The patient lives alone. Per the patient's son, he was on the floor for a day and a half. The patient's son states that his diabetic neuropathy has been getting progressively worse over the last few weeks, and the patient has been complaining over increased pain and weakness in his lower extremities. He was too weak to pull himself up after he fell and he is too weak to walk at the current time. The patient reports that he occasionally uses a cane at home, and he also has a walker. The son reports that it took 3 people, including EMS personnel to help the patient up this evening. Patient endorses low back pain at the current time. Also c/o of abrasion on medial aspect of right knee from crawling on the carpet. Patient denies chest pain prior to fall, striking his head or losing consciousness. Additionally denies neck pain, pain in upper extremities. The patient's son notes that the patient has new facial and periorbital swelling.       The history is provided by the patient, medical records and a relative.     Review of patient's allergies indicates:  No Known Allergies  Past Medical History:   Diagnosis Date    *Atrial fibrillation     Allergy     Aortic sclerosis     BPH (benign " prostatic hypertrophy)     Calcium nephrolithiasis     CKD (chronic kidney disease), stage III 10/16/2014    Colon polyps     Coronary artery disease     CPAP (continuous positive airway pressure) dependence     Degenerative disc disease     Diabetes mellitus     Hiatal hernia     Hyperlipidemia     Hyperprolactinemia 12/29/2014    Hypertension     Peripheral vascular disease     Proteinuria     Renal insufficiency     Renovascular hypertension 7/31/2012    Sleep apnea     Venous insufficiency      Past Surgical History:   Procedure Laterality Date    CATARACT EXTRACTION, BILATERAL      CHOLECYSTECTOMY      GASTRIC BYPASS       History reviewed. No pertinent family history.  Social History   Substance Use Topics    Smoking status: Former Smoker     Packs/day: 4.00     Years: 30.00     Quit date: 1/9/1990    Smokeless tobacco: Never Used      Comment: pt was up to 4 packs a day before.    Alcohol use No     Review of Systems   Constitutional: Negative for chills and fever.   HENT: Positive for facial swelling. Negative for rhinorrhea and sore throat.    Respiratory: Negative for cough and shortness of breath.    Cardiovascular: Negative for chest pain and leg swelling.   Gastrointestinal: Negative for abdominal pain, constipation, diarrhea, nausea and vomiting.   Genitourinary: Negative for dysuria.   Musculoskeletal: Positive for back pain. Negative for neck pain.        Negative for pain in upper extremities.   Skin: Positive for wound (abrasion).   Neurological: Positive for weakness (worsening diabetic neuropathy).   All other systems reviewed and are negative.      Physical Exam     Initial Vitals [11/06/17 1823]   BP Pulse Resp Temp SpO2   (!) 157/80 76 20 97.8 °F (36.6 °C) (!) 94 %      MAP       105.67         Physical Exam    Nursing note and vitals reviewed.  Constitutional:   Conversational and interactive.   HENT:   Mouth/Throat: Oropharynx is clear and moist.   Eyes: EOM are  normal. Pupils are equal, round, and reactive to light.   Cardiovascular: Intact distal pulses.   See documented heart rate and blood pressure.   Pulmonary/Chest: Breath sounds normal. No respiratory distress. He has no wheezes. He has no rhonchi.   Abdominal: Soft. There is no tenderness. There is no rebound and no guarding.   Musculoskeletal: Normal range of motion. He exhibits tenderness (paralumbar). He exhibits no edema.   Neurological:   No obvious focal deficits.   Skin: Skin is warm. There is erythema.   Bilateral lower extremities erythema, reported to be baseline. Erythema bilateral chest. Erythema on right arm.          ED Course   Procedures  Labs Reviewed   URINALYSIS, REFLEX TO URINE CULTURE - Abnormal; Notable for the following:        Result Value    Protein, UA 2+ (*)     Ketones, UA 1+ (*)     Occult Blood UA 2+ (*)     All other components within normal limits    Narrative:     Preferred Collection Type->Urine, Clean Catch   PROTIME-INR - Abnormal; Notable for the following:     Prothrombin Time 12.6 (*)     All other components within normal limits   CBC W/ AUTO DIFFERENTIAL - Abnormal; Notable for the following:     RBC 3.55 (*)     Hemoglobin 9.8 (*)     Hematocrit 32.1 (*)     MCHC 30.5 (*)     Gran% 78.6 (*)     Lymph% 11.8 (*)     All other components within normal limits   COMPREHENSIVE METABOLIC PANEL - Abnormal; Notable for the following:     Potassium 5.3 (*)     Chloride 115 (*)     CO2 20 (*)     Glucose 114 (*)     BUN, Bld 62 (*)     Creatinine 1.8 (*)     Albumin 2.9 (*)     Total Bilirubin 1.8 (*)     eGFR if  39.9 (*)     eGFR if non  34.5 (*)     All other components within normal limits   LIPASE - Abnormal; Notable for the following:     Lipase <3 (*)     All other components within normal limits   CK - Abnormal; Notable for the following:      (*)     All other components within normal limits   URINALYSIS MICROSCOPIC - Abnormal; Notable  for the following:     RBC, UA 6 (*)     All other components within normal limits    Narrative:     Preferred Collection Type->Urine, Clean Catch   TROPONIN I   TROPONIN I   URINALYSIS   POCT GLUCOSE     EKG Readings: (Independently Interpreted)   Rhythm: Atrial Fibrillation. Heart Rate: 71. Axis: Normal.   Low voltage QRS. Nonspecific changes.   22:27:02     CT Head Without Contrast   Final Result       No evidence of major vascular distribution infarct or hemorrhage. No calvarial fractures.      Involutional and chronic microvascular ischemic changes.   ______________________________________       Electronically signed by resident: BITA MORGAN MD   Date:     11/07/17   Time:    01:45                  As the supervising and teaching physician, I personally reviewed the images and resident's interpretation and I agree with the findings.               Electronically signed by: VLADIMIR PUCKETT MD   Date:     11/07/17   Time:    02:42       X-Ray Chest 1 View   Final Result         Mild persistent but improving airspace disease. No acute appearing abnormality.         Electronically signed by: Keven Miller   Date:     11/06/17   Time:    23:05              Medical Decision Making:   History:   Old Medical Records: I decided to obtain old medical records.  Independently Interpreted Test(s):   I have ordered and independently interpreted EKG Reading(s) - see prior notes  Clinical Tests:   Lab Tests: Ordered and Reviewed  Radiological Study: Ordered and Reviewed  Medical Tests: Ordered and Reviewed  Pt with fall at home, prolonged time down on floor unable to get up.  Pt with rhabdo, mild renal failure.  Pt given IVF.  Pt is unable to bear wt or ambulate here in Ed.  Likely severe deconditioning.  Admit to inpt unit.              Scribe Attestation:   Scribe #1: I performed the above scribed service and the documentation accurately describes the services I performed. I attest to the accuracy of the note.    Attending  Attestation:           Physician Attestation for Scribe:  Physician Attestation Statement for Scribe #1: I, Lien Sagastume , reviewed documentation, as scribed by Shanelle Ross in my presence, and it is both accurate and complete.                 ED Course as of Nov 07 0252 Tue Nov 07, 2017   0221 CT Head Without Contrast [LG]      ED Course User Index  [LG] Lien Sagastume MD     Clinical Impression:   The primary encounter diagnosis was Traumatic rhabdomyolysis, initial encounter. Diagnoses of Fall and Unsteady gait were also pertinent to this visit.    Disposition:   Disposition: Admitted                        Lien Sagastume MD  11/11/17 1400

## 2017-11-07 NOTE — ASSESSMENT & PLAN NOTE
- Last echo 7/17: Normal left ventricular systolic function (EF 60-65%),Concentric remodeling,Wall motion abnormalities, Indeterminate LV diastolic function,Biatrial enlargement.   - Continue coreg and apixban.

## 2017-11-07 NOTE — PHARMACY MED REC
"Admission Medication Reconciliation - Pharmacy Consult Note    The home medication history was taken by Adrianne Barriga, Pharmacy Tech.  Based on information gathered and subsequent review by the clinical pharmacist, the items below may need attention.     You may go to "Admission" then "Reconcile Home Medications" tabs to review and/or act upon these items. Based on information gathered and subsequent review by the clinical pharmacist, the items below may need attention.    Potentially problematic discrepancies with current MAR  o Patient IS taking the following which was not ordered upon admit  o Timolol 0.5% 1 drop both eyes daily  o Travatan Z 0.004% 1 drop both eyes every evening      Please address this information as you see fit.  Feel free to contact us if you have any questions or require assistance.    Payton Christensen, PharmD  [id number]    Patient's prior to admission medication regimen was as follows:  Medication Sig    apixaban 2.5 mg Tab Take 1 tablet (2.5 mg total) by mouth 2 (two) times daily.    blood sugar diagnostic Strp Check glucose 2-3x/day before meals and/or bedtime (Patient taking differently: Check glucose 1-2 times daily)    COREG CR 20 mg 24 hr capsule Take 20 mg by mouth once daily.     ezetimibe-simvastatin 10-20 mg (VYTORIN) 10-20 mg per tablet TAKE 1 TABLET EVERY EVENING AT BEDTIME FOR CHOLESTEROL    ferrous sulfate dried (SLOW FE) 160 mg (50 mg iron) TbSR Take 1 tablet (160 mg total) by mouth once daily.    furosemide (LASIX) 40 MG tablet Take 1 tablet (40 mg total) by mouth once daily.    gabapentin (NEURONTIN) 300 MG capsule TAKE 1 CAPSULE BY MOUTH AT BEDTIME FOR DIABETIC NEUROPATHY    insulin glargine (LANTUS) 100 unit/mL injection Inject 50 Units into the skin once daily. 50 units Subcutaneous Every morning.  Pt needs 90 days of lantus-takes 50 units QHS (Patient taking differently: Inject 50 Units into the skin every evening. )    lancets (ACCU-CHEK SOFTCLIX LANCETS) Misc 1 strip " by Misc.(Non-Drug; Combo Route) route 2 (two) times daily. (Patient taking differently: 1 strip by Misc.(Non-Drug; Combo Route) route once daily. )    lansoprazole (PREVACID SOLUTAB) 30 MG disintegrating tablet Take 1 tablet (30 mg total) by mouth once daily. 1 Tablet,Rapid Dissolve, DR Sublingual Every day.  for acid reflux (Patient taking differently: Take 30 mg by mouth daily as needed (acid reflux). )    losartan-hydrochlorothiazide 100-12.5 mg (HYZAAR) 100-12.5 mg Tab Take 1 tablet by mouth once daily.    oxycodone (OXYCONTIN) 30 mg TR12 12 hr tablet Take 1 tablet (30 mg total) by mouth every 12 (twelve) hours. 1 tab Q12 hours as needed for Lumbar/Back pain (Patient taking differently: Take 30 mg by mouth daily as needed for Pain. )    SITagliptin (JANUVIA) 50 MG Tab Take 1 tablet (50 mg total) by mouth once daily. For Diabetes    tamsulosin (FLOMAX) 0.4 mg Cp24 Take 1 capsule (0.4 mg total) by mouth once daily. 1 Capsule, Sust. Release 24 hr Oral Every day.  for prostate (Patient taking differently: Take 0.4 mg by mouth once daily. )    timolol maleate 0.5% (TIMOPTIC) 0.5 % Drop Place 1 drop into both eyes once daily.     TRAVATAN Z 0.004 % Drop Place 1 drop into both eyes every evening.          Please add appropriate    SmartPhrase below:

## 2017-11-07 NOTE — SUBJECTIVE & OBJECTIVE
Past Medical History:   Diagnosis Date    *Atrial fibrillation     Allergy     Aortic sclerosis     BPH (benign prostatic hypertrophy)     Calcium nephrolithiasis     CKD (chronic kidney disease), stage III 10/16/2014    Colon polyps     Coronary artery disease     CPAP (continuous positive airway pressure) dependence     Degenerative disc disease     Diabetes mellitus     Hiatal hernia     Hyperlipidemia     Hyperprolactinemia 12/29/2014    Hypertension     Peripheral vascular disease     Proteinuria     Renal insufficiency     Renovascular hypertension 7/31/2012    Sleep apnea     Venous insufficiency        Past Surgical History:   Procedure Laterality Date    CATARACT EXTRACTION, BILATERAL      CHOLECYSTECTOMY      GASTRIC BYPASS         Review of patient's allergies indicates:  No Known Allergies    No current facility-administered medications on file prior to encounter.      Current Outpatient Prescriptions on File Prior to Encounter   Medication Sig    acetaminophen (TYLENOL ARTHRITIS PAIN) 650 MG TbSR Take 1 tablet (650 mg total) by mouth every 8 (eight) hours. 1 tab 2-3x/day for arthritis pain    apixaban 2.5 mg Tab Take 1 tablet (2.5 mg total) by mouth 2 (two) times daily.    azelastine-fluticasone 137-50 mcg/spray Spry nassal spray 1 spray by Each Nare route 2 (two) times daily.    blood sugar diagnostic Strp Check glucose 2-3x/day before meals and/or bedtime    cholestyramine (QUESTRAN) 4 gram packet 1 packet up to daily for Bowel Movements/Diarrhea prevention    COREG CR 20 mg 24 hr capsule     cyanocobalamin, vitamin B-12, 1,000 mcg TbSR     doxycycline (VIBRA-TABS) 100 MG tablet Take 1 tablet (100 mg total) by mouth 2 (two) times daily.    ezetimibe-simvastatin 10-20 mg (VYTORIN) 10-20 mg per tablet TAKE 1 TABLET EVERY EVENING AT BEDTIME FOR CHOLESTEROL    ferrous sulfate dried (SLOW FE) 160 mg (50 mg iron) TbSR Take 1 tablet (160 mg total) by mouth once daily.     furosemide (LASIX) 40 MG tablet Take 1 tablet (40 mg total) by mouth once daily.    gabapentin (NEURONTIN) 300 MG capsule TAKE 1 CAPSULE BY MOUTH AT BEDTIME FOR DIABETIC NEUROPATHY    insulin glargine (LANTUS) 100 unit/mL injection Inject 50 Units into the skin once daily. 50 units Subcutaneous Every morning.  Pt needs 90 days of lantus-takes 50 units QHS    ipratropium (ATROVENT) 0.06 % nasal spray 2 sprays by Nasal route 2 (two) times daily.    lancets (ACCU-CHEK SOFTCLIX LANCETS) Misc 1 strip by Misc.(Non-Drug; Combo Route) route 2 (two) times daily.    lansoprazole (PREVACID SOLUTAB) 30 MG disintegrating tablet Take 1 tablet (30 mg total) by mouth once daily. 1 Tablet,Rapid Dissolve, DR Sublingual Every day.  for acid reflux    ondansetron (ZOFRAN) 8 MG tablet Take 1 tablet (8 mg total) by mouth every 8 (eight) hours as needed for Nausea. 1 tab Q8 hours as needed for nausea    oxycodone (OXYCONTIN) 30 mg TR12 12 hr tablet Take 1 tablet (30 mg total) by mouth every 12 (twelve) hours. 1 tab Q12 hours as needed for Lumbar/Back pain    peg-electrolyte soln (TRILYTE WITH FLAVOR PACKETS) 420 gram SolR Take 1 kit by mouth as directed.    SITagliptin (JANUVIA) 50 MG Tab Take 1 tablet (50 mg total) by mouth once daily. For Diabetes    tamsulosin (FLOMAX) 0.4 mg Cp24 Take 1 capsule (0.4 mg total) by mouth once daily. 1 Capsule, Sust. Release 24 hr Oral Every day.  for prostate    timolol maleate 0.5% (TIMOPTIC) 0.5 % Drop Place 1 drop into both eyes once daily.     TRAVATAN Z 0.004 % Drop Place 1 drop into both eyes every evening.      Family History     None        Social History Main Topics    Smoking status: Former Smoker     Packs/day: 4.00     Years: 30.00     Quit date: 1/9/1990    Smokeless tobacco: Never Used      Comment: pt was up to 4 packs a day before.    Alcohol use No    Drug use: No    Sexual activity: Yes     Partners: Female     Review of Systems   Constitutional: Negative for chills and  fever.   HENT: Negative for congestion.    Eyes: Negative for visual disturbance.   Respiratory: Negative for cough and shortness of breath.    Cardiovascular: Negative for chest pain, palpitations and leg swelling.   Gastrointestinal: Negative for abdominal distention, abdominal pain, diarrhea, nausea and vomiting.   Endocrine: Negative for polyphagia and polyuria.   Genitourinary: Negative for dysuria and hematuria.   Musculoskeletal: Negative for back pain.   Neurological: Negative for dizziness and light-headedness.   Psychiatric/Behavioral: Negative for agitation and behavioral problems.     Objective:     Vital Signs (Most Recent):  Temp: 97.8 °F (36.6 °C) (11/06/17 1823)  Pulse: 71 (11/07/17 0331)  Resp: 18 (11/07/17 0331)  BP: (!) 176/81 (11/07/17 0226)  SpO2: 98 % (11/07/17 0331) Vital Signs (24h Range):  Temp:  [97.8 °F (36.6 °C)] 97.8 °F (36.6 °C)  Pulse:  [69-78] 71  Resp:  [14-21] 18  SpO2:  [94 %-100 %] 98 %  BP: (157-190)/(78-81) 176/81     Weight: 103 kg (227 lb)  Body mass index is 31.66 kg/m².    Physical Exam   Constitutional: He is oriented to person, place, and time. He appears well-developed and well-nourished. No distress.   HENT:   Head: Normocephalic and atraumatic.   Eyes: Pupils are equal, round, and reactive to light.   Neck: Normal range of motion. Neck supple.   Cardiovascular: Normal rate and normal heart sounds.    Pulmonary/Chest: Effort normal and breath sounds normal.   Abdominal: Soft. Bowel sounds are normal.   Musculoskeletal: Normal range of motion. He exhibits edema. He exhibits no tenderness.   Chronic venous stasis type changes with violaceous discoloration of pre-tibial locations/no edema    Neurological: He is alert and oriented to person, place, and time. No sensory deficit. He exhibits normal muscle tone.   Skin: Skin is warm and dry. He is not diaphoretic.        Significant Labs:   CBC:   Recent Labs  Lab 11/06/17  2234   WBC 8.88   HGB 9.8*   HCT 32.1*         CMP:   Recent Labs  Lab 11/06/17  2234      K 5.3*   *   CO2 20*   *   BUN 62*   CREATININE 1.8*   CALCIUM 8.9   PROT 6.5   ALBUMIN 2.9*   BILITOT 1.8*   ALKPHOS 101   AST 27   ALT 31   ANIONGAP 10   EGFRNONAA 34.5*     Urine Studies:   Recent Labs  Lab 11/07/17  0031   COLORU Yellow   APPEARANCEUA Clear   PHUR 5.0   SPECGRAV 1.015   PROTEINUA 2+*   GLUCUA Negative   KETONESU 1+*   BILIRUBINUA Negative   OCCULTUA 2+*   NITRITE Negative   UROBILINOGEN Negative   LEUKOCYTESUR Negative   RBCUA 6*   WBCUA 3   BACTERIA Occasional   SQUAMEPITHEL 1   HYALINECASTS 0       Significant Imaging: I have reviewed and interpreted all pertinent imaging results/findings within the past 24 hours.

## 2017-11-07 NOTE — ASSESSMENT & PLAN NOTE
- On insulin Lantus 50 U daily.  - POCT QID  - will start detemir 10 U and low does ISS, due to pt dehydration and poor oral intake.

## 2017-11-07 NOTE — PT/OT/SLP EVAL
Occupational Therapy  Evaluation    Joseph Valerio   MRN: [id number]   Admitting Diagnosis: Traumatic rhabdomyolysis    OT Date of Treatment: 11/07/17   OT Start Time: 0915  OT Stop Time: 0944  OT Total Time (min): 29 min    Billable Minutes:  Evaluation 15  Self Care/Home Management 14    Diagnosis: Traumatic rhabdomyolysis   Recent fall out of chair.    Past Medical History:   Diagnosis Date    *Atrial fibrillation     Allergy     Aortic sclerosis     BPH (benign prostatic hypertrophy)     CAD (coronary artery disease) 7/31/2012    Calcium nephrolithiasis     Chronic anticoagulation - apixaban  11/7/2017    For AFib    Chronic atrial fibrillation 10/16/2014    CKD (chronic kidney disease), stage III 10/16/2014    Colon polyps     Coronary artery disease     CPAP (continuous positive airway pressure) dependence     Degenerative disc disease     Diabetes mellitus     Encephalopathy, toxic 6/19/2017    Erectile dysfunction 8/20/2012    Hiatal hernia     HLD (hyperlipidemia) 7/31/2012    Hyperlipidemia     Hyperprolactinemia 12/29/2014    Hypertension     Increased prolactin level 9/17/2012    39.9 (3-15 range)     Lumbar disc disease 7/31/2012    Male hypogonadism 9/12/2012    Obesity 7/31/2012    S/p Gastric Bypass Surgery     Peripheral vascular disease     Pituitary microadenoma 2/26/2013    Pneumonia, community acquired 6/18/2017    Proteinuria     Renal insufficiency     Renovascular hypertension 7/31/2012    Severe sepsis 6/19/2017    Sleep apnea     Type 2 diabetes mellitus with stage 3 chronic kidney disease, with long-term current use of insulin 7/31/2012    Venous insufficiency       Past Surgical History:   Procedure Laterality Date    CATARACT EXTRACTION, BILATERAL      CHOLECYSTECTOMY      GASTRIC BYPASS         Referring physician: Marvin  Date referred to OT: 11/7/17    General Precautions: Standard, fall  Orthopedic Precautions:    Braces:            Patient  History:  Living Environment  Lives With: alone  Living Arrangements: house  Living Environment Comment: Pt lives in a [facility] with a walk-in shower - was (I) with ADLs and used a RW for ambulation. Family live nearby and can assist PRN; (+) driving.  Equipment Currently Used at Home: walker, rolling, cane, straight    Prior level of function:   Bed Mobility/Transfers: needs device  Grooming: independent  Bathing: independent  Upper Body Dressing: independent  Lower Body Dressing: independent  Toileting: independent        Dominant hand: right    Subjective:  Communicated with RN prior to session.  Chief Complaint: Weakness  Patient/Family stated goals: Return to PLOF.    Pain/Comfort  Pain Rating 1: 0/10    Objective:  Patient found with: peripheral IV, telemetry    Cognitive Exam:  Oriented to: Person, Place, Time and Situation  Follows Commands/attention: Follows multistep  commands  Communication: clear/fluent  Memory:  No Deficits noted  Safety awareness/insight to disability: intact  Coping skills/emotional control: Appropriate to situation    Visual/perceptual:  Intact    Physical Exam:  Postural examination/scapula alignment: No postural abnormalities identified  Skin integrity: Visible skin intact  Edema: Mild BLEs    Sensation:   Intact    Upper Extremity Range of Motion:  Right Upper Extremity: WNL  Left Upper Extremity: WNL    Upper Extremity Strength:  Right Upper Extremity: WFL  Left Upper Extremity: WFL   Strength: wnl    Fine motor coordination:   Intact    Gross motor coordination: WFL    Functional Mobility:  Bed Mobility:  Rolling/Turning Right: Contact guard assistance  Scooting/Bridging: Contact Guard Assistance  Supine to Sit: Minimum Assistance  Sit to Supine: Contact Guard Assistance    Transfers:  Sit <> Stand Assistance: Minimum Assistance  Sit <> Stand Assistive Device: Rolling Walker  Toilet Transfer Technique: Stand Pivot  Toilet Transfer Assistance: Contact Guard Assistance, Moderate  "Assistance (CGA to sit; Mod A to stand)  Toilet Transfer Assistive Device: Rolling Walker    Functional Ambulation: Pt walked from bed<>bathroom, ~ 15 ft x 2 with CGA using a RW.    Activities of Daily Living:     UE Dressing Level of Assistance: Supervision  LE Dressing Level of Assistance: Total assistance        Toileting Where Assessed: Toilet  Toileting Level of Assistance: Stand by assistance     Balance:   Static Sit: GOOD: Takes MODERATE challenges from all directions  Dynamic Sit: GOOD: Maintains balance through MODERATE excursions of active trunk movement  Static Stand: GOOD: Takes MODERATE challenges from all directions  Dynamic stand: FAIR+: Needs CLOSE SUPERVISION during gait and is able to right self with minor LOB    Therapeutic Activities and Exercises:  UE ROM/MMT  Bed mobility training / assessment  Functional mobility assessment  Sit/standing balance assessment  Discussed OT POC / Post-acute plan    AM-PAC 6 CLICK ADL  How much help from another person does this patient currently need?  1 = Unable, Total/Dependent Assistance  2 = A lot, Maximum/Moderate Assistance  3 = A little, Minimum/Contact Guard/Supervision  4 = None, Modified Fisher/Independent    Putting on and taking off regular lower body clothing? : 1  Bathing (including washing, rinsing, drying)?: 3  Toileting, which includes using toilet, bedpan, or urinal? : 3  Putting on and taking off regular upper body clothing?: 3  Taking care of personal grooming such as brushing teeth?: 4  Eating meals?: 4  Total Score: 18    AM-PAC Raw Score CMS "G-Code Modifier Level of Impairment Assistance   6 % Total / Unable   7 - 9 CM 80 - 100% Maximal Assist   10-14 CL 60 - 80% Moderate Assist   15 - 19 CK 40 - 60% Moderate Assist   20 - 22 CJ 20 - 40% Minimal Assist   23 CI 1-20% SBA / CGA   24 CH 0% Independent/ Mod I       Patient left EOB with all lines intact, call button in reach and EOB    Assessment:  Joseph Valerio is a 82 y.o. " male with a medical diagnosis of Traumatic rhabdomyolysis and presents with decreased (I) in multiple ADL areas, functional mobility & t/fs as well as decreased overall strength, ROM, endurance and balance. Pt would benefit from skilled OT services as well as HHOT to address these deficits and to facilitate improving (I) with daily tasks. Pt has a history of falling recently so would also benefit from education on fall prevention/safety awareness.    Pt evaluation falls under low complexity for evaluation coding due to performance deficits noted in 1-3 areas as stated above and 0 co-morbities affecting current functional status. Data obtained from problem focused assessments. No modifications or assistance was required for completion of evaluation. Only brief occupational profile and history review completed.     Rehab identified problem list/impairments: Rehab identified problem list/impairments: weakness, gait instability, impaired balance, impaired endurance, decreased safety awareness, impaired self care skills, impaired functional mobilty, decreased coordination, decreased lower extremity function    Rehab potential is good.    Activity tolerance: Good    Discharge recommendations: Discharge Facility/Level Of Care Needs: home health OT     Barriers to discharge: Barriers to Discharge: Decreased caregiver support    Equipment recommendations: shower chair     GOALS:    Occupational Therapy Goals        Problem: Occupational Therapy Goal    Goal Priority Disciplines Outcome Interventions   Occupational Therapy Goal     OT, PT/OT Ongoing (interventions implemented as appropriate)    Description:  Goals to be met by: 11/14/17    Patient will increase functional independence with ADLs by performing:    LE Dressing with Stand-by Assistance and Assistive Devices as needed.  Grooming while standing at sink with Supervision.  Toileting from toilet with Supervision for hygiene and clothing management.   Supine to sit  with Day.  Stand pivot transfers with Supervision.  Toilet transfer to toilet with Stand-by Assistance.                      PLAN:  Patient to be seen 3 x/week to address the above listed problems via self-care/home management, therapeutic exercises, therapeutic activities  Plan of Care expires: 12/07/17  Plan of Care reviewed with: patient         MILLER Krueger  11/07/2017

## 2017-11-07 NOTE — PLAN OF CARE
Problem: Occupational Therapy Goal  Goal: Occupational Therapy Goal  Goals to be met by: 11/14/17    Patient will increase functional independence with ADLs by performing:    LE Dressing with Stand-by Assistance and Assistive Devices as needed.  Grooming while standing at sink with Supervision.  Toileting from toilet with Supervision for hygiene and clothing management.   Supine to sit with Cloud.  Stand pivot transfers with Supervision.  Toilet transfer to toilet with Stand-by Assistance.    Outcome: Ongoing (interventions implemented as appropriate)  Evaluation completed and POC established.    MILLER Haynes

## 2017-11-07 NOTE — PLAN OF CARE
Problem: Physical Therapy Goal  Goal: Physical Therapy Goal  Goals to be met by: 2017     Patient will increase functional independence with mobility by performin. Supine to sit with Modified Barton  2. Sit to supine with Modified Barton  3. Sit to stand transfer with Contact Guard Assistance  4. Bed to chair transfer with Contact Guard Assistance using Rolling Walker  5. Gait  x 150 feet with Contact Guard Assistance using Rolling Walker.     Outcome: Ongoing (interventions implemented as appropriate)  Evaluation complete. Goals appropriate to improve functional mobility.    Олег Isidro III, AURELIANOT, PT  2017

## 2017-11-07 NOTE — PLAN OF CARE
[facility]    HOME HEALTH ORDERS  FACE TO FACE ENCOUNTER    Patient Name: Joseph Valerio  YOB: 1935    PCP: Thompson Stiles MD   PCP Address: [address] / [place] [address]  PCP Phone Number: [phone]  PCP Fax: [phone]    Encounter Date: 11/07/2017    Admit to Home Health    Diagnoses:  Active Hospital Problems    Diagnosis  POA    *Traumatic rhabdomyolysis [T79.6XXA]  Yes    Stage 3 chronic kidney disease [N18.3]  Yes    Weakness [R53.1]  Yes    Chronic anticoagulation - apixaban  [Z79.01]  Not Applicable     Chronic     For AFib      Chronic atrial fibrillation [I48.2]  Yes    Type 2 diabetes mellitus with stage 3 chronic kidney disease, with long-term current use of insulin [E11.22, N18.3, Z79.4]  Not Applicable    Hyperlipidemia [E78.5]  Yes     Chronic    Benign prostatic hyperplasia [N40.0]  Yes     Chronic    CARMELO (obstructive sleep apnea) [G47.33]  Yes    Venous stasis of lower extremity [I87.8]  Yes    Essential hypertension [I10]  Yes      Resolved Hospital Problems    Diagnosis Date Resolved POA   No resolved problems to display.       Future Appointments  Date Time Provider Department Center   12/13/2017 8:15 AM LAB, [facility] [place]   12/20/2017 8:00 AM Thompson Stiles MD [facility]     Follow-up Information     Thompson Stiles MD. Go on 12/20/2017.    Specialty:  Internal Medicine  Why:  8:00am  Contact information:  [address]  [facility] [address]  [phone]                     I have seen and examined this patient face to face today. My clinical findings that support the need for the home health skilled services and home bound status are the following:  Weakness/numbness causing balance and gait disturbance due to Weakness/Debility making it taxing to leave home.    Allergies:Review of patient's allergies indicates:  No Known Allergies    Diet: cardiac diet and diabetic diet: 2000  calorie    Activities: As tolerated    Nursing:   SN to complete comprehensive assessment including routine vital signs. Instruct on disease process and s/s of complications to report to MD. Review/verify medication list sent home with the patient at time of discharge  and instruct patient/caregiver as needed. Frequency may be adjusted depending on start of care date.    Notify MD if SBP > 160 or < 90; DBP > 90 or < 50; HR > 120 or < 50; Temp > 101      CONSULTS:    Physical Therapy to evaluate and treat. Evaluate for home safety and equipment needs; Establish/upgrade home exercise program. Perform / instruct on therapeutic exercises, gait training, transfer training, and Range of Motion.  Occupational Therapy to evaluate and treat. Evaluate home environment for safety and equipment needs. Perform/Instruct on transfers, ADL training, ROM, and therapeutic exercises.  Aide to provide assistance with personal care, ADLs, and vital signs.    MISCELLANEOUS CARE:  N/A    Home Safety Evaluation    WOUND CARE ORDERS  n/a      Medications: Review discharge medications with patient and family and provide education.       Joseph Valerio   Home Medication Instructions KARI:[id number]    Printed on:11/08/17 1138   Medication Information                      apixaban 2.5 mg Tab  Take 1 tablet (2.5 mg total) by mouth 2 (two) times daily.             blood sugar diagnostic Strp  Check glucose 2-3x/day before meals and/or bedtime             COREG CR 20 mg 24 hr capsule  Take 20 mg by mouth once daily.              ferrous sulfate dried (SLOW FE) 160 mg (50 mg iron) TbSR  Take 1 tablet (160 mg total) by mouth once daily.             furosemide (LASIX) 40 MG tablet  Take 1 tablet (40 mg total) by mouth once daily.             gabapentin (NEURONTIN) 300 MG capsule  TAKE 1 CAPSULE BY MOUTH AT BEDTIME FOR DIABETIC NEUROPATHY             insulin glargine (LANTUS) 100 unit/mL injection  Inject 50 Units into the skin once daily. 50  units Subcutaneous Every morning.  Pt needs 90 days of lantus-takes 50 units QHS             lancets (ACCU-CHEK SOFTCLIX LANCETS) Misc  1 strip by Misc.(Non-Drug; Combo Route) route 2 (two) times daily.             lansoprazole (PREVACID SOLUTAB) 30 MG disintegrating tablet  Take 1 tablet (30 mg total) by mouth once daily. 1 Tablet,Rapid Dissolve, DR Sublingual Every day.  for acid reflux             losartan (COZAAR) 100 MG tablet  Take 1 tablet (100 mg total) by mouth once daily.             oxycodone (OXYCONTIN) 30 mg TR12 12 hr tablet  Take 1 tablet (30 mg total) by mouth every 12 (twelve) hours. 1 tab Q12 hours as needed for Lumbar/Back pain             SITagliptin (JANUVIA) 50 MG Tab  Take 1 tablet (50 mg total) by mouth once daily. For Diabetes             tamsulosin (FLOMAX) 0.4 mg Cp24  Take 1 capsule (0.4 mg total) by mouth once daily. 1 Capsule, Sust. Release 24 hr Oral Every day.  for prostate             timolol maleate 0.5% (TIMOPTIC) 0.5 % Drop  Place 1 drop into both eyes once daily.              TRAVATAN Z 0.004 % Drop  Place 1 drop into both eyes every evening.                  I certify that this patient is confined to his home and needs intermittent skilled nursing care, physical therapy and occupational therapy.

## 2017-11-07 NOTE — ASSESSMENT & PLAN NOTE
- PT was previously on losartan/HCZT, stopped by PCP on due to elevated crt. 7/17  - hold Lasix 40 mg

## 2017-11-07 NOTE — ED NOTES
Two patient identifiers checked and confirmed.    APPEARANCE: Resting comfortably in no acute distress. Patient has clean hair, skin and nails. Clothing is appropriate and properly fastened.  NEURO: Awake, alert, appropriate for age, and cooperative with a calm affect; pupils equal and round. Oriented x4.  HEENT: Head symmetrical. Bilateral eyes without redness or drainage. Denies dizziness or headache. Pt hard of hearing.  CARDIAC: Regular rate and rhythm.   RESPIRATORY: Airway is open and patent. Respirations are spontaneous on room air. Normal respiratory effort and rate noted.  GI/: Abdomen soft and non-distended. Patient is reported to void and stool appropriately for age.  NEUROVASCULAR: All extremities are warm and pink with +2 pulses. Cap refill about 4 seconds on lower extremities. Redness and warmth noted to bilateral calfs.   MUSCULOSKELETAL: Moves all extremities well; no obvious deformities noted.  SKIN: Warm and dry, adequate turgor, mucus membranes moist and pink. Multiple bruises noted to bilaterally extremities.

## 2017-11-08 VITALS
HEIGHT: 71 IN | OXYGEN SATURATION: 99 % | BODY MASS INDEX: 31.78 KG/M2 | RESPIRATION RATE: 18 BRPM | HEART RATE: 63 BPM | DIASTOLIC BLOOD PRESSURE: 81 MMHG | TEMPERATURE: 98 F | WEIGHT: 227 LBS | SYSTOLIC BLOOD PRESSURE: 146 MMHG

## 2017-11-08 LAB
ALBUMIN SERPL BCP-MCNC: 2.6 G/DL
ALP SERPL-CCNC: 86 U/L
ALT SERPL W/O P-5'-P-CCNC: 18 U/L
ANION GAP SERPL CALC-SCNC: 6 MMOL/L
AST SERPL-CCNC: 15 U/L
BASOPHILS # BLD AUTO: 0.03 K/UL
BASOPHILS NFR BLD: 0.4 %
BILIRUB SERPL-MCNC: 1.1 MG/DL
BUN SERPL-MCNC: 55 MG/DL
CALCIUM SERPL-MCNC: 8.6 MG/DL
CHLORIDE SERPL-SCNC: 115 MMOL/L
CO2 SERPL-SCNC: 21 MMOL/L
CREAT SERPL-MCNC: 1.6 MG/DL
DIFFERENTIAL METHOD: ABNORMAL
EOSINOPHIL # BLD AUTO: 0.1 K/UL
EOSINOPHIL NFR BLD: 1.4 %
ERYTHROCYTE [DISTWIDTH] IN BLOOD BY AUTOMATED COUNT: 13.4 %
EST. GFR  (AFRICAN AMERICAN): 46 ML/MIN/1.73 M^2
EST. GFR  (NON AFRICAN AMERICAN): 39.8 ML/MIN/1.73 M^2
GLUCOSE SERPL-MCNC: 134 MG/DL
HCT VFR BLD AUTO: 29.7 %
HGB BLD-MCNC: 9.4 G/DL
IMM GRANULOCYTES # BLD AUTO: 0.04 K/UL
IMM GRANULOCYTES NFR BLD AUTO: 0.5 %
LYMPHOCYTES # BLD AUTO: 1.3 K/UL
LYMPHOCYTES NFR BLD: 17.1 %
MCH RBC QN AUTO: 28.1 PG
MCHC RBC AUTO-ENTMCNC: 31.6 G/DL
MCV RBC AUTO: 89 FL
MONOCYTES # BLD AUTO: 0.7 K/UL
MONOCYTES NFR BLD: 9.3 %
NEUTROPHILS # BLD AUTO: 5.2 K/UL
NEUTROPHILS NFR BLD: 71.3 %
NRBC BLD-RTO: 0 /100 WBC
PLATELET # BLD AUTO: 159 K/UL
PMV BLD AUTO: 11.6 FL
POCT GLUCOSE: 108 MG/DL (ref 70–110)
POCT GLUCOSE: 139 MG/DL (ref 70–110)
POTASSIUM SERPL-SCNC: 4.5 MMOL/L
PROT SERPL-MCNC: 6 G/DL
RBC # BLD AUTO: 3.34 M/UL
SODIUM SERPL-SCNC: 142 MMOL/L
WBC # BLD AUTO: 7.33 K/UL

## 2017-11-08 PROCEDURE — 25000003 PHARM REV CODE 250: Performed by: STUDENT IN AN ORGANIZED HEALTH CARE EDUCATION/TRAINING PROGRAM

## 2017-11-08 PROCEDURE — 85025 COMPLETE CBC W/AUTO DIFF WBC: CPT

## 2017-11-08 PROCEDURE — 36415 COLL VENOUS BLD VENIPUNCTURE: CPT

## 2017-11-08 PROCEDURE — 99239 HOSP IP/OBS DSCHRG MGMT >30: CPT | Mod: GC,,, | Performed by: INTERNAL MEDICINE

## 2017-11-08 PROCEDURE — 80053 COMPREHEN METABOLIC PANEL: CPT

## 2017-11-08 RX ORDER — LOSARTAN POTASSIUM 50 MG/1
100 TABLET ORAL DAILY
Status: DISCONTINUED | OUTPATIENT
Start: 2017-11-08 | End: 2017-11-08 | Stop reason: HOSPADM

## 2017-11-08 RX ORDER — TIMOLOL MALEATE 5 MG/ML
1 SOLUTION/ DROPS OPHTHALMIC DAILY
Status: DISCONTINUED | OUTPATIENT
Start: 2017-11-08 | End: 2017-11-08 | Stop reason: HOSPADM

## 2017-11-08 RX ORDER — FUROSEMIDE 40 MG/1
40 TABLET ORAL DAILY
Status: DISCONTINUED | OUTPATIENT
Start: 2017-11-08 | End: 2017-11-08 | Stop reason: HOSPADM

## 2017-11-08 RX ORDER — LOSARTAN POTASSIUM 100 MG/1
100 TABLET ORAL DAILY
Qty: 90 TABLET | Refills: 3 | Status: SHIPPED | OUTPATIENT
Start: 2017-11-08 | End: 2018-11-08

## 2017-11-08 RX ADMIN — FUROSEMIDE 40 MG: 40 TABLET ORAL at 12:11

## 2017-11-08 RX ADMIN — PANTOPRAZOLE SODIUM 40 MG: 40 TABLET, DELAYED RELEASE ORAL at 08:11

## 2017-11-08 RX ADMIN — APIXABAN 2.5 MG: 2.5 TABLET, FILM COATED ORAL at 08:11

## 2017-11-08 RX ADMIN — TAMSULOSIN HYDROCHLORIDE 0.4 MG: 0.4 CAPSULE ORAL at 08:11

## 2017-11-08 RX ADMIN — LOSARTAN POTASSIUM 100 MG: 50 TABLET, FILM COATED ORAL at 12:11

## 2017-11-08 RX ADMIN — Medication 150 MG: at 08:11

## 2017-11-08 RX ADMIN — CARVEDILOL 6.25 MG: 6.25 TABLET, FILM COATED ORAL at 08:11

## 2017-11-08 RX ADMIN — INSULIN DETEMIR 10 UNITS: 100 INJECTION, SOLUTION SUBCUTANEOUS at 08:11

## 2017-11-08 NOTE — PLAN OF CARE
11/08/17 1210   Final Note   Assessment Type Final Discharge Note   Discharge Disposition Home-Health   Hospital Follow Up  Appt(s) scheduled? Yes   Discharge plans and expectations educations in teach back method with documentation complete? Yes   Right Care Referral Info   Post Acute Recommendation Home-care     Future Appointments  Date Time Provider Department Center   11/17/2017 4:00 PM Francisco Schafer MD [facility] [address] [place]   12/13/2017 8:15 AM LAB, [facility] [place]   12/20/2017 8:00 AM Thompson Stiles MD [facility] [place]

## 2017-11-08 NOTE — ASSESSMENT & PLAN NOTE
- On insulin Lantus 50 U daily.  - POCT QID  - detemir 10 U and low does ISS, due to pt dehydration and poor oral intake.

## 2017-11-08 NOTE — PLAN OF CARE
11/08/17 0955   Medicare Message   Important Message from Medicare regarding Discharge Appeal Rights Given to patient/caregiver;Explained to patient/caregiver;Signed/date by patient/caregiver   Date IMM was signed 11/08/17   Time IMM was signed 0955

## 2017-11-08 NOTE — PROGRESS NOTES
Patient discharged home, went in and discussed patient's discharge instructions with him.  Discussed medication list and explained a new medication named losartan was given for blood pressure and I gave him his first dose and explained that he needs to  his medication from the pharmacy and I also discussed scheduled physician appointments.  Peripheral IV removed.  Patient verbalized understanding and acknowledged the discharge paper work he was given.

## 2017-11-08 NOTE — DISCHARGE SUMMARY
DISCHARGE SUMMARY  Hospital Medicine    Team: [facility] HOSP MED 5    Patient Name: Joseph Valerio  YOB: 1935    Admit Date: 11/6/2017    Discharge Date: 11/08/2017     Discharge Attending Physician: Francisco Schafer MD     Resident on Service: Laura Cardenas DO    Chief Complaint: Traumatic rhabdomyolysis    Princilpal Diagnoses:  Active Hospital Problems    Diagnosis  POA    *Traumatic rhabdomyolysis [T79.6XXA]  Yes    Stage 3 chronic kidney disease [N18.3]  Yes    Weakness [R53.1]  Yes    Chronic anticoagulation - apixaban  [Z79.01]  Not Applicable     Chronic     For AFib      Chronic atrial fibrillation [I48.2]  Yes    Type 2 diabetes mellitus with stage 3 chronic kidney disease, with long-term current use of insulin [E11.22, N18.3, Z79.4]  Not Applicable    Hyperlipidemia [E78.5]  Yes     Chronic    Benign prostatic hyperplasia [N40.0]  Yes     Chronic    CARMELO (obstructive sleep apnea) [G47.33]  Yes    Venous stasis of lower extremity [I87.8]  Yes    Essential hypertension [I10]  Yes      Resolved Hospital Problems    Diagnosis Date Resolved POA   No resolved problems to display.       Discharged Condition: Admit problems have resolved     HOSPITAL COURSE:      Initial Presentation:    This is a 82 y.o. male with a PMHx of HTN, DM, HLD, CAD, degenerative disc disease,PVD, atrial fibrillation on apixaban, CARMELO and CKD who presents to the ED s/p fall yesterday.      Patient reports that he slipped from the chair couldn't get up , started crawling around the house to get to a phone but was unable to do so as all the phones were not working, he had no access to the kitchen to have water or food for 2 days. He lives alone. Patient denies chest pain prior to fall, striking his head or losing consciousness. Additionally denies neck pain, pain in upper extremities.      Per the patient's son, he was on the floor for a day and a half. The patient's son states that his diabetic neuropathy has been  getting progressively worse over the last few weeks, and the patient has been complaining over increased pain and weakness in his lower extremities. He was too weak to pull himself up after he fell and he is too weak to walk at the current time. The patient reports that he occasionally uses a cane at home, and he also has a walker. The patient's son notes that the patient has new facial and periorbital swelling.     Course of Principle Problem for Admission:    Admitted to IM5. Given IVF continuous infusion for rhabdomyolysis. PT/OT ordered. Stable for d/c home with HH and PCC f/u scheduled.     Medical Problems Addressed in the Hospital:    Traumatic rhabdomyolysis     - due to the fall and crawling for more than 24 hrs  - CPK >700  - IVF infusion  - daily CPK, improving  - will f/u with scheduled PCC, can repeat CPK at that time  - cont to hold statin       Weakness     - dehydration and poor intake  - PT/OT: recommend HH (pt refused any SNF or rehab)  - Home Safety Evaluation written on HH orders          Stage 3 chronic kidney disease     - stable cret at baseline          Chronic atrial fibrillation     - Last echo 7/17: Normal left ventricular systolic function (EF 60-65%),Concentric remodeling,Wall motion abnormalities, Indeterminate LV diastolic function,Biatrial enlargement.   - Continue coreg and apixban.          Venous stasis of lower extremity     - no active changes.             CARMELO (obstructive sleep apnea)     - not on CPAP.          Benign prostatic hyperplasia     - Continue home flumax          Hyperlipidemia     - Hold statin due to high CPK          Essential hypertension     - PT was previously on losartan/HCZT, stopped by PCP on due to elevated crt. 7/17  - held Lasix 40 mg on admit  - upon d/c, restarted lasix and started losartan 100 mg daily (instead of combo losartan/HCTZ 100-12.5)          Type 2 diabetes mellitus with stage 3 chronic kidney disease, with long-term current use of insulin      - On insulin Lantus 50 U daily.  - POCT QID  - detemir 10 U and low does ISS, due to pt dehydration and poor oral intake.         CONSULTS: n/a    Last CBC/BMP/HgbA1c (if applicable):  Recent Results (from the past 336 hour(s))   CBC with Automated Differential    Collection Time: 11/08/17  5:16 AM   Result Value Ref Range    WBC 7.33 3.90 - 12.70 K/uL    Hemoglobin 9.4 (L) 14.0 - 18.0 g/dL    Hematocrit 29.7 (L) 40.0 - 54.0 %    Platelets 159 150 - 350 K/uL   CBC with Automated Differential    Collection Time: 11/07/17  4:01 AM   Result Value Ref Range    WBC 9.19 3.90 - 12.70 K/uL    Hemoglobin 9.7 (L) 14.0 - 18.0 g/dL    Hematocrit 32.9 (L) 40.0 - 54.0 %    Platelets 157 150 - 350 K/uL   CBC auto differential    Collection Time: 11/06/17 10:34 PM   Result Value Ref Range    WBC 8.88 3.90 - 12.70 K/uL    Hemoglobin 9.8 (L) 14.0 - 18.0 g/dL    Hematocrit 32.1 (L) 40.0 - 54.0 %    Platelets 169 150 - 350 K/uL     No results found for this or any previous visit (from the past 336 hour(s)).  Lab Results   Component Value Date    HGBA1C 6.0 (H) 08/23/2017       Other Pertinent Lab Findings:   --> 675      Disposition:  Home with Home Health         Future Scheduled Appointments:  Future Appointments  Date Time Provider Department Center   11/17/2017 4:00 PM Francisco Schafer MD [facility]   12/13/2017 8:15 AM LAB, [facility] [place]   12/20/2017 8:00 AM Thompson Stiles MD [facility]         Discharge Medication List:       Joseph Valerio   Home Medication Instructions KARI:[id number]    Printed on:11/08/17 5468   Medication Information                      apixaban 2.5 mg Tab  Take 1 tablet (2.5 mg total) by mouth 2 (two) times daily.             blood sugar diagnostic Strp  Check glucose 2-3x/day before meals and/or bedtime             COREG CR 20 mg 24 hr capsule  Take 20 mg by mouth once daily.              ferrous sulfate dried (SLOW FE) 160 mg (50 mg iron) TbSR  Take 1  tablet (160 mg total) by mouth once daily.             furosemide (LASIX) 40 MG tablet  Take 1 tablet (40 mg total) by mouth once daily.             gabapentin (NEURONTIN) 300 MG capsule  TAKE 1 CAPSULE BY MOUTH AT BEDTIME FOR DIABETIC NEUROPATHY             insulin glargine (LANTUS) 100 unit/mL injection  Inject 50 Units into the skin once daily. 50 units Subcutaneous Every morning.  Pt needs 90 days of lantus-takes 50 units QHS             lancets (ACCU-CHEK SOFTCLIX LANCETS) Misc  1 strip by Misc.(Non-Drug; Combo Route) route 2 (two) times daily.             lansoprazole (PREVACID SOLUTAB) 30 MG disintegrating tablet  Take 1 tablet (30 mg total) by mouth once daily. 1 Tablet,Rapid Dissolve, DR Sublingual Every day.  for acid reflux             losartan (COZAAR) 100 MG tablet  Take 1 tablet (100 mg total) by mouth once daily.             oxycodone (OXYCONTIN) 30 mg TR12 12 hr tablet  Take 1 tablet (30 mg total) by mouth every 12 (twelve) hours. 1 tab Q12 hours as needed for Lumbar/Back pain             SITagliptin (JANUVIA) 50 MG Tab  Take 1 tablet (50 mg total) by mouth once daily. For Diabetes             tamsulosin (FLOMAX) 0.4 mg Cp24  Take 1 capsule (0.4 mg total) by mouth once daily. 1 Capsule, Sust. Release 24 hr Oral Every day.  for prostate             timolol maleate 0.5% (TIMOPTIC) 0.5 % Drop  Place 1 drop into both eyes once daily.              TRAVATAN Z 0.004 % Drop  Place 1 drop into both eyes every evening.                  Patient Instructions:    Discharge Procedure Orders  Diet Diabetic 2000 Calories     Diet Low Sodium, 2gm     Activity as tolerated     Call MD for:  temperature >100.4     Call MD for:  persistent nausea and vomiting or diarrhea     Call MD for:  severe uncontrolled pain     Call MD for:  difficulty breathing or increased cough     Call MD for:  severe persistent headache     Call MD for:  persistent dizziness, light-headedness, or visual disturbances     Call MD for:   increased confusion or weakness           Signing Physician:  Laura Cardenas MD

## 2017-11-08 NOTE — PROGRESS NOTES
[facility] Hospital Medicine  Progress Note    Patient Name: Joseph Valerio  MRN: [id number]  Patient Class: IP- Inpatient   Admission Date: 11/6/2017  Length of Stay: 1 days  Attending Physician: Francisco Schafer MD  Primary Care Provider: Thompson Stiles MD    [facility] Medicine Team: [facility] HOSP MED 5 Laura Cardenas MD    Subjective:     Principal Problem:Traumatic rhabdomyolysis    HPI:  This is a 82 y.o. male with a PMHx of HTN, DM, HLD, CAD, degenerative disc disease,PVD, atrial fibrillation on apixaban, CARMELO and CKD who presents to the ED s/p fall yesterday.     Patient reports that he slipped from the chair couldn't get up , started crawling around the house to get to a phone but was unable to do so as all the phones were not working, he had no access to the kitchen to have water or food for 2 days. He lives alone. Patient denies chest pain prior to fall, striking his head or losing consciousness. Additionally denies neck pain, pain in upper extremities.     Per the patient's son, he was on the floor for a day and a half. The patient's son states that his diabetic neuropathy has been getting progressively worse over the last few weeks, and the patient has been complaining over increased pain and weakness in his lower extremities. He was too weak to pull himself up after he fell and he is too weak to walk at the current time. The patient reports that he occasionally uses a cane at home, and he also has a walker. The patient's son notes that the patient has new facial and periorbital swelling.     Hospital Course:  Admitted to 5. Given IVF continuous infusion for rhabdomyolysis. PT/OT ordered. Stable for d/c home with HH and PCC f/u scheduled.     Past Medical History:   Diagnosis Date    *Atrial fibrillation     Allergy     Aortic sclerosis     BPH (benign prostatic hypertrophy)     CAD (coronary artery disease) 7/31/2012    Calcium nephrolithiasis     Chronic anticoagulation -  apixaban  11/7/2017    For AFib    Chronic atrial fibrillation 10/16/2014    CKD (chronic kidney disease), stage III 10/16/2014    Colon polyps     Coronary artery disease     CPAP (continuous positive airway pressure) dependence     Degenerative disc disease     Diabetes mellitus     Encephalopathy, toxic 6/19/2017    Erectile dysfunction 8/20/2012    Hiatal hernia     HLD (hyperlipidemia) 7/31/2012    Hyperlipidemia     Hyperprolactinemia 12/29/2014    Hypertension     Increased prolactin level 9/17/2012    39.9 (3-15 range)     Lumbar disc disease 7/31/2012    Male hypogonadism 9/12/2012    Obesity 7/31/2012    S/p Gastric Bypass Surgery     Peripheral vascular disease     Pituitary microadenoma 2/26/2013    Pneumonia, community acquired 6/18/2017    Proteinuria     Renal insufficiency     Renovascular hypertension 7/31/2012    Severe sepsis 6/19/2017    Sleep apnea     Type 2 diabetes mellitus with stage 3 chronic kidney disease, with long-term current use of insulin 7/31/2012    Venous insufficiency        Past Surgical History:   Procedure Laterality Date    CATARACT EXTRACTION, BILATERAL      CHOLECYSTECTOMY      GASTRIC BYPASS         Review of patient's allergies indicates:  No Known Allergies    No current facility-administered medications on file prior to encounter.      Current Outpatient Prescriptions on File Prior to Encounter   Medication Sig    apixaban 2.5 mg Tab Take 1 tablet (2.5 mg total) by mouth 2 (two) times daily.    blood sugar diagnostic Strp Check glucose 2-3x/day before meals and/or bedtime (Patient taking differently: Check glucose 1-2 times daily)    COREG CR 20 mg 24 hr capsule Take 20 mg by mouth once daily.     ferrous sulfate dried (SLOW FE) 160 mg (50 mg iron) TbSR Take 1 tablet (160 mg total) by mouth once daily.    furosemide (LASIX) 40 MG tablet Take 1 tablet (40 mg total) by mouth once daily.    gabapentin (NEURONTIN) 300 MG capsule TAKE 1  CAPSULE BY MOUTH AT BEDTIME FOR DIABETIC NEUROPATHY    insulin glargine (LANTUS) 100 unit/mL injection Inject 50 Units into the skin once daily. 50 units Subcutaneous Every morning.  Pt needs 90 days of lantus-takes 50 units QHS (Patient taking differently: Inject 50 Units into the skin every evening. )    lancets (ACCU-CHEK SOFTCLIX LANCETS) Misc 1 strip by Misc.(Non-Drug; Combo Route) route 2 (two) times daily. (Patient taking differently: 1 strip by Misc.(Non-Drug; Combo Route) route once daily. )    lansoprazole (PREVACID SOLUTAB) 30 MG disintegrating tablet Take 1 tablet (30 mg total) by mouth once daily. 1 Tablet,Rapid Dissolve, DR Sublingual Every day.  for acid reflux (Patient taking differently: Take 30 mg by mouth daily as needed (acid reflux). )    oxycodone (OXYCONTIN) 30 mg TR12 12 hr tablet Take 1 tablet (30 mg total) by mouth every 12 (twelve) hours. 1 tab Q12 hours as needed for Lumbar/Back pain (Patient taking differently: Take 30 mg by mouth daily as needed for Pain. )    SITagliptin (JANUVIA) 50 MG Tab Take 1 tablet (50 mg total) by mouth once daily. For Diabetes    tamsulosin (FLOMAX) 0.4 mg Cp24 Take 1 capsule (0.4 mg total) by mouth once daily. 1 Capsule, Sust. Release 24 hr Oral Every day.  for prostate (Patient taking differently: Take 0.4 mg by mouth once daily. )    timolol maleate 0.5% (TIMOPTIC) 0.5 % Drop Place 1 drop into both eyes once daily.     TRAVATAN Z 0.004 % Drop Place 1 drop into both eyes every evening.     [DISCONTINUED] ezetimibe-simvastatin 10-20 mg (VYTORIN) 10-20 mg per tablet TAKE 1 TABLET EVERY EVENING AT BEDTIME FOR CHOLESTEROL     Family History     None        Social History Main Topics    Smoking status: Former Smoker     Packs/day: 4.00     Years: 30.00     Quit date: 1/9/1990    Smokeless tobacco: Never Used      Comment: pt was up to 4 packs a day before.    Alcohol use No    Drug use: No    Sexual activity: Yes     Partners: Female     Review of  Systems   Constitutional: Negative for chills and fever.   HENT: Negative for congestion.    Eyes: Negative for visual disturbance.   Respiratory: Negative for cough and shortness of breath.    Cardiovascular: Negative for chest pain and palpitations.   Gastrointestinal: Negative for abdominal pain, nausea and vomiting.   Endocrine: Negative for polyphagia and polyuria.   Genitourinary: Negative for dysuria and hematuria.   Musculoskeletal: Negative for back pain.   Neurological: Negative for dizziness and light-headedness.   Psychiatric/Behavioral: Negative for agitation and behavioral problems.     Objective:     Vital Signs (Most Recent):  Temp: 98.1 °F (36.7 °C) (11/08/17 1130)  Pulse: 63 (11/08/17 1130)  Resp: 18 (11/08/17 1130)  BP: (!) 146/81 (11/08/17 1130)  SpO2: 99 % (11/08/17 1130) Vital Signs (24h Range):  Temp:  [96.8 °F (36 °C)-98.2 °F (36.8 °C)] 98.1 °F (36.7 °C)  Pulse:  [52-82] 63  Resp:  [12-18] 18  SpO2:  [97 %-99 %] 99 %  BP: (136-183)/(63-83) 146/81     Weight: 103 kg (227 lb)  Body mass index is 31.66 kg/m².    Physical Exam   Constitutional: He is oriented to person, place, and time. He appears well-developed and well-nourished. No distress.   HENT:   Head: Normocephalic and atraumatic.   Eyes: Pupils are equal, round, and reactive to light.   Neck: Normal range of motion. Neck supple.   Cardiovascular: Normal rate and normal heart sounds.    Pulmonary/Chest: Effort normal and breath sounds normal.   Abdominal: Soft. Bowel sounds are normal.   Musculoskeletal: Normal range of motion. He exhibits edema (chronic). He exhibits no tenderness.   Chronic venous stasis type changes with violaceous discoloration of pre-tibial locations/no edema    Neurological: He is alert and oriented to person, place, and time. No sensory deficit. He exhibits normal muscle tone.   Skin: Skin is warm and dry. He is not diaphoretic.        Significant Labs:   CBC:     Recent Labs  Lab 11/06/17  2234 11/07/17  0401  11/08/17  0516   WBC 8.88 9.19 7.33   HGB 9.8* 9.7* 9.4*   HCT 32.1* 32.9* 29.7*    157 159     CMP:     Recent Labs  Lab 11/06/17  2234 11/07/17  0401 11/08/17  0516    146* 142   K 5.3* 5.3* 4.5   * 118* 115*   CO2 20* 13* 21*   * 122* 134*   BUN 62* 57* 55*   CREATININE 1.8* 1.5* 1.6*   CALCIUM 8.9 8.7 8.6*   PROT 6.5 6.2 6.0   ALBUMIN 2.9* 2.7* 2.6*   BILITOT 1.8* 1.6* 1.1*   ALKPHOS 101 94 86   AST 27 22 15   ALT 31 26 18   ANIONGAP 10 15 6*   EGFRNONAA 34.5* 43.0* 39.8*     Urine Studies:     Recent Labs  Lab 11/07/17  0031   COLORU Yellow   APPEARANCEUA Clear   PHUR 5.0   SPECGRAV 1.015   PROTEINUA 2+*   GLUCUA Negative   KETONESU 1+*   BILIRUBINUA Negative   OCCULTUA 2+*   NITRITE Negative   UROBILINOGEN Negative   LEUKOCYTESUR Negative   RBCUA 6*   WBCUA 3   BACTERIA Occasional   SQUAMEPITHEL 1   HYALINECASTS 0       Significant Imaging: I have reviewed and interpreted all pertinent imaging results/findings within the past 24 hours.    Assessment/Plan:      * Traumatic rhabdomyolysis    - due to the fall and crawling for more than 24 hrs  - CPK >700  - IVF infusion  - daily CPK, improving  - will f/u with scheduled PCC, can repeat CPK at that time  - cont to hold statin        Weakness    - dehydration and poor intake  - PT/OT: recommend HH (pt refused any SNF or rehab)  - Home Safety Evaluation written on  orders                Stage 3 chronic kidney disease    - stable cret at baseline          Chronic atrial fibrillation    - Last echo 7/17: Normal left ventricular systolic function (EF 60-65%),Concentric remodeling,Wall motion abnormalities, Indeterminate LV diastolic function,Biatrial enlargement.   - Continue coreg and apixban.          Venous stasis of lower extremity    - no active changes.            CARMELO (obstructive sleep apnea)    - not on CPAP.          Benign prostatic hyperplasia    - Continue home flumax          Hyperlipidemia    - Hold statin due to high CPK           Essential hypertension    - PT was previously on losartan/HCZT, stopped by PCP on due to elevated crt. 7/17  - held Lasix 40 mg on admit  - upon d/c, restarted lasix and started losartan 100 mg daily (instead of combo losartan/HCTZ 100-12.5)          Type 2 diabetes mellitus with stage 3 chronic kidney disease, with long-term current use of insulin    - On insulin Lantus 50 U daily.  - POCT QID  - detemir 10 U and low does ISS, due to pt dehydration and poor oral intake.            VTE Risk Mitigation         Ordered     apixaban tablet 2.5 mg  2 times daily     Route:  Oral        11/07/17 0340     Low Risk of VTE  Once      11/07/17 0340              Laura Cardenas MD  Department of Hospital Medicine   [facility]

## 2017-11-08 NOTE — SUBJECTIVE & OBJECTIVE
Past Medical History:   Diagnosis Date    *Atrial fibrillation     Allergy     Aortic sclerosis     BPH (benign prostatic hypertrophy)     CAD (coronary artery disease) 7/31/2012    Calcium nephrolithiasis     Chronic anticoagulation - apixaban  11/7/2017    For AFib    Chronic atrial fibrillation 10/16/2014    CKD (chronic kidney disease), stage III 10/16/2014    Colon polyps     Coronary artery disease     CPAP (continuous positive airway pressure) dependence     Degenerative disc disease     Diabetes mellitus     Encephalopathy, toxic 6/19/2017    Erectile dysfunction 8/20/2012    Hiatal hernia     HLD (hyperlipidemia) 7/31/2012    Hyperlipidemia     Hyperprolactinemia 12/29/2014    Hypertension     Increased prolactin level 9/17/2012    39.9 (3-15 range)     Lumbar disc disease 7/31/2012    Male hypogonadism 9/12/2012    Obesity 7/31/2012    S/p Gastric Bypass Surgery     Peripheral vascular disease     Pituitary microadenoma 2/26/2013    Pneumonia, community acquired 6/18/2017    Proteinuria     Renal insufficiency     Renovascular hypertension 7/31/2012    Severe sepsis 6/19/2017    Sleep apnea     Type 2 diabetes mellitus with stage 3 chronic kidney disease, with long-term current use of insulin 7/31/2012    Venous insufficiency        Past Surgical History:   Procedure Laterality Date    CATARACT EXTRACTION, BILATERAL      CHOLECYSTECTOMY      GASTRIC BYPASS         Review of patient's allergies indicates:  No Known Allergies    No current facility-administered medications on file prior to encounter.      Current Outpatient Prescriptions on File Prior to Encounter   Medication Sig    apixaban 2.5 mg Tab Take 1 tablet (2.5 mg total) by mouth 2 (two) times daily.    blood sugar diagnostic Strp Check glucose 2-3x/day before meals and/or bedtime (Patient taking differently: Check glucose 1-2 times daily)    COREG CR 20 mg 24 hr capsule Take 20 mg by mouth once daily.      ferrous sulfate dried (SLOW FE) 160 mg (50 mg iron) TbSR Take 1 tablet (160 mg total) by mouth once daily.    furosemide (LASIX) 40 MG tablet Take 1 tablet (40 mg total) by mouth once daily.    gabapentin (NEURONTIN) 300 MG capsule TAKE 1 CAPSULE BY MOUTH AT BEDTIME FOR DIABETIC NEUROPATHY    insulin glargine (LANTUS) 100 unit/mL injection Inject 50 Units into the skin once daily. 50 units Subcutaneous Every morning.  Pt needs 90 days of lantus-takes 50 units QHS (Patient taking differently: Inject 50 Units into the skin every evening. )    lancets (ACCU-CHEK SOFTCLIX LANCETS) Misc 1 strip by Misc.(Non-Drug; Combo Route) route 2 (two) times daily. (Patient taking differently: 1 strip by Misc.(Non-Drug; Combo Route) route once daily. )    lansoprazole (PREVACID SOLUTAB) 30 MG disintegrating tablet Take 1 tablet (30 mg total) by mouth once daily. 1 Tablet,Rapid Dissolve, DR Sublingual Every day.  for acid reflux (Patient taking differently: Take 30 mg by mouth daily as needed (acid reflux). )    oxycodone (OXYCONTIN) 30 mg TR12 12 hr tablet Take 1 tablet (30 mg total) by mouth every 12 (twelve) hours. 1 tab Q12 hours as needed for Lumbar/Back pain (Patient taking differently: Take 30 mg by mouth daily as needed for Pain. )    SITagliptin (JANUVIA) 50 MG Tab Take 1 tablet (50 mg total) by mouth once daily. For Diabetes    tamsulosin (FLOMAX) 0.4 mg Cp24 Take 1 capsule (0.4 mg total) by mouth once daily. 1 Capsule, Sust. Release 24 hr Oral Every day.  for prostate (Patient taking differently: Take 0.4 mg by mouth once daily. )    timolol maleate 0.5% (TIMOPTIC) 0.5 % Drop Place 1 drop into both eyes once daily.     TRAVATAN Z 0.004 % Drop Place 1 drop into both eyes every evening.     [DISCONTINUED] ezetimibe-simvastatin 10-20 mg (VYTORIN) 10-20 mg per tablet TAKE 1 TABLET EVERY EVENING AT BEDTIME FOR CHOLESTEROL     Family History     None        Social History Main Topics    Smoking status: Former Smoker      Packs/day: 4.00     Years: 30.00     Quit date: 1/9/1990    Smokeless tobacco: Never Used      Comment: pt was up to 4 packs a day before.    Alcohol use No    Drug use: No    Sexual activity: Yes     Partners: Female     Review of Systems   Constitutional: Negative for chills and fever.   HENT: Negative for congestion.    Eyes: Negative for visual disturbance.   Respiratory: Negative for cough and shortness of breath.    Cardiovascular: Negative for chest pain and palpitations.   Gastrointestinal: Negative for abdominal pain, nausea and vomiting.   Endocrine: Negative for polyphagia and polyuria.   Genitourinary: Negative for dysuria and hematuria.   Musculoskeletal: Negative for back pain.   Neurological: Negative for dizziness and light-headedness.   Psychiatric/Behavioral: Negative for agitation and behavioral problems.     Objective:     Vital Signs (Most Recent):  Temp: 98.1 °F (36.7 °C) (11/08/17 1130)  Pulse: 63 (11/08/17 1130)  Resp: 18 (11/08/17 1130)  BP: (!) 146/81 (11/08/17 1130)  SpO2: 99 % (11/08/17 1130) Vital Signs (24h Range):  Temp:  [96.8 °F (36 °C)-98.2 °F (36.8 °C)] 98.1 °F (36.7 °C)  Pulse:  [52-82] 63  Resp:  [12-18] 18  SpO2:  [97 %-99 %] 99 %  BP: (136-183)/(63-83) 146/81     Weight: 103 kg (227 lb)  Body mass index is 31.66 kg/m².    Physical Exam   Constitutional: He is oriented to person, place, and time. He appears well-developed and well-nourished. No distress.   HENT:   Head: Normocephalic and atraumatic.   Eyes: Pupils are equal, round, and reactive to light.   Neck: Normal range of motion. Neck supple.   Cardiovascular: Normal rate and normal heart sounds.    Pulmonary/Chest: Effort normal and breath sounds normal.   Abdominal: Soft. Bowel sounds are normal.   Musculoskeletal: Normal range of motion. He exhibits edema (chronic). He exhibits no tenderness.   Chronic venous stasis type changes with violaceous discoloration of pre-tibial locations/no edema    Neurological: He  is alert and oriented to person, place, and time. No sensory deficit. He exhibits normal muscle tone.   Skin: Skin is warm and dry. He is not diaphoretic.        Significant Labs:   CBC:     Recent Labs  Lab 11/06/17 2234 11/07/17 0401 11/08/17  0516   WBC 8.88 9.19 7.33   HGB 9.8* 9.7* 9.4*   HCT 32.1* 32.9* 29.7*    157 159     CMP:     Recent Labs  Lab 11/06/17 2234 11/07/17 0401 11/08/17  0516    146* 142   K 5.3* 5.3* 4.5   * 118* 115*   CO2 20* 13* 21*   * 122* 134*   BUN 62* 57* 55*   CREATININE 1.8* 1.5* 1.6*   CALCIUM 8.9 8.7 8.6*   PROT 6.5 6.2 6.0   ALBUMIN 2.9* 2.7* 2.6*   BILITOT 1.8* 1.6* 1.1*   ALKPHOS 101 94 86   AST 27 22 15   ALT 31 26 18   ANIONGAP 10 15 6*   EGFRNONAA 34.5* 43.0* 39.8*     Urine Studies:     Recent Labs  Lab 11/07/17  0031   COLORU Yellow   APPEARANCEUA Clear   PHUR 5.0   SPECGRAV 1.015   PROTEINUA 2+*   GLUCUA Negative   KETONESU 1+*   BILIRUBINUA Negative   OCCULTUA 2+*   NITRITE Negative   UROBILINOGEN Negative   LEUKOCYTESUR Negative   RBCUA 6*   WBCUA 3   BACTERIA Occasional   SQUAMEPITHEL 1   HYALINECASTS 0       Significant Imaging: I have reviewed and interpreted all pertinent imaging results/findings within the past 24 hours.

## 2017-11-08 NOTE — ASSESSMENT & PLAN NOTE
- dehydration and poor intake  - PT/OT: recommend HH (pt refused any SNF or rehab)  - Home Safety Evaluation written on HH orders

## 2017-11-08 NOTE — PLAN OF CARE
Problem: Diabetes, Type 2 (Adult)  Goal: Signs and Symptoms of Listed Potential Problems Will be Absent, Minimized or Managed (Diabetes, Type 2)  Signs and symptoms of listed potential problems will be absent, minimized or managed by discharge/transition of care (reference Diabetes, Type 2 (Adult) CPG).   Outcome: Ongoing (interventions implemented as appropriate)  Monitored patient's blood sugar throughout the shift, insulin given as prescribed.  Patient is being discharged home and he will continue to monitor his blood sugar.    Problem: Fall Risk (Adult)  Goal: Identify Related Risk Factors and Signs and Symptoms  Related risk factors and signs and symptoms are identified upon initiation of Human Response Clinical Practice Guideline (CPG)   Outcome: Outcome(s) achieved Date Met: 11/08/17  Patient ambulates with assistance.  Goal: Absence of Falls  Patient will demonstrate the desired outcomes by discharge/transition of care.   Outcome: Outcome(s) achieved Date Met: 11/08/17  Patient has been free from falls this hospitalization, he is being discharged home.    Problem: Patient Care Overview  Goal: Plan of Care Review  Outcome: Outcome(s) achieved Date Met: 11/08/17  Discussed patient's plan of care with him, including medications given, he is being discharged home.

## 2017-11-08 NOTE — HOSPITAL COURSE
Admitted to [facility]. Given IVF continuous infusion for rhabdomyolysis. PT/OT ordered. Stable for d/c home with HH and PCC f/u scheduled.

## 2017-11-08 NOTE — ASSESSMENT & PLAN NOTE
- due to the fall and crawling for more than 24 hrs  - CPK >700  - IVF infusion  - daily CPK, improving  - will f/u with scheduled PCC, can repeat CPK at that time  - cont to hold statin

## 2017-11-08 NOTE — PLAN OF CARE
Problem: Patient Care Overview  Goal: Plan of Care Review  Outcome: Ongoing (interventions implemented as appropriate)  Patient AAOx4. Plan of care and all safety precautions maintained and discussed. Vital signs stable throughout the shift. Patient remains free from fall/injury. No acute events throughout the shift. Call bell in reach. Encouraged to call for assistance. Verbalized understanding. Will continue to monitor.

## 2017-11-08 NOTE — PROGRESS NOTES
STAFF PHYSICIAN NOTE                                   Attending Attestation for Rounds with Resident  I have reviewed and concur with the resident's history, physical, assessment, and plan.  I have personally interviewed and examined the patient at bedside.  See below for my evaluation and additional findings.                                   ________________________________________                                                   Admitted earlier by oncall team.  Fell at home and could not get up.  Has dehydration and rhabdomyolysis.  Plan: IVF hydration. Hold statin. PT/OT eval.    Current Problems List:  Active Hospital Problems    Diagnosis  POA    *Traumatic rhabdomyolysis [T79.6XXA]  Yes     Priority: 1 - High    Weakness [R53.1]  Yes     Priority: 2     Stage 3 chronic kidney disease [N18.3]  Yes     Priority: 5     Essential hypertension [I10]  Yes     Priority: 5     Hyperlipidemia [E78.5]  Yes     Priority: 6      Chronic    Type 2 diabetes mellitus with stage 3 chronic kidney disease, with long-term current use of insulin [E11.22, N18.3, Z79.4]  Not Applicable     Priority: 7     Chronic anticoagulation - apixaban  [Z79.01]  Not Applicable     Priority: 8      Chronic     For AFib      Chronic atrial fibrillation [I48.2]  Yes     Priority: 8     CARMELO (obstructive sleep apnea) [G47.33]  Yes     Priority: 18     Venous stasis of lower extremity [I87.8]  Yes     Priority: 18     Benign prostatic hyperplasia [N40.0]  Yes     Priority: 19      Chronic      Resolved Hospital Problems    Diagnosis Date Resolved POA   No resolved problems to display.       Signing Physician:  Francisco Schafer MD

## 2017-11-08 NOTE — PLAN OF CARE
CM in to see pt AA sitting on side of bed with no family present in room and pt in no distress.  CM discussed with pt OT recommendations of home with HH and PT recommending short-term SNF for therapy.  Pt states he absolutely doesn't want to go inpatient in SNF he will only do HH with IM5 team aware.  CM discussed with pt IM5 request of PCC appt to be scheduled and reason with pt verbalizing understanding and ok with coming to appt.  CM scheduled PCC appt with pt aware.  CM awaiting HH orders to be placed and will send referral in area.  Pt aware of possible discharge today states he can call a friend to get ride home.    12:08PM  HH orders obtained, CM uploaded in [employer] and sent referral to [facility] pending acceptance with Miriam aware with agency.

## 2017-11-08 NOTE — ASSESSMENT & PLAN NOTE
- PT was previously on losartan/HCZT, stopped by PCP on due to elevated crt. 7/17  - held Lasix 40 mg on admit  - upon d/c, restarted lasix and started losartan 100 mg daily (instead of combo losartan/HCTZ 100-12.5)

## 2017-11-09 NOTE — PT/OT/SLP DISCHARGE
Physical Therapy Discharge Summary    Joseph Valerio  MRN: [id number]   Traumatic rhabdomyolysis   Patient Discharged from acute Physical Therapy on 17.  Please refer to prior PT noted date on 2017 for functional status.     Assessment:   Patient appropriate for care in another setting.  GOALS:    Physical Therapy Goals        Problem: Physical Therapy Goal    Goal Priority Disciplines Outcome Goal Variances Interventions   Physical Therapy Goal     PT/OT, PT Ongoing (interventions implemented as appropriate)     Description:  Goals to be met by: 2017     Patient will increase functional independence with mobility by performin. Supine to sit with Modified [place]  2. Sit to supine with Modified [place]  3. Sit to stand transfer with Contact Guard Assistance  4. Bed to chair transfer with Contact Guard Assistance using Rolling Walker  5. Gait  x 150 feet with Contact Guard Assistance using Rolling Walker.                     Reasons for Discontinuation of Therapy Services  Transfer to alternate level of care.      Plan:  Patient Discharged to: PT recommends SNF; however, patient DC'd home with HHPT.    Олег Isidro III, DPT, PT  2017

## 2017-11-10 ENCOUNTER — HOSPITAL ENCOUNTER (EMERGENCY)
Facility: HOSPITAL | Age: 82
Discharge: HOME | End: 2017-11-10
Attending: FAMILY MEDICINE
Payer: MEDICARE

## 2017-11-10 VITALS
WEIGHT: 230 LBS | DIASTOLIC BLOOD PRESSURE: 70 MMHG | RESPIRATION RATE: 18 BRPM | HEART RATE: 76 BPM | SYSTOLIC BLOOD PRESSURE: 157 MMHG | BODY MASS INDEX: 32.08 KG/M2 | TEMPERATURE: 98 F | OXYGEN SATURATION: 99 %

## 2017-11-10 DIAGNOSIS — R07.81 RIB PAIN: ICD-10-CM

## 2017-11-10 DIAGNOSIS — H10.9 CONJUNCTIVITIS OF BOTH EYES, UNSPECIFIED CONJUNCTIVITIS TYPE: ICD-10-CM

## 2017-11-10 DIAGNOSIS — F03.90 DEMENTIA WITHOUT BEHAVIORAL DISTURBANCE, UNSPECIFIED DEMENTIA TYPE: Primary | ICD-10-CM

## 2017-11-10 DIAGNOSIS — R41.82 ALTERED MENTAL STATE: ICD-10-CM

## 2017-11-10 LAB
ALBUMIN SERPL BCP-MCNC: 3.4 G/DL
ALP SERPL-CCNC: 76 U/L
ALT SERPL W/O P-5'-P-CCNC: 32 U/L
ANION GAP SERPL CALC-SCNC: 11 MMOL/L
AST SERPL-CCNC: 15 U/L
BASOPHILS # BLD AUTO: 0.03 K/UL
BASOPHILS NFR BLD: 0.5 %
BILIRUB SERPL-MCNC: 1.1 MG/DL
BILIRUB UR QL STRIP: NEGATIVE
BUN SERPL-MCNC: 48 MG/DL
CALCIUM SERPL-MCNC: 8.6 MG/DL
CHLORIDE SERPL-SCNC: 113 MMOL/L
CLARITY UR REFRACT.AUTO: CLEAR
CO2 SERPL-SCNC: 22 MMOL/L
COLOR UR AUTO: NORMAL
CREAT SERPL-MCNC: 1.89 MG/DL
DIFFERENTIAL METHOD: ABNORMAL
EOSINOPHIL # BLD AUTO: 0.1 K/UL
EOSINOPHIL NFR BLD: 1.1 %
ERYTHROCYTE [DISTWIDTH] IN BLOOD BY AUTOMATED COUNT: 13.8 %
EST. GFR  (AFRICAN AMERICAN): 37.6 ML/MIN/1.73 M^2
EST. GFR  (NON AFRICAN AMERICAN): 32.5 ML/MIN/1.73 M^2
GLUCOSE SERPL-MCNC: 125 MG/DL
GLUCOSE UR QL STRIP: NEGATIVE
HCT VFR BLD AUTO: 30.9 %
HGB BLD-MCNC: 9.4 G/DL
HGB UR QL STRIP: NEGATIVE
KETONES UR QL STRIP: NEGATIVE
LEUKOCYTE ESTERASE UR QL STRIP: NEGATIVE
LYMPHOCYTES # BLD AUTO: 1.5 K/UL
LYMPHOCYTES NFR BLD: 22.4 %
MCH RBC QN AUTO: 28 PG
MCHC RBC AUTO-ENTMCNC: 30.4 G/DL
MCV RBC AUTO: 92 FL
MONOCYTES # BLD AUTO: 0.8 K/UL
MONOCYTES NFR BLD: 11.7 %
NEUTROPHILS # BLD AUTO: 4.3 K/UL
NEUTROPHILS NFR BLD: 63.8 %
NITRITE UR QL STRIP: NEGATIVE
PH UR STRIP: 5 [PH] (ref 5–8)
PLATELET # BLD AUTO: 177 K/UL
PMV BLD AUTO: 10.9 FL
POTASSIUM SERPL-SCNC: 4.4 MMOL/L
PROT SERPL-MCNC: 6.6 G/DL
PROT UR QL STRIP: NEGATIVE
RBC # BLD AUTO: 3.36 M/UL
SODIUM SERPL-SCNC: 146 MMOL/L
SP GR UR STRIP: 1.01 (ref 1–1.03)
TROPONIN I SERPL DL<=0.01 NG/ML-MCNC: <0.012 NG/ML
URN SPEC COLLECT METH UR: NORMAL
UROBILINOGEN UR STRIP-ACNC: NEGATIVE EU/DL
WBC # BLD AUTO: 6.66 K/UL

## 2017-11-10 PROCEDURE — 85025 COMPLETE CBC W/AUTO DIFF WBC: CPT

## 2017-11-10 PROCEDURE — 25000003 PHARM REV CODE 250: Performed by: FAMILY MEDICINE

## 2017-11-10 PROCEDURE — 80053 COMPREHEN METABOLIC PANEL: CPT

## 2017-11-10 PROCEDURE — 93010 ELECTROCARDIOGRAM REPORT: CPT | Mod: ,,, | Performed by: INTERNAL MEDICINE

## 2017-11-10 PROCEDURE — 81003 URINALYSIS AUTO W/O SCOPE: CPT

## 2017-11-10 PROCEDURE — 99284 EMERGENCY DEPT VISIT MOD MDM: CPT

## 2017-11-10 PROCEDURE — 82962 GLUCOSE BLOOD TEST: CPT

## 2017-11-10 PROCEDURE — 93005 ELECTROCARDIOGRAM TRACING: CPT

## 2017-11-10 PROCEDURE — 84484 ASSAY OF TROPONIN QUANT: CPT

## 2017-11-10 RX ORDER — GENTAMICIN SULFATE 3 MG/ML
2 SOLUTION/ DROPS OPHTHALMIC
Status: COMPLETED | OUTPATIENT
Start: 2017-11-10 | End: 2017-11-10

## 2017-11-10 RX ORDER — GENTAMICIN SULFATE 3 MG/ML
2 SOLUTION/ DROPS OPHTHALMIC 4 TIMES DAILY
Qty: 15 ML | Refills: 0 | Status: SHIPPED | OUTPATIENT
Start: 2017-11-10 | End: 2017-12-26

## 2017-11-10 RX ADMIN — GENTAMICIN SULFATE 2 DROP: 3 SOLUTION/ DROPS OPHTHALMIC at 09:11

## 2017-11-11 LAB — POCT GLUCOSE: 135 MG/DL (ref 70–110)

## 2017-11-11 NOTE — ED PROVIDER NOTES
Encounter Date: 11/10/2017       History     Chief Complaint   Patient presents with    Altered Mental Status     AMS that was first noticed by son 2 hrs ago - pt alert, disoriented to place, pt reports rib pain and was recently d/c from hospital 2 days ago.      Mr. Valerio is a 82-year-old male lives by himself at his home has been having memory problems for a while which was temporary according to his son.  Today he brings Mr. Valerio for evaluation of altered mental status/memory problems.  He says his father usually take himself but today he is confused and unable to remember and talk normally to him since last 2 hours.  Patient has been admitted to [facility].  Days ago for a fall and rhabdomyolysis where he laid on the floor for a day and a half.  Patient was discharged home and recommended for physical therapy.  Patient also has generalized weakness unable to ambulate by himself at home and take care of himself at home.      History provided by: son.     Review of patient's allergies indicates:  No Known Allergies  Past Medical History:   Diagnosis Date    *Atrial fibrillation     Allergy     Aortic sclerosis     BPH (benign prostatic hypertrophy)     CAD (coronary artery disease) 7/31/2012    Calcium nephrolithiasis     Chronic anticoagulation - apixaban  11/7/2017    For AFib    Chronic atrial fibrillation 10/16/2014    CKD (chronic kidney disease), stage III 10/16/2014    Colon polyps     Coronary artery disease     CPAP (continuous positive airway pressure) dependence     Degenerative disc disease     Diabetes mellitus     Encephalopathy, toxic 6/19/2017    Erectile dysfunction 8/20/2012    Hiatal hernia     HLD (hyperlipidemia) 7/31/2012    Hyperlipidemia     Hyperprolactinemia 12/29/2014    Hypertension     Increased prolactin level 9/17/2012    39.9 (3-15 range)     Lumbar disc disease 7/31/2012    Male hypogonadism 9/12/2012    Obesity 7/31/2012    S/p Gastric Bypass  Surgery     Peripheral vascular disease     Pituitary microadenoma 2/26/2013    Pneumonia, community acquired 6/18/2017    Proteinuria     Renal insufficiency     Renovascular hypertension 7/31/2012    Severe sepsis 6/19/2017    Sleep apnea     Type 2 diabetes mellitus with stage 3 chronic kidney disease, with long-term current use of insulin 7/31/2012    Venous insufficiency      Past Surgical History:   Procedure Laterality Date    CATARACT EXTRACTION, BILATERAL      CHOLECYSTECTOMY      GASTRIC BYPASS       History reviewed. No pertinent family history.  Social History   Substance Use Topics    Smoking status: Former Smoker     Packs/day: 4.00     Years: 30.00     Quit date: 1/9/1990    Smokeless tobacco: Never Used      Comment: pt was up to 4 packs a day before.    Alcohol use No     Review of Systems   Constitutional: Positive for activity change. Negative for appetite change, chills and fever.   HENT: Negative for congestion, ear pain and sore throat.    Eyes: Negative for pain and redness.   Respiratory: Negative for cough, shortness of breath and wheezing.    Cardiovascular: Negative for chest pain and palpitations.   Gastrointestinal: Negative for abdominal pain, diarrhea, nausea and vomiting.   Genitourinary: Negative for dysuria and flank pain.   Musculoskeletal: Negative for neck pain and neck stiffness.   Skin: Negative for rash.   Neurological: Positive for weakness. Negative for speech difficulty, light-headedness, numbness and headaches.   Psychiatric/Behavioral: Positive for confusion and decreased concentration.   All other systems reviewed and are negative.      Physical Exam     Initial Vitals [11/10/17 1719]   BP Pulse Resp Temp SpO2   (!) 149/67 90 16 98.5 °F (36.9 °C) 98 %      MAP       94.33         Physical Exam    Nursing note and vitals reviewed.  Constitutional: He appears well-developed and well-nourished.   HENT:   Head: Normocephalic.   Right Ear: External ear normal.    Left Ear: External ear normal.   Nose: Nose normal.   Mouth/Throat: Oropharynx is clear and moist.   Eyes: Conjunctivae, EOM and lids are normal. Pupils are equal, round, and reactive to light. Right eye exhibits discharge.   Neck: Normal range of motion. Neck supple.   Cardiovascular: Normal rate, regular rhythm, normal heart sounds and intact distal pulses.   Pulmonary/Chest: Breath sounds normal. No respiratory distress. He has no wheezes. He has no rhonchi. He has no rales. He exhibits tenderness.       Abdominal: Soft. Bowel sounds are normal. There is no tenderness. There is no guarding.   Musculoskeletal: Normal range of motion.   Neurological: He is alert. He has normal strength and normal reflexes. He displays normal reflexes. No cranial nerve deficit. GCS eye subscore is 4. GCS verbal subscore is 5. GCS motor subscore is 6.   Reflex Scores:       Tricep reflexes are 2+ on the right side and 2+ on the left side.       Bicep reflexes are 2+ on the right side and 2+ on the left side.       Brachioradialis reflexes are 2+ on the right side and 2+ on the left side.       Patellar reflexes are 2+ on the right side and 2+ on the left side.       Achilles reflexes are 2+ on the right side and 2+ on the left side.  Patient is oriented to place and person but not to date and time.  Has moderate dementia.  But able to remember his date of birth and remote history.   Skin: Skin is warm.         ED Course   Procedures  Labs Reviewed   CBC W/ AUTO DIFFERENTIAL   COMPREHENSIVE METABOLIC PANEL   URINALYSIS   TROPONIN I             Medical Decision Making:   Initial Assessment:   Patient is brought to ED by his son complaining of forgetfulness.  Patient had this kind of symptoms in the past but was transient and today a little more pronounced.  Patient also was discharged from the hospital for rhabdomyolysis and was unclothed for one half day.  According to son patient does have scattered but still lives by  himself.  Differential Diagnosis:   Altered mental status, confusion, UTI, CVA, dementia, delirium, medication side effects.  Clinical Tests:   Lab Tests: Ordered and Reviewed  Radiological Study: Ordered and Reviewed  Medical Tests: Ordered and Reviewed  ED Management:  Patient had good conversation with me in the ED.  His recent memory is weak but remote memory is intact.  There is no focal neurological deficits.  Patient has generalized weakness.  Son is aware about his memory issues in the past.  Had a negative CAT scan for stroke.  Also his blood to start to his baseline.  Son is discussed in detail about his home health conditions and advised to plan for a sitter all the time.  Today's going to take home and discuss with the family.  Advised to follow-up with the primary care physician for further management of his dementia may require neurology and medication.                   ED Course      Clinical Impression:   The primary encounter diagnosis was Dementia without behavioral disturbance, unspecified dementia type. Diagnoses of Altered mental state, Rib pain, and Conjunctivitis of both eyes, unspecified conjunctivitis type were also pertinent to this visit.    Disposition:   Disposition: Discharged  Condition: Easton Johnson MD  11/11/17 0248

## 2017-11-11 NOTE — ED TRIAGE NOTES
Pt presents to ED via wheelchair with son stating pt has been confused in the last 2 hours, states pt was confused knowing their location. Pt discharged from [facility] 2 days ago

## 2017-11-13 ENCOUNTER — HOSPITAL ENCOUNTER (EMERGENCY)
Facility: HOSPITAL | Age: 82
Discharge: HOME | End: 2017-11-13
Attending: FAMILY MEDICINE
Payer: MEDICARE

## 2017-11-13 VITALS
BODY MASS INDEX: 31.78 KG/M2 | HEIGHT: 71 IN | HEART RATE: 64 BPM | OXYGEN SATURATION: 99 % | RESPIRATION RATE: 16 BRPM | SYSTOLIC BLOOD PRESSURE: 125 MMHG | WEIGHT: 227 LBS | TEMPERATURE: 98 F | DIASTOLIC BLOOD PRESSURE: 61 MMHG

## 2017-11-13 DIAGNOSIS — N28.9 RENAL INSUFFICIENCY: ICD-10-CM

## 2017-11-13 DIAGNOSIS — E16.2 HYPOGLYCEMIA: Primary | ICD-10-CM

## 2017-11-13 LAB
ALBUMIN SERPL BCP-MCNC: 3.5 G/DL
ALP SERPL-CCNC: 76 U/L
ALT SERPL W/O P-5'-P-CCNC: 27 U/L
ANION GAP SERPL CALC-SCNC: 11 MMOL/L
AST SERPL-CCNC: 13 U/L
BASOPHILS # BLD AUTO: 0.02 K/UL
BASOPHILS NFR BLD: 0.2 %
BILIRUB SERPL-MCNC: 1 MG/DL
BUN SERPL-MCNC: 67 MG/DL
CALCIUM SERPL-MCNC: 8.4 MG/DL
CHLORIDE SERPL-SCNC: 108 MMOL/L
CO2 SERPL-SCNC: 24 MMOL/L
CREAT SERPL-MCNC: 2.89 MG/DL
DIFFERENTIAL METHOD: ABNORMAL
EOSINOPHIL # BLD AUTO: 0.1 K/UL
EOSINOPHIL NFR BLD: 0.9 %
ERYTHROCYTE [DISTWIDTH] IN BLOOD BY AUTOMATED COUNT: 13.7 %
EST. GFR  (AFRICAN AMERICAN): 22.5 ML/MIN/1.73 M^2
EST. GFR  (NON AFRICAN AMERICAN): 19.5 ML/MIN/1.73 M^2
GLUCOSE SERPL-MCNC: 131 MG/DL
HCT VFR BLD AUTO: 32.2 %
HGB BLD-MCNC: 9.8 G/DL
LYMPHOCYTES # BLD AUTO: 1.3 K/UL
LYMPHOCYTES NFR BLD: 14.6 %
MCH RBC QN AUTO: 27.8 PG
MCHC RBC AUTO-ENTMCNC: 30.4 G/DL
MCV RBC AUTO: 92 FL
MONOCYTES # BLD AUTO: 0.9 K/UL
MONOCYTES NFR BLD: 10.1 %
NEUTROPHILS # BLD AUTO: 6.6 K/UL
NEUTROPHILS NFR BLD: 73.9 %
PLATELET # BLD AUTO: 180 K/UL
PMV BLD AUTO: 11.5 FL
POCT GLUCOSE: 167 MG/DL (ref 70–110)
POTASSIUM SERPL-SCNC: 4.4 MMOL/L
PROT SERPL-MCNC: 6.8 G/DL
RBC # BLD AUTO: 3.52 M/UL
SODIUM SERPL-SCNC: 143 MMOL/L
WBC # BLD AUTO: 8.91 K/UL

## 2017-11-13 PROCEDURE — 99284 EMERGENCY DEPT VISIT MOD MDM: CPT | Mod: 25

## 2017-11-13 PROCEDURE — 80053 COMPREHEN METABOLIC PANEL: CPT

## 2017-11-13 PROCEDURE — 82962 GLUCOSE BLOOD TEST: CPT

## 2017-11-13 PROCEDURE — 96360 HYDRATION IV INFUSION INIT: CPT

## 2017-11-13 PROCEDURE — 85025 COMPLETE CBC W/AUTO DIFF WBC: CPT

## 2017-11-13 PROCEDURE — 25000003 PHARM REV CODE 250: Performed by: FAMILY MEDICINE

## 2017-11-13 RX ORDER — SODIUM CHLORIDE 9 MG/ML
1000 INJECTION, SOLUTION INTRAVENOUS
Status: COMPLETED | OUTPATIENT
Start: 2017-11-13 | End: 2017-11-13

## 2017-11-13 RX ADMIN — SODIUM CHLORIDE 1000 ML: 0.9 INJECTION, SOLUTION INTRAVENOUS at 09:11

## 2017-11-14 ENCOUNTER — TELEPHONE (OUTPATIENT)
Dept: INTERNAL MEDICINE | Facility: CLINIC | Age: 82
End: 2017-11-14
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
## 2017-11-14 NOTE — DISCHARGE INSTRUCTIONS
Takes 25 units of insulin glargine every morning until otherwise instructed by primary care physician secondary to patient's renal insufficiency

## 2017-11-14 NOTE — ED NOTES
Patient identifiers for Joseph Valerio verified.     APPEARANCE: Pt clean and well groomed with clothes appropriately fastened. No distress is noted.  NEURO: Pt AAOX4, Pt follows commands and affect is appropriate. Pt is moving all extremities well and sensation is intact. Speech is clear.  RESPIRATORY: Respirations are even and unlabored on room air. No accessory muscle use noted. Normal rate and effort is noted. Pt placed on continuous pulse ox with O2 sats noted at 100%.   CARDIAC: Pt placed on cardiac monitor. Sinus rhythm noted. Rate is normal. No abnormal heart sounds noted. Radial and dorsalis pedis pulses are palpable and +2. No edema noted. Cap refill is <3sec.   GASTRO: Pt denies abdominal pain/tenderness. States bowel movements have been regular. Bowel sounds are present and normal.   : Pt denies any pain or frequency with urination.  SKIN: Skin is warm, dry and intact. Skin color is appropriate for ethnicity. Normal skin turgor noted. Mucous membranes are moist.

## 2017-11-14 NOTE — TELEPHONE ENCOUNTER
----- Message from Maricarmen Jacob sent at 11/14/2017  2:32 PM CST -----  Contact: Mona/ Home Health Nurse/ [phone]   Type: Sooner appointment than  is able to schedule    When is the first available appointment? 12/20/2017    What is the nature of the appointment? Hospital follow up     What appointment type:      Comments: Mona is calling to have a sooner appt. Please call and advise     Thank you

## 2017-11-14 NOTE — ED NOTES
Pt awake and alert, resp e/u. Cardiac monitor remains in place. VS stable. Pt updated on current status. Pt denies further needs at this time.

## 2017-11-15 NOTE — TELEPHONE ENCOUNTER
Patient has a follow up appointment with Dr. Schafer on 11-17-17 @ 4:00 pm.  Spoke with Mona with [facility], states patient can not make it to that appointment because of transportation, she states son can bring tomorrow, M, T, W next week.

## 2017-11-17 NOTE — PT/OT/SLP DISCHARGE
Occupational Therapy Discharge Summary    Joseph Valerio  MRN: [id number]   Traumatic rhabdomyolysis   Patient Discharged from acute Occupational Therapy on 11/8/17.  Please refer to prior OT note dated on 11/7/17 for functional status.     Assessment:   Patient appropriate for care in another setting.  GOALS:    Occupational Therapy Goals        Problem: Occupational Therapy Goal    Goal Priority Disciplines Outcome Interventions   Occupational Therapy Goal     OT, PT/OT Ongoing (interventions implemented as appropriate)    Description:  Goals to be met by: 11/14/17    Patient will increase functional independence with ADLs by performing:    LE Dressing with Stand-by Assistance and Assistive Devices as needed.  Grooming while standing at sink with Supervision.  Toileting from toilet with Supervision for hygiene and clothing management.   Supine to sit with [place].  Stand pivot transfers with Supervision.  Toilet transfer to toilet with Stand-by Assistance.                    Reasons for Discontinuation of Therapy Services  Transfer to alternate level of care.      Plan:  Patient Discharged to:  OT recommends SNF; however, patient DC'd home with HHPT..

## 2017-11-20 ENCOUNTER — OFFICE VISIT (OUTPATIENT)
Dept: PRIMARY CARE CLINIC | Facility: CLINIC | Age: 82
End: 2017-11-20
Payer: MEDICARE

## 2017-11-20 ENCOUNTER — TELEPHONE (OUTPATIENT)
Dept: INTERNAL MEDICINE | Facility: CLINIC | Age: 82
End: 2017-11-20

## 2017-11-20 ENCOUNTER — HOSPITAL ENCOUNTER (OUTPATIENT)
Dept: RADIOLOGY | Facility: HOSPITAL | Age: 82
Discharge: HOME | End: 2017-11-20
Attending: NURSE PRACTITIONER
Payer: MEDICARE

## 2017-11-20 VITALS
SYSTOLIC BLOOD PRESSURE: 136 MMHG | WEIGHT: 227.31 LBS | BODY MASS INDEX: 31.82 KG/M2 | HEART RATE: 70 BPM | DIASTOLIC BLOOD PRESSURE: 64 MMHG | OXYGEN SATURATION: 99 % | HEIGHT: 71 IN

## 2017-11-20 DIAGNOSIS — I48.20 CHRONIC ATRIAL FIBRILLATION: ICD-10-CM

## 2017-11-20 DIAGNOSIS — Z79.4 TYPE 2 DIABETES MELLITUS WITH STAGE 3 CHRONIC KIDNEY DISEASE, WITH LONG-TERM CURRENT USE OF INSULIN: ICD-10-CM

## 2017-11-20 DIAGNOSIS — Z79.01 CHRONIC ANTICOAGULATION: Chronic | ICD-10-CM

## 2017-11-20 DIAGNOSIS — N18.30 TYPE 2 DIABETES MELLITUS WITH STAGE 3 CHRONIC KIDNEY DISEASE, WITH LONG-TERM CURRENT USE OF INSULIN: ICD-10-CM

## 2017-11-20 DIAGNOSIS — E78.2 MIXED HYPERLIPIDEMIA: Chronic | ICD-10-CM

## 2017-11-20 DIAGNOSIS — I25.10 CORONARY ARTERY DISEASE INVOLVING NATIVE CORONARY ARTERY WITHOUT ANGINA PECTORIS, UNSPECIFIED WHETHER NATIVE OR TRANSPLANTED HEART: ICD-10-CM

## 2017-11-20 DIAGNOSIS — N18.30 STAGE 3 CHRONIC KIDNEY DISEASE: ICD-10-CM

## 2017-11-20 DIAGNOSIS — N18.2 TYPE 2 DIABETES MELLITUS WITH STAGE 2 CHRONIC KIDNEY DISEASE, UNSPECIFIED LONG TERM INSULIN USE STATUS: ICD-10-CM

## 2017-11-20 DIAGNOSIS — E11.22 TYPE 2 DIABETES MELLITUS WITH STAGE 2 CHRONIC KIDNEY DISEASE, UNSPECIFIED LONG TERM INSULIN USE STATUS: ICD-10-CM

## 2017-11-20 DIAGNOSIS — N17.9 AKI (ACUTE KIDNEY INJURY): ICD-10-CM

## 2017-11-20 DIAGNOSIS — N40.0 BENIGN PROSTATIC HYPERPLASIA WITHOUT LOWER URINARY TRACT SYMPTOMS: Chronic | ICD-10-CM

## 2017-11-20 DIAGNOSIS — T79.6XXA TRAUMATIC RHABDOMYOLYSIS, INITIAL ENCOUNTER: ICD-10-CM

## 2017-11-20 DIAGNOSIS — J18.9 PNEUMONIA OF RIGHT LUNG DUE TO INFECTIOUS ORGANISM, UNSPECIFIED PART OF LUNG: Primary | ICD-10-CM

## 2017-11-20 DIAGNOSIS — E11.22 TYPE 2 DIABETES MELLITUS WITH STAGE 3 CHRONIC KIDNEY DISEASE, WITH LONG-TERM CURRENT USE OF INSULIN: ICD-10-CM

## 2017-11-20 DIAGNOSIS — T79.6XXA TRAUMATIC RHABDOMYOLYSIS, INITIAL ENCOUNTER: Primary | ICD-10-CM

## 2017-11-20 LAB — GLUCOSE SERPL-MCNC: 102 MG/DL (ref 70–110)

## 2017-11-20 PROCEDURE — 99214 OFFICE O/P EST MOD 30 MIN: CPT | Mod: S$PBB,,, | Performed by: NURSE PRACTITIONER

## 2017-11-20 PROCEDURE — 99999 PR PBB SHADOW E&M-EST. PATIENT-LVL V: CPT | Mod: PBBFAC,,, | Performed by: NURSE PRACTITIONER

## 2017-11-20 PROCEDURE — 99215 OFFICE O/P EST HI 40 MIN: CPT | Mod: PBBFAC,25 | Performed by: NURSE PRACTITIONER

## 2017-11-20 PROCEDURE — 71111 X-RAY EXAM RIBS/CHEST4/> VWS: CPT | Mod: 26,,, | Performed by: RADIOLOGY

## 2017-11-20 PROCEDURE — 82948 REAGENT STRIP/BLOOD GLUCOSE: CPT | Mod: PBBFAC | Performed by: NURSE PRACTITIONER

## 2017-11-20 PROCEDURE — 71111 X-RAY EXAM RIBS/CHEST4/> VWS: CPT | Mod: TC

## 2017-11-20 RX ORDER — TRAMADOL HYDROCHLORIDE 50 MG/1
50 TABLET ORAL EVERY 8 HOURS PRN
Qty: 20 TABLET | Refills: 0 | Status: SHIPPED | OUTPATIENT
Start: 2017-11-20 | End: 2017-11-30

## 2017-11-20 RX ORDER — MULTIVITAMIN
1 TABLET ORAL DAILY
COMMUNITY
Start: 2017-11-20

## 2017-11-20 RX ORDER — ACETAMINOPHEN 500 MG
1000 TABLET ORAL EVERY 8 HOURS PRN
Refills: 0 | Status: ON HOLD | COMMUNITY
Start: 2017-11-20 | End: 2018-01-10 | Stop reason: HOSPADM

## 2017-11-20 RX ORDER — INSULIN GLARGINE 100 [IU]/ML
25 INJECTION, SOLUTION SUBCUTANEOUS DAILY
Qty: 6 VIAL | Refills: 4
Start: 2017-11-20 | End: 2017-12-20

## 2017-11-20 NOTE — PROGRESS NOTES
[facility]  Follow-up Visit Progress Note     PRESENTING HISTORY     PCP: Thompson Stiles MD  Chief Complaint/Reason for Visit:  Follow up from recent visit.      No chief complaint on file.      History of Present Illness & ROS: Mr. Joseph Valerio is a 82 y.o. male.    DISCHARGE SUMMARY  Hospital Medicine     [facility]: [facility]     Patient Name: Joseph Valerio  YOB: 1935     Admit Date: 11/6/2017     Discharge Date: 11/08/2017      Discharge Attending Physician: Francisco Schafer MD     Resident on Service: Laura Cardenas DO     Chief Complaint: Traumatic rhabdomyolysis     Princilpal Diagnoses:         Active Hospital Problems     Diagnosis   POA    *Traumatic rhabdomyolysis [T79.6XXA]   Yes    Stage 3 chronic kidney disease [N18.3]   Yes    Weakness [R53.1]   Yes    Chronic anticoagulation - apixaban  [Z79.01]   Not Applicable       Chronic       For AFib    Chronic atrial fibrillation [I48.2]   Yes    Type 2 diabetes mellitus with stage 3 chronic kidney disease, with long-term current use of insulin [E11.22, N18.3, Z79.4]   Not Applicable    Hyperlipidemia [E78.5]   Yes       Chronic    Benign prostatic hyperplasia [N40.0]   Yes       Chronic    CARMELO (obstructive sleep apnea) [G47.33]   Yes    Venous stasis of lower extremity [I87.8]   Yes    Essential hypertension [I10]   Yes       Resolved Hospital Problems     Diagnosis Date Resolved POA   No resolved problems to display.         Discharged Condition: Admit problems have resolved      HOSPITAL COURSE:       Initial Presentation:     This is a 82 y.o. male with a PMHx of HTN, DM, HLD, CAD, degenerative disc disease,PVD, atrial fibrillation on apixaban, CARMELO and CKD who presents to the ED s/p fall yesterday.      Patient reports that he slipped from the chair couldn't get up , started crawling around the house to get to a phone but was unable to do so as all the phones were not working, he had no access to the kitchen to  have water or food for 2 days. He lives alone. Patient denies chest pain prior to fall, striking his head or losing consciousness. Additionally denies neck pain, pain in upper extremities.      Per the patient's son, he was on the floor for a day and a half. The patient's son states that his diabetic neuropathy has been getting progressively worse over the last few weeks, and the patient has been complaining over increased pain and weakness in his lower extremities. He was too weak to pull himself up after he fell and he is too weak to walk at the current time. The patient reports that he occasionally uses a cane at home, and he also has a walker. The patient's son notes that the patient has new facial and periorbital swelling.      Course of Principle Problem for Admission:     Admitted to 5. Given IVF continuous infusion for rhabdomyolysis. PT/OT ordered. Stable for d/c home with HH and PCC f/u scheduled.      Medical Problems Addressed in the Hospital:         Traumatic rhabdomyolysis     - due to the fall and crawling for more than 24 hrs  - CPK >700  - IVF infusion  - daily CPK, improving  - will f/u with scheduled PCC, can repeat CPK at that time  - cont to hold statin       Weakness     - dehydration and poor intake  - PT/OT: recommend HH (pt refused any SNF or rehab)  - Home Safety Evaluation written on HH orders          Stage 3 chronic kidney disease     - stable cret at baseline          Chronic atrial fibrillation     - Last echo 7/17: Normal left ventricular systolic function (EF 60-65%),Concentric remodeling,Wall motion abnormalities, Indeterminate LV diastolic function,Biatrial enlargement.   - Continue coreg and apixban.          Venous stasis of lower extremity     - no active changes.             CARMELO (obstructive sleep apnea)     - not on CPAP.          Benign prostatic hyperplasia     - Continue home flumax          Hyperlipidemia     - Hold statin due to high CPK          Essential hypertension      - PT was previously on losartan/HCZT, stopped by PCP on due to elevated crt. 7/17  - held Lasix 40 mg on admit  - upon d/c, restarted lasix and started losartan 100 mg daily (instead of combo losartan/HCTZ 100-12.5)          Type 2 diabetes mellitus with stage 3 chronic kidney disease, with long-term current use of insulin     - On insulin Lantus 50 U daily.  - POCT QID  - detemir 10 U and low does ISS, due to pt dehydration and poor oral intake.            CONSULTS: n/a     Last CBC/BMP/HgbA1c (if applicable):        Recent Results (from the past 336 hour(s))   CBC with Automated Differential     Collection Time: 11/08/17  5:16 AM   Result Value Ref Range     WBC 7.33 3.90 - 12.70 K/uL     Hemoglobin 9.4 (L) 14.0 - 18.0 g/dL     Hematocrit 29.7 (L) 40.0 - 54.0 %     Platelets 159 150 - 350 K/uL   CBC with Automated Differential     Collection Time: 11/07/17  4:01 AM   Result Value Ref Range     WBC 9.19 3.90 - 12.70 K/uL     Hemoglobin 9.7 (L) 14.0 - 18.0 g/dL     Hematocrit 32.9 (L) 40.0 - 54.0 %     Platelets 157 150 - 350 K/uL   CBC auto differential     Collection Time: 11/06/17 10:34 PM   Result Value Ref Range     WBC 8.88 3.90 - 12.70 K/uL     Hemoglobin 9.8 (L) 14.0 - 18.0 g/dL     Hematocrit 32.1 (L) 40.0 - 54.0 %     Platelets 169 150 - 350 K/uL      No results found for this or any previous visit (from the past 336 hour(s)).        Lab Results   Component Value Date     HGBA1C 6.0 (H) 08/23/2017         Other Pertinent Lab Findings:   --> 675        Disposition:  Home with Home Health           Future Scheduled Appointments:  Future Appointments  Date Time Provider Department Center   11/17/2017 4:00 PM Francisco Schafer MD [facility]   12/13/2017 8:15 AM LAB, [facility] [place]   12/20/2017 8:00 AM Thompson Stiles MD [facility]            Discharge Medication List:                      Joseph Valerio   Home Medication Instructions KARI:[id number]     Printed  on:11/08/17 2124   Medication Information                                       apixaban 2.5 mg Tab  Take 1 tablet (2.5 mg total) by mouth 2 (two) times daily.                   blood sugar diagnostic Strp  Check glucose 2-3x/day before meals and/or bedtime                   COREG CR 20 mg 24 hr capsule  Take 20 mg by mouth once daily.                    ferrous sulfate dried (SLOW FE) 160 mg (50 mg iron) TbSR  Take 1 tablet (160 mg total) by mouth once daily.                   furosemide (LASIX) 40 MG tablet  Take 1 tablet (40 mg total) by mouth once daily.                   gabapentin (NEURONTIN) 300 MG capsule  TAKE 1 CAPSULE BY MOUTH AT BEDTIME FOR DIABETIC NEUROPATHY                   insulin glargine (LANTUS) 100 unit/mL injection  Inject 50 Units into the skin once daily. 50 units Subcutaneous Every morning.  Pt needs 90 days of lantus-takes 50 units QHS                   lancets (ACCU-CHEK SOFTCLIX LANCETS) Misc  1 strip by Misc.(Non-Drug; Combo Route) route 2 (two) times daily.                   lansoprazole (PREVACID SOLUTAB) 30 MG disintegrating tablet  Take 1 tablet (30 mg total) by mouth once daily. 1 Tablet,Rapid Dissolve, DR Sublingual Every day.  for acid reflux                   losartan (COZAAR) 100 MG tablet  Take 1 tablet (100 mg total) by mouth once daily.                   oxycodone (OXYCONTIN) 30 mg TR12 12 hr tablet  Take 1 tablet (30 mg total) by mouth every 12 (twelve) hours. 1 tab Q12 hours as needed for Lumbar/Back pain                   SITagliptin (JANUVIA) 50 MG Tab  Take 1 tablet (50 mg total) by mouth once daily. For Diabetes                   tamsulosin (FLOMAX) 0.4 mg Cp24  Take 1 capsule (0.4 mg total) by mouth once daily. 1 Capsule, Sust. Release 24 hr Oral Every day.  for prostate                   timolol maleate 0.5% (TIMOPTIC) 0.5 % Drop  Place 1 drop into both eyes once daily.                    TRAVATAN Z 0.004 % Drop  Place 1 drop into both eyes every evening.               "            Patient Instructions:     Discharge Procedure Orders  Diet Diabetic 2000 Calories      Diet Low Sodium, 2gm      Activity as tolerated      Call MD for:  temperature >100.4      Call MD for:  persistent nausea and vomiting or diarrhea      Call MD for:  severe uncontrolled pain      Call MD for:  difficulty breathing or increased cough      Call MD for:  severe persistent headache      Call MD for:  persistent dizziness, light-headedness, or visual disturbances      Call MD for:  increased confusion or weakness             Signing Physician:  Laura Cardenas MD      Electronically signed by Laura Cardenas MD at 11/8/2017  1:56 PM  Electronically signed by Francisco Schafer MD at 11/8/2017  5:13 PM          Today:   Mr. Ruiz presents to  today, accompanied by his son, Jr Joseph. Son with voiced concern in regards to his father having "ups and downs since discharge with physical strength and his mind". Patient resides alone, but "interested in 'assisted living'. Patient is able to appropriately engage in conversation. Complaining of "pain beneath right rib cage since the fall".   No other complaints from the patient.  Son is 'wondering why not admitted to [facility] before being discharged; I couldn't get him from the hospital due to work, but wished I had gotten a call from [facility] before he was discharge'.   Son has POA.   His wife is employed as an RN with [employer].     Patient does not endorse repeated fall since discharge, denies fever, chills, sob, coughing, chest pain, dizziness, headaches or other discomforts.   At present, son feels "they have good outside support, but could use some extra".  Patient feels he is "drinking enough fluids, but appetite up and down". Of note, has not eaten this morning, but has taken the insulin before coming to clinic.       Review of Systems:  Eyes: denies visual changes at this time denies floaters   ENT: no nasal congestion or sore throat  Respiratory: no cough " or shorness of breath  Cardiovascular: no chest pain or palpitations  Gastrointestinal: no nausea or vomiting, no abdominal pain or change in bowel habits  Genitourinary: no hematuria or dysuria; denies frequency  Hematologic/Lymphatic: no easy bruising or lymphadenopathy  Neurological: no seizures or tremors  Endocrine: no heat or cold intolerance      PAST HISTORY:     Past Medical History:   Diagnosis Date    *Atrial fibrillation     Allergy     Aortic sclerosis     BPH (benign prostatic hypertrophy)     CAD (coronary artery disease) 7/31/2012    Calcium nephrolithiasis     Chronic allergic rhinitis 5/28/2013    Chronic anticoagulation - apixaban  11/7/2017    For AFib    Chronic atrial fibrillation 10/16/2014    CKD (chronic kidney disease), stage III 10/16/2014    Colon polyps     Coronary artery disease     CPAP (continuous positive airway pressure) dependence     Degenerative disc disease     Diabetes mellitus     Encephalopathy, toxic 6/19/2017    Erectile dysfunction 8/20/2012    Hiatal hernia     HLD (hyperlipidemia) 7/31/2012    Hyperlipidemia     Hyperprolactinemia 12/29/2014    Hypertension     Increased prolactin level 9/17/2012    39.9 (3-15 range)     Increased prolactin level 9/17/2012    39.9 (3-15 range)     Lumbar disc disease 7/31/2012    Male hypogonadism 9/12/2012    Male hypogonadism 9/12/2012    Obesity 7/31/2012    S/p Gastric Bypass Surgery     Peripheral vascular disease     Pituitary microadenoma 2/26/2013    Pituitary microadenoma 2/26/2013    Pneumonia, community acquired 6/18/2017    Proteinuria     Renal insufficiency     Renovascular hypertension 7/31/2012    Severe sepsis 6/19/2017    Sleep apnea     Type 2 diabetes mellitus with stage 3 chronic kidney disease, with long-term current use of insulin 7/31/2012    Venous insufficiency        Past Surgical History:   Procedure Laterality Date    CATARACT EXTRACTION, BILATERAL       CHOLECYSTECTOMY      GASTRIC BYPASS         No family history on file.    Social History     Social History    Marital status: Single     Spouse name: N/A    Number of children: N/A    Years of education: N/A     Social History Main Topics    Smoking status: Former Smoker     Packs/day: 4.00     Years: 30.00     Quit date: 1/9/1990    Smokeless tobacco: Never Used      Comment: pt was up to 4 packs a day before.    Alcohol use No    Drug use: No    Sexual activity: Yes     Partners: Female     Other Topics Concern    Not on file     Social History Narrative    No narrative on file       MEDICATIONS & ALLERGIES:     Current Outpatient Prescriptions on File Prior to Visit   Medication Sig Dispense Refill    apixaban 2.5 mg Tab Take 1 tablet (2.5 mg total) by mouth 2 (two) times daily. 60 tablet 11    blood sugar diagnostic Strp Check glucose 2-3x/day before meals and/or bedtime (Patient taking differently: Check glucose 1-2 times daily) 300 strip 3    COREG CR 20 mg 24 hr capsule Take 20 mg by mouth once daily.       ferrous sulfate dried (SLOW FE) 160 mg (50 mg iron) TbSR Take 1 tablet (160 mg total) by mouth once daily. 30 tablet 3    furosemide (LASIX) 40 MG tablet Take 1 tablet (40 mg total) by mouth once daily. 90 tablet 2    gabapentin (NEURONTIN) 300 MG capsule TAKE 1 CAPSULE BY MOUTH AT BEDTIME FOR DIABETIC NEUROPATHY 90 capsule 3    gentamicin (GARAMYCIN) 0.3 % ophthalmic solution Place 2 drops into both eyes 4 (four) times daily. For 7 days 15 mL 0    insulin glargine (LANTUS) 100 unit/mL injection Inject 50 Units into the skin once daily. 50 units Subcutaneous Every morning.  Pt needs 90 days of lantus-takes 50 units QHS (Patient taking differently: Inject 50 Units into the skin every evening. ) 6 vial 4    lancets (ACCU-CHEK SOFTCLIX LANCETS) Misc 1 strip by Misc.(Non-Drug; Combo Route) route 2 (two) times daily. (Patient taking differently: 1 strip by Misc.(Non-Drug; Combo Route) route  once daily. ) 180 each 3    lansoprazole (PREVACID SOLUTAB) 30 MG disintegrating tablet Take 1 tablet (30 mg total) by mouth once daily. 1 Tablet,Rapid Dissolve, DR Sublingual Every day.  for acid reflux (Patient taking differently: Take 30 mg by mouth daily as needed (acid reflux). ) 90 tablet 3    losartan (COZAAR) 100 MG tablet Take 1 tablet (100 mg total) by mouth once daily. 90 tablet 3    oxycodone (OXYCONTIN) 30 mg TR12 12 hr tablet Take 1 tablet (30 mg total) by mouth every 12 (twelve) hours. 1 tab Q12 hours as needed for Lumbar/Back pain (Patient taking differently: Take 30 mg by mouth daily as needed for Pain. ) 90 tablet 0    SITagliptin (JANUVIA) 50 MG Tab Take 1 tablet (50 mg total) by mouth once daily. For Diabetes 90 tablet 2    tamsulosin (FLOMAX) 0.4 mg Cp24 Take 1 capsule (0.4 mg total) by mouth once daily. 1 Capsule, Sust. Release 24 hr Oral Every day.  for prostate (Patient taking differently: Take 0.4 mg by mouth once daily. ) 90 capsule 3    timolol maleate 0.5% (TIMOPTIC) 0.5 % Drop Place 1 drop into both eyes once daily.       TRAVATAN Z 0.004 % Drop Place 1 drop into both eyes every evening.        No current facility-administered medications on file prior to visit.         Review of patient's allergies indicates:  No Known Allergies    Medications Reconciliation:   I have reconciled the patient's home medications and discharge medications with the patient/family. I have updated all changes.  Refer to After-Visit Medication List.    OBJECTIVE:     Vital Signs:  There were no vitals filed for this visit.  Wt Readings from Last 1 Encounters:   11/1935 103 kg (227 lb)     There is no height or weight on file to calculate BMI.     Wt Readings from Last 3 Encounters:   11/20/17 103.1 kg (227 lb 4.7 oz)   11/13/17 103 kg (227 lb)   11/10/17 104.3 kg (230 lb)     Temp Readings from Last 3 Encounters:   11/13/17 97.6 °F (36.4 °C)   11/10/17 97.7 °F (36.5 °C) (Oral)   11/08/17 98.1 °F  (36.7 °C) (Oral)     BP Readings from Last 3 Encounters:   11/20/17 136/64   11/13/17 125/61   11/10/17 (!) 157/70     Pulse Readings from Last 3 Encounters:   11/20/17 70   11/13/17 64   11/10/17 76         Physical Exam:  General: Well developed, well nourished. No distress.  HEENT: Head is normocephalic, atraumatic; ears are normal.   Eyes: Clear conjunctiva.  Neck: Supple, symmetrical neck; trachea midline.  Lungs: Clear to auscultation bilaterally and normal respiratory effort.  Cardiovascular: Heart with regular rate and rhythm. No murmurs, gallops or rubs  Extremities: +1 BLE edema. Pulses 2+ and symmetric.   Abdomen: Abdomen is soft, non-tender, distended with normal bowel sounds.  Skin: Skin color, texture, turgor normal. No rashes.  Musculoskeletal: palpable induced pain to inferior aspect of right lateral rib region; no deformity or discoloration appreciated   Lymph Nodes: No cervical or supraclavicular adenopathy.  Neurologic:  No focal numbness or weakness.   Psychiatric: Not depressed.      Laboratory  Lab Results   Component Value Date    WBC 8.91 11/13/2017    HGB 9.8 (L) 11/13/2017    HCT 32.2 (L) 11/13/2017     11/13/2017    CHOL 110 (L) 08/23/2017    TRIG 129 08/23/2017    HDL 23 (L) 08/23/2017    ALT 27 11/13/2017    AST 13 (L) 11/13/2017     11/13/2017    K 4.4 11/13/2017     11/13/2017    CREATININE 2.89 (H) 11/13/2017    BUN 67 (H) 11/13/2017    CO2 24 11/13/2017    TSH 0.886 08/23/2017    PSA 1.3 07/15/2015    INR 1.2 11/06/2017    HGBA1C 6.0 (H) 08/23/2017         Exam:  CT HEAD WITHOUT CONTRAST    Clinical History:  Altered mental status.     Technique: Axial CT imaging was performed through the head without intravenous contrast.     Comparison: November 7, 2017    Findings:     Age appropriate generalized cerebral and cerebellar atrophy. Moderate, symmetric, low density changes in the periventricular white matter consistent with chronic small vessel ischemic change.   No  intracranial hemorrhage is identified.   No hydrocephalus, midline shift or mass effect.  The calvarium is intact.   Visualized paranasal sinuses and mastoid air cells are clear.   Atheromatous calcifications involve the bilateral cavernous carotid arteries.   Impression            No CT evidence of acute intracranial abnormality.  Senescent changes.    All CT scans at this facility use dose modulation, iterative reconstruction and/or weight based dosing when appropriate to reduce radiation dose to as low as reasonably achievable.  II      Electronically signed by: KENA MOORE MD  Date: 11/10/17  Time: 19:34        Exam: XR CHEST 1 VIEW    Clinical History: Shortness of breath.     Findings:     The lungs are clear. Mild cardiomegaly.  Aortic atherosclerosis.   Impression      No acute cardiopulmonary disease.            Electronically signed by: KENA MOORE MD  Date: 11/10/17  Time: 19:56      2 D echo   Date of Procedure: 07/10/2017        TEST DESCRIPTION       General: The patient was in an irregularly irregular rhythm throughout the study.     Aorta: The aortic root is normal in size, measuring 2.2 cm at sinotubular junction and 3.6 cm at Sinuses of Valsalva. The proximal ascending aorta is normal in size, measuring 3.7 cm across.     Left Atrium: The left atrial volume index is severely enlarged, measuring 50.34 cc/m2.     Left Ventricle: The left ventricle is normal in size, with an end-diastolic diameter of 4.3 cm, and an end-systolic diameter of 2.8 cm. Septal wall thickness is upper limit of normal in size, with the septum measuring 1.1 cm and the posterior wall   measuring 1.0 cm across. Relative wall thickness was increased at 0.47, and the LV mass index was 77.7 g/m2 consistent with concentric remodeling. The following segments were hypokinetic: mid anteroseptum.  Left ventricular systolic function appears normal. Visually estimated ejection fraction is 60-65%. The LV Doppler derived stroke volume  equals 100.0 ccs.     Diastolic indices: No distinct A waves were identified on assessment of mitral inflow. E/e' ratio(avg) 13. Diastolic function is indeterminate.     Right Atrium: The right atrium is mildly enlarged, measuring 6.2 cm in length and 3.1 cm in width in the apical view.     Right Ventricle: The right ventricle is normal in size measuring 3.5 cm at the base in the apical right ventricle-focused view. Global right ventricular systolic function appears normal. There is abnormal septal motion consistent with RV pressure/volume   overload. Tricuspid annular plane systolic excursion (TAPSE) is 2.8 cm. The estimated PA systolic pressure is 39 mmHg.     Aortic Valve:  The aortic valve is moderately sclerotic with mildly restricted leaflet mobility. The peak velocity obtained across the aortic valve is 2.02 m/s, which translates to a peak gradient of 16 mmHg. The mean gradient is 9 mmHg. Using a left   ventricular outflow tract diameter of 2.2 cm, a left ventricular outflow tract velocity time integral of 27 cm, and a peak instantaneous transvalvular velocity time integral of 47 cm, the calculated aortic valve area is 2.12 cm2, consistent with trivial   aortic stenosis. Additionally, there is trivial aortic regurgitation.     Mitral Valve:  The mitral valve is mildly sclerotic. There is mild mitral regurgitation. There is mitral annular calcification.     Tricuspid Valve:  There is trivial to mild tricuspid regurgitation. Tricuspid valve is normal in structure with normal leaflet mobility.     Pulmonary Valve:  Pulmonary valve is normal in structure with normal leaflet mobility.     Pericardium: There is evidence of a small postero-lateral pericardial effusion.     IVC: IVC is enlarged but collapses > 50% with a sniff, suggesting intermediate right atrial pressure of 8 mmHg.     Intracavitary: There is no evidence of intracavity mass, thrombi, or vegetation.         CONCLUSIONS     1 - Normal left  "ventricular systolic function (EF 60-65%).     2 - Concentric remodeling.     3 - Wall motion abnormalities.     4 - Indeterminate LV diastolic function.     5 - Biatrial enlargement.     6 - Normal right ventricular systolic function .     7 - Trivial aortic stenosis, GERALDO = 2.12 cm2, peak velocity = 2.02 m/s, mean gradient = 9 mmHg.     8 - Trivial aortic regurgitation.     9 - Mild mitral regurgitation.     10 - Trivial to mild tricuspid regurgitation.     11 - Small pericardial effusion.     12 - Intermediate central venous pressure.     13 - The estimated PA systolic pressure is 39 mmHg.             This document has been electronically    SIGNED BY: Mahdu Santamaria MD On: 07/10/2017 09:31      Specimen Collected: 07/10/17 09:30 Last Resulted: 07/10/17 09:38            ASSESSMENT & PLAN:     HIGH RISK CONDITION(S):        Recent admission for Rhabdomyolysis 2/2 traumatic fall at home:   *CPK on admit: 797 / 675 at discharge ( 11/7)  - PT and OT per [facility]  Repeat CPK today       JOHN PAUL on CKD III:   (not resolved at discharge)  *Of note, was last seen by Nephro in 2015 (per Dr. MICHAEL Casas) with recs to RTC in 4 months, but patient fell through.  Baseline: 1.8-2.1  Admit: 1.8  Discharge: 2.8  - repeat BMP today *(concern with level of mentation as reported per the son, placing at increase risk for "falls" on AOC therapy, with unstable renal markers, in additon to being on insulin therapy)   *NO NSAIDS (d/w patient)   *Continue Lasix for now; but need to modify +/- hold the regimen (? Why taking other than for chronic LE edema)  *May need to undergo gentle diuresis (await the results)  - follow up with Nephro (12/13)        Acute Lower Rib Pain, post fall:   Noted the CXR results with no mention of the ribs  - check Rib Films   *Advised to take Tylenol PRN and avoid NSAIDS given renal issue.         Diabetes mellitus (II):   Most recent A1c: 6.0% (8/23), reflective of optimal control   Home Range: (fasting) 120s (reported; " did not bring machine or log)   Random BS in clinic: 102 mg/dl  - continue Januvia (may not continue to need in time)  - continue Lantus 25 daily (changed recently after visit to the ED per the ED Providers) agree with.  *Of note, took insulin this morning, but has not eaten.      Hypertension:   Per JNC 8, goal < 150/90  Today: 136/64 (normotensive)   - continue Coreg  - continue Cozaar       History of A Fib, chronic Anti Coagulation therapy:   *Apixaban (high risk being on this agent)  Rate today: 70  - continue Coreg      History of CARMELO:   CPAP (not consistent)   Settings: unclear       History of BPH:   (sxs controlled)  - continue Flomax therapy       Immunizations:   Flu Vaccine: 2017 (reports that rec'd at [employer])     Tdap: 3/2015    Instructions for the patient:  1) Will contact you with results of lab and xray done in clinic today.     2) Call with questions    [facility] Visit (Post Discharge Follow-up) Today:   - Our clinic physicians and nurses plan to follow the patient up for any medical issues in the [facility] for 30 days post discharge.    Future Appointments  Date Time Provider Department Center   11/20/2017 11:10 AM LAB, APPOINTMENT [facility]ED [facility]  [facility]   11/20/2017 11:30 AM [facility] XRIM1 485 LB LIMIT NOM XRAY IM [facility]   12/13/2017 8:15 AM LAB, [facility] K[facility] LAB [place]   12/13/2017 10:00 AM Norma Lockett MD [facility]RO [facility]   12/20/2017 8:00 AM Thompson Stiles MD [facility] MAGDALENO Ale[place]          Medication List          Accurate as of 11/20/17 10:57 AM. If you have any questions, ask your nurse or doctor.               START taking these medications    multivitamin per tablet  Commonly known as:  THERAGRAN  Take 1 tablet by mouth once daily.  Started by:  TALI Boone        CHANGE how you take these medications    blood sugar diagnostic Strp  Check glucose 2-3x/day before meals and/or bedtime  What changed:  additional instructions      insulin glargine 100 unit/mL injection  Commonly known as:  LANTUS  Inject 25 Units into the skin once daily. 50 units Subcutaneous Every morning.  Pt needs 90 days of lantus-takes 50 units QHS  What changed:  how much to take  Changed by:  TALI Boone     lancets Misc  Commonly known as:  ACCU-CHEK SOFTCLIX LANCETS  1 strip by Misc.(Non-Drug; Combo Route) route 2 (two) times daily.  What changed:  when to take this     lansoprazole 30 MG disintegrating tablet  Commonly known as:  PREVACID SOLUTAB  Take 1 tablet (30 mg total) by mouth once daily. 1 Tablet,Rapid Dissolve, DR Sublingual Every day.  for acid reflux  What changed:  · when to take this  · reasons to take this  · additional instructions     oxyCODONE 30 mg Tr12 12 hr tablet  Commonly known as:  OxyCONTIN  Take 1 tablet (30 mg total) by mouth every 12 (twelve) hours. 1 tab Q12 hours as needed for Lumbar/Back pain  What changed:  · when to take this  · reasons to take this  · additional instructions     tamsulosin 0.4 mg Cp24  Commonly known as:  FLOMAX  Take 1 capsule (0.4 mg total) by mouth once daily. 1 Capsule, Sust. Release 24 hr Oral Every day.  for prostate  What changed:  additional instructions        CONTINUE taking these medications    apixaban 2.5 mg Tab  Take 1 tablet (2.5 mg total) by mouth 2 (two) times daily.     COREG CR 20 mg 24 hr capsule  Generic drug:  CARVEDILOL PHOSPHATE 20 MG ORAL CM24     ferrous sulfate dried 160 mg Tbsr  Commonly known as:  SLOW FE  Take 1 tablet (160 mg total) by mouth once daily.     furosemide 40 MG tablet  Commonly known as:  LASIX  Take 1 tablet (40 mg total) by mouth once daily.     gabapentin 300 MG capsule  Commonly known as:  NEURONTIN  TAKE 1 CAPSULE BY MOUTH AT BEDTIME FOR DIABETIC NEUROPATHY     gentamicin 0.3 % ophthalmic solution  Commonly known as:  GARAMYCIN  Place 2 drops into both eyes 4 (four) times daily. For 7 days     losartan 100 MG tablet  Commonly known as:  COZAAR  Take  1 tablet (100 mg total) by mouth once daily.     SITagliptin 50 MG Tab  Commonly known as:  JANUVIA  Take 1 tablet (50 mg total) by mouth once daily. For Diabetes     timolol maleate 0.5% 0.5 % Drop  Commonly known as:  TIMOPTIC     TRAVATAN Z 0.004 % Drop  Generic drug:  travoprost           Where to Get Your Medications      You can get these medications from any pharmacy    You don't need a prescription for these medications  · multivitamin per tablet     Information about where to get these medications is not yet available    Ask your nurse or doctor about these medications  · insulin glargine 100 unit/mL injection         Signing Physician:  TALI Boone

## 2017-11-20 NOTE — TELEPHONE ENCOUNTER
BMP  Lab Results   Component Value Date     11/20/2017    K 4.8 11/20/2017     (H) 11/20/2017    CO2 28 11/20/2017    BUN 64 (H) 11/20/2017    CREATININE 2.1 (H) (<<< 2.89) 11/20/2017    CALCIUM 9.0 11/20/2017    ANIONGAP 6 (L) 11/20/2017    ESTGFRAFRICA 33 (A) 11/20/2017    EGFRNONAA 29 (A) 11/20/2017     CPK: 101 <<<675<<<797      Rib Film:   Views of both right and left ribs are available.  Patient has developed some pleural thickening on the right and I can see one or 2 undisplaced fractures of distal ribs on the right around the ninth or 10th rib.  No fractures are seen on the left.  Markings in the lungs have increased in the right and I believe there is an infiltrate developing.   Impression      Possible right rib fractures with early infiltrate on the right      Electronically signed by: Sergei Suero MD  Date: 11/20/17  Time: 11:33        A/P:   1) Acute, Traumatic Non-displaced 9th or 10th rib fracture:  - pain control (Tylenol 1 gm po every 8 hours PRN mild to moderate pain, and will add Tramadol PRN for severe pain, scaled  > 7 on pain scale.  - IS      2) Early onset, secondary Pneumonia, from acute non-displaced rib fracture:   - Avelox 400 mg daily x 5 days  - pulmonary toileting (IS and pain control)  - repeat CXR in 2 weeks       3) Rhabdomyolysis:   Resolved  CPK: wnl (101)      4) JOHN PAUL on CKD III:   (Improving)  Serum Creat: 2.1 today from 2.89 at discharge  (Baseline: 1.8-2.1)  *Establsihed and was being seen by Nephrology in the past (2015); fell through; scheduled to re-est with Nephro for 12/13/2017 (family in agreement)       RTC in 2 weeks.    KEON

## 2017-11-20 NOTE — PATIENT INSTRUCTIONS
1) Will contact you with results of lab and xray done in clinic today.     2) Call with questions      [facility] Visit (Post Discharge Follow-up) Today:   - Our clinic physicians and nurses plan to follow the patient up for any medical issues in the [facility] for 30 days post discharge.      Future Appointments  Date Time Provider Department Center   11/20/2017 11:10 AM LAB, APPOINTMENT [facility]TMED [facility]   11/20/2017 11:30 AM [facility] XRIM1 485 LB LIMIT NOM XRAY [facility]   12/13/2017 8:15 AM LAB, EDSON KENH LAB [place]   12/13/2017 10:00 AM Norma Lockett MD [facility] NEPHRO [facility]   12/20/2017 8:00 AM Thompson Stiles MD [facility] MAGDALENO [place]

## 2017-11-20 NOTE — TELEPHONE ENCOUNTER
Milvia, thanks for taking such good care of Mr Valerio. I have 1 question: Why 5 days of Avelox and not 7 for the pneumonitis.  He is a diabetic with CRI; it seems 7 days of anbx may be more appropriate.  Let me know your thoughts, Thompson Stiles

## 2017-11-20 NOTE — Clinical Note
Priority Clinic Visit (Post Discharge Follow-up) Today:  - Our clinic physicians and nurses plan to follow the patient up for any medical issues in the [facility] for 30 days post discharge.  Future Appointments: 11/20/2017 11:10 AM   LAB, APPOINTMENT [facility]* [facility]  11/20/2017 11:30 AM   [facility] XRIM1 485 LB LIMIT    [facility] XRAY [facility]  12/13/2017 8:15 AM    EDSON JOHNSON       [place] 12/13/2017 10:00 AM   Norma Lockett MD           [facility] NEPHRO    [facility] 12/20/2017 8:00 AM    Thompson Stiles MD       [facility] MAGDALENO Alejandro

## 2017-11-20 NOTE — TELEPHONE ENCOUNTER
Spoke with Jr. Joseph (voiced POA in the presence of the patient).   He will ask his wife, Hailee to  IS, follow up apts and new med list on tomorrow (11/21). (Items left, in a bag, at the check out desk).   Has been informed of the findings and the plan. Voiced understanding. Has our direct clinic number in the event of questions or concerns.    KEON

## 2017-11-21 RX ORDER — MOXIFLOXACIN HYDROCHLORIDE 400 MG/1
400 TABLET ORAL DAILY
Qty: 5 TABLET | Refills: 0 | Status: SHIPPED | OUTPATIENT
Start: 2017-11-21 | End: 2017-12-26

## 2017-11-28 ENCOUNTER — TELEPHONE (OUTPATIENT)
Dept: INTERNAL MEDICINE | Facility: CLINIC | Age: 82
End: 2017-11-28

## 2017-11-28 NOTE — TELEPHONE ENCOUNTER
----- Message from Mateo Holm sent at 11/28/2017  8:26 AM CST -----  Contact: [facility]   Type: Home Health (orders, updates, clarifications, etc.)    Home Health Agency/Nurse: Mona     Phone number: [phone]    Reason for call: patient had a fall last night in home with no injury .     Comments:  Please advise, Thanks

## 2017-11-28 NOTE — TELEPHONE ENCOUNTER
Spoke with Mona the Home Health Nurse, patient has no injuries from fall, he was reaching for something in kitchen cabinet and loss his balance, no bruises, no head injuries, no open wounds, no pain.

## 2017-12-13 ENCOUNTER — LAB VISIT (OUTPATIENT)
Dept: LAB | Facility: HOSPITAL | Age: 82
End: 2017-12-13
Attending: INTERNAL MEDICINE
Payer: MEDICARE

## 2017-12-13 DIAGNOSIS — N18.4 CONTROLLED TYPE 2 DIABETES MELLITUS WITH STAGE 4 CHRONIC KIDNEY DISEASE, WITH LONG-TERM CURRENT USE OF INSULIN: ICD-10-CM

## 2017-12-13 DIAGNOSIS — E11.22 CONTROLLED TYPE 2 DIABETES MELLITUS WITH STAGE 4 CHRONIC KIDNEY DISEASE, WITH LONG-TERM CURRENT USE OF INSULIN: ICD-10-CM

## 2017-12-13 DIAGNOSIS — Z79.4 CONTROLLED TYPE 2 DIABETES MELLITUS WITH STAGE 4 CHRONIC KIDNEY DISEASE, WITH LONG-TERM CURRENT USE OF INSULIN: ICD-10-CM

## 2017-12-13 LAB
ALBUMIN SERPL BCP-MCNC: 2.6 G/DL
ALP SERPL-CCNC: 83 U/L
ALT SERPL W/O P-5'-P-CCNC: 27 U/L
ANION GAP SERPL CALC-SCNC: 6 MMOL/L
AST SERPL-CCNC: 21 U/L
BASOPHILS # BLD AUTO: 0.02 K/UL
BASOPHILS NFR BLD: 0.2 %
BILIRUB SERPL-MCNC: 1.4 MG/DL
BUN SERPL-MCNC: 44 MG/DL
CALCIUM SERPL-MCNC: 8.5 MG/DL
CHLORIDE SERPL-SCNC: 112 MMOL/L
CHOLEST SERPL-MCNC: 102 MG/DL
CHOLEST/HDLC SERPL: 4.1 {RATIO}
CO2 SERPL-SCNC: 25 MMOL/L
CREAT SERPL-MCNC: 2.3 MG/DL
DIFFERENTIAL METHOD: ABNORMAL
EOSINOPHIL # BLD AUTO: 0.1 K/UL
EOSINOPHIL NFR BLD: 0.8 %
ERYTHROCYTE [DISTWIDTH] IN BLOOD BY AUTOMATED COUNT: 13.4 %
EST. GFR  (AFRICAN AMERICAN): 29.7 ML/MIN/1.73 M^2
EST. GFR  (NON AFRICAN AMERICAN): 25.7 ML/MIN/1.73 M^2
ESTIMATED AVG GLUCOSE: 114 MG/DL
GLUCOSE SERPL-MCNC: 189 MG/DL
HBA1C MFR BLD HPLC: 5.6 %
HCT VFR BLD AUTO: 30.2 %
HDLC SERPL-MCNC: 25 MG/DL
HDLC SERPL: 24.5 %
HGB BLD-MCNC: 9 G/DL
IMM GRANULOCYTES # BLD AUTO: 0.04 K/UL
IMM GRANULOCYTES NFR BLD AUTO: 0.5 %
LDLC SERPL CALC-MCNC: 60 MG/DL
LYMPHOCYTES # BLD AUTO: 1.3 K/UL
LYMPHOCYTES NFR BLD: 14.9 %
MCH RBC QN AUTO: 27.7 PG
MCHC RBC AUTO-ENTMCNC: 29.8 G/DL
MCV RBC AUTO: 93 FL
MONOCYTES # BLD AUTO: 0.7 K/UL
MONOCYTES NFR BLD: 7.6 %
NEUTROPHILS # BLD AUTO: 6.5 K/UL
NEUTROPHILS NFR BLD: 76 %
NONHDLC SERPL-MCNC: 77 MG/DL
NRBC BLD-RTO: 0 /100 WBC
PLATELET # BLD AUTO: 173 K/UL
PMV BLD AUTO: 12.4 FL
POTASSIUM SERPL-SCNC: 4.8 MMOL/L
PROT SERPL-MCNC: 6 G/DL
RBC # BLD AUTO: 3.25 M/UL
SODIUM SERPL-SCNC: 143 MMOL/L
TRIGL SERPL-MCNC: 85 MG/DL
WBC # BLD AUTO: 8.52 K/UL

## 2017-12-13 PROCEDURE — 83036 HEMOGLOBIN GLYCOSYLATED A1C: CPT

## 2017-12-13 PROCEDURE — 80061 LIPID PANEL: CPT

## 2017-12-13 PROCEDURE — 85025 COMPLETE CBC W/AUTO DIFF WBC: CPT

## 2017-12-13 PROCEDURE — 80053 COMPREHEN METABOLIC PANEL: CPT

## 2017-12-13 PROCEDURE — 36415 COLL VENOUS BLD VENIPUNCTURE: CPT | Mod: PO

## 2017-12-20 ENCOUNTER — OFFICE VISIT (OUTPATIENT)
Dept: INTERNAL MEDICINE | Facility: CLINIC | Age: 82
End: 2017-12-20
Payer: MEDICARE

## 2017-12-20 ENCOUNTER — HOSPITAL ENCOUNTER (OUTPATIENT)
Dept: RADIOLOGY | Facility: HOSPITAL | Age: 82
Discharge: HOME | End: 2017-12-20
Attending: INTERNAL MEDICINE
Payer: MEDICARE

## 2017-12-20 VITALS
WEIGHT: 221 LBS | OXYGEN SATURATION: 97 % | SYSTOLIC BLOOD PRESSURE: 136 MMHG | HEART RATE: 58 BPM | HEIGHT: 71 IN | BODY MASS INDEX: 30.94 KG/M2 | DIASTOLIC BLOOD PRESSURE: 88 MMHG

## 2017-12-20 DIAGNOSIS — M89.9 DISORDER OF BONE: ICD-10-CM

## 2017-12-20 DIAGNOSIS — E11.22 CONTROLLED TYPE 2 DIABETES MELLITUS WITH STAGE 4 CHRONIC KIDNEY DISEASE, WITH LONG-TERM CURRENT USE OF INSULIN: ICD-10-CM

## 2017-12-20 DIAGNOSIS — N18.4 CHRONIC RENAL IMPAIRMENT, STAGE 4 (SEVERE): Primary | ICD-10-CM

## 2017-12-20 DIAGNOSIS — Z79.4 CONTROLLED TYPE 2 DIABETES MELLITUS WITH STAGE 4 CHRONIC KIDNEY DISEASE, WITH LONG-TERM CURRENT USE OF INSULIN: ICD-10-CM

## 2017-12-20 DIAGNOSIS — D64.9 ANEMIA, UNSPECIFIED TYPE: ICD-10-CM

## 2017-12-20 DIAGNOSIS — R91.8 PULMONARY NODULES: ICD-10-CM

## 2017-12-20 DIAGNOSIS — J18.9 PNEUMONIA OF RIGHT LOWER LOBE DUE TO INFECTIOUS ORGANISM: ICD-10-CM

## 2017-12-20 DIAGNOSIS — E46 PROTEIN-CALORIE MALNUTRITION, UNSPECIFIED SEVERITY: ICD-10-CM

## 2017-12-20 DIAGNOSIS — N18.4 CONTROLLED TYPE 2 DIABETES MELLITUS WITH STAGE 4 CHRONIC KIDNEY DISEASE, WITH LONG-TERM CURRENT USE OF INSULIN: ICD-10-CM

## 2017-12-20 DIAGNOSIS — M51.37 DEGENERATION OF LUMBAR OR LUMBOSACRAL INTERVERTEBRAL DISC: ICD-10-CM

## 2017-12-20 DIAGNOSIS — R19.7 DIARRHEA, UNSPECIFIED TYPE: ICD-10-CM

## 2017-12-20 PROCEDURE — 94640 AIRWAY INHALATION TREATMENT: CPT | Mod: PBBFAC,PO

## 2017-12-20 PROCEDURE — 99215 OFFICE O/P EST HI 40 MIN: CPT | Mod: S$PBB,,, | Performed by: INTERNAL MEDICINE

## 2017-12-20 PROCEDURE — 99215 OFFICE O/P EST HI 40 MIN: CPT | Mod: PBBFAC,25,PO | Performed by: INTERNAL MEDICINE

## 2017-12-20 PROCEDURE — 71250 CT THORAX DX C-: CPT | Mod: TC

## 2017-12-20 PROCEDURE — 99999 PR PBB SHADOW E&M-EST. PATIENT-LVL V: CPT | Mod: PBBFAC,,, | Performed by: INTERNAL MEDICINE

## 2017-12-20 PROCEDURE — 71250 CT THORAX DX C-: CPT | Mod: 26,,, | Performed by: RADIOLOGY

## 2017-12-20 RX ORDER — OXYCODONE HYDROCHLORIDE 30 MG/1
30 TABLET, FILM COATED, EXTENDED RELEASE ORAL EVERY 12 HOURS
Qty: 90 TABLET | Refills: 0 | Status: ON HOLD | OUTPATIENT
Start: 2017-12-20 | End: 2018-01-10 | Stop reason: HOSPADM

## 2017-12-20 RX ORDER — MULTIVITAMIN
TABLET ORAL
Qty: 30 TABLET | Status: SHIPPED | OUTPATIENT
Start: 2017-12-20

## 2017-12-20 RX ORDER — ALBUTEROL SULFATE 0.83 MG/ML
2.5 SOLUTION RESPIRATORY (INHALATION)
Status: COMPLETED | OUTPATIENT
Start: 2017-12-20 | End: 2017-12-20

## 2017-12-20 RX ORDER — INSULIN GLARGINE 100 [IU]/ML
INJECTION, SOLUTION SUBCUTANEOUS
Qty: 15 ML | Refills: 3 | Status: SHIPPED | OUTPATIENT
Start: 2017-12-20 | End: 2017-12-21 | Stop reason: SDUPTHER

## 2017-12-20 RX ADMIN — ALBUTEROL SULFATE 2.5 MG: 2.5 SOLUTION RESPIRATORY (INHALATION) at 09:12

## 2017-12-20 NOTE — PROGRESS NOTES
Verified patients name and date of birth.  Patient receiving 1 unit dose of Albuterol 2.5 mg/3 ml neb treatment.

## 2017-12-21 ENCOUNTER — TELEPHONE (OUTPATIENT)
Dept: INTERNAL MEDICINE | Facility: CLINIC | Age: 82
End: 2017-12-21

## 2017-12-21 RX ORDER — INSULIN GLARGINE 100 [IU]/ML
INJECTION, SOLUTION SUBCUTANEOUS
Qty: 15 ML | Refills: 0 | Status: ON HOLD
Start: 2017-12-21 | End: 2018-01-10

## 2017-12-21 NOTE — PROGRESS NOTES
Mr. Valerio is an 82-year-old gentleman, known to myself, who presented to   clinic yesterday, December 20th, at which time clinic billing was performed.    His note is being dictated today, December 21st.    CHIEF COMPLAINT:  Followup hospitalization and pneumonia/rib fracture as well as   chronic renal insufficiency and diabetes.    HISTORY OF PRESENT ILLNESS:  Mr. Valerio presented to clinic with his son,   ____ Abrahan for followup of the above.  Mr. Valerio is status post   hospitalization November 6th, status post a fall resulting him lying on the   ground at home for a day and a half.  He was admitted with rhabdomyolysis and   worsening renal insufficiency.  He was given fluids and improved.  He notes that   he has had worsening weakness and numbness in his legs, seems to be worse in   the left leg.  He believes this is what contributed to his fall.  He notes that   his phone was out of battery, so he could not make a call.  His son notes that   he does have a [employer] alert button at home, but does not wear it or keep it by   him, he is frustrated with this.  His son notes that he was having some   transient memory loss and confusion after the fall and hospitalization, but this   has actually resolved.  He is worried that he may need further assistance at   home or down the road may need to be transferred to an assisted living facility   with closer care.  He notes that he and his wife works full-time, which makes it   difficult for him to be there all the time.  He notes that he has had diarrhea   daily, notes that it started about a month and a half ago when all of his issues   began with a fall and he notes that he was actually seen by Dr. Milvia Hartman   in priority clinic for followup.  He was actually given a course of antibiotics   for what appeared to be a right lower lobe pneumonia, which he completed.  At   that time he was also diagnosed with right rib fractures.  He notes that the   pain  from this has improved.  He would like a refill on his oxycodone, which he   uses for pain from his back.  He is not interested in having an MRI of his   lumbar spine or being further evaluated in Neurosurgery at this point.  He was   recommended to be seen in Nephrology, but t has not scheduled this appointment   yet.  He was also suppose to have a CT of his chest for followup as was ordered   by his pulmonologist, Dr. Ho, but he did not have this done either.    PAST MEDICAL, SURGICAL, SOCIAL HISTORY:  Please see as thoroughly stated in EPIC   chart, which has been reviewed.    PHYSICAL EXAMINATION:  VITAL SIGNS:  Weight 221 pounds, blood pressure 136/81, pulse 58.  GENERAL:  The patient appears weak, but is able to ambulate to the exam table   with assistance.  HEENT:  Grossly unremarkable.  NECK:  Supple.  Negative for masses, thyromegaly, lymphadenopathy.  LUNGS:  Show diffuse coarse breath sounds with rhonchi throughout.  No wheezing.  HEART:  Regular rate and rhythm without arrhythmias.  ABDOMEN:  Soft, nontender, no masses or organomegaly.  EXTREMITIES:  Show trace pretibial edema with chronic venous stasis, skin color   changes; actually improved from past.    WORKUP:  Lab work done 12/13/2017, showing hemoglobin A1c controlled at 5.6.    Comprehensive chemistry is with BUN and creatinine at 44/2.3, worsened over the   course of last year with GFR 25 with some worsening as well over the last few   year.  CBC is with H and H at 9 and 30%.  Cholesterol 102, LDL 60.    ASSESSMENT AND PLAN:  1.  Right rib fracture, healing well.  A.  If any recurrent pain, patient will let us know.  2.  History of abnormal chest CT and recent right lower lobe pneumonia with   abnormal pulmonary exam.  A.  CT chest without contrast.  B.  Refer to Pulmonary Dr. Ho for followup ASAP.  3.  Chronic renal insufficiency, stage IV, with prior Nephrology evaluations,   but last greater than one year ago and patient have  not scheduled to followup   since then.  A.  We will schedule Nephrology appointment for evaluation and recommendations   in close following.  4.  Lumbar osteoarthritis with degenerative disk disease and secondary   weakness/numbness in the legs.  A.  I have given a refill on oxycodone 30 mg 1 tablet as needed to be taken   conservatively 90 tabs, no refills.  B.  I have given prescription for rolling walker.  5.  Diarrhea with recent antibiotic intake over the last month or two, rule out   C. difficile, rule out other.  A.  We will order stool studies for blood culture, WBCs, C. Difficile, OPCs with   phone review.  6.  Hypocalcemia.  A.  I have recommended calcium with vitamin D 600 mg/400 units one a day.  7.  Diabetes mellitus type 2, insulin-dependent with chronic renal   insufficiency, controlled on present therapy.  A.  Continue with the Lantus 25 units in the a.m. and Januvia 50 mg daily.  B.  Reevaluated with return to clinic by 3 to 4 months.  8.  Health maintenance.  9.  Screening:  10.  Last colonoscopy in April 2015 showing one adenomatous polyp.  11.  Podiatry appointment pending.  12.  Ophthalmology appointment up-to-date with Dr. Pierson.  A.  Phone review CT chest.  B.  Return to clinic in three months with fasting, one week prior lab work, go   from there.  Plan to phone review other studies including CT and stool studies   prior to then.  C.  Health maintenance.  I have discussed with patient at length the importance   of community resources.  I have given a senior resource guide and I have   encouraged the patient to contact the  who he is due to call back   as phone number can also order home health as needed down the road.    We will bill this appointment based on time spent greater than one hour.      FMS/IN  dd: 12/21/2017 08:59:30 (CST)  td: 12/22/2017 01:47:32 (CST)  Doc ID   #[id number]  Job ID #[id number]    CC:     This office note has been dictated.

## 2017-12-21 NOTE — TELEPHONE ENCOUNTER
Dr Ho,  I saw pt yesterday for a hospital follow up and f/u right sided pneumonia after right rib fracture. I ordered a f/u CT which you requested but pt hadn't done. It showed conyinued scattered opacities with ?able loculated effusion in right lower lobe.  He was afebrile and had normal O2 sats yesterday in clinic but lung sounds were coarse and +rhonchi diffusely. I was hoping you could get him back in to assist with treatment recommendations.  Thanks, Thompson Phillips

## 2017-12-21 NOTE — TELEPHONE ENCOUNTER
Phone calls to pt and sonJoseph with message left to return call ie CT Chest. CT showed continued opacities with ? RLL loculated effusion. Pt needs RTC Appt with Pulmonary/Dr Ho.  Kristel, please call pt's son to schedule Pulmonary Appt/Dr Ho and other appts he should have made upon check out yesterday.  Thanks so much!

## 2017-12-22 ENCOUNTER — TELEPHONE (OUTPATIENT)
Dept: INTERNAL MEDICINE | Facility: CLINIC | Age: 82
End: 2017-12-22

## 2017-12-22 NOTE — TELEPHONE ENCOUNTER
----- Message from Fanny Vallejo sent at 12/21/2017  3:49 PM CST -----  Contact: [facility]/[phone]  Home health called in regards to getting the pt most recent visit notes. Fax [phone]        Please advise

## 2017-12-22 NOTE — TELEPHONE ENCOUNTER
Spoke with Stefania with [facility] regarding below message, is it ok to send latest progress note?

## 2017-12-26 ENCOUNTER — HOSPITAL ENCOUNTER (INPATIENT)
Facility: HOSPITAL | Age: 82
LOS: 3 days | Discharge: NURSING, REHAB OR LONG-TERM CARE | DRG: 193 | End: 2017-12-29
Attending: EMERGENCY MEDICINE | Admitting: HOSPITALIST
Payer: MEDICARE

## 2017-12-26 DIAGNOSIS — N18.30 TYPE 2 DIABETES MELLITUS WITH STAGE 3 CHRONIC KIDNEY DISEASE, WITH LONG-TERM CURRENT USE OF INSULIN: Chronic | ICD-10-CM

## 2017-12-26 DIAGNOSIS — R53.81 DEBILITY: ICD-10-CM

## 2017-12-26 DIAGNOSIS — J96.01 ACUTE RESPIRATORY FAILURE WITH HYPOXIA: ICD-10-CM

## 2017-12-26 DIAGNOSIS — M51.9 LUMBAR DISC DISEASE: ICD-10-CM

## 2017-12-26 DIAGNOSIS — Z79.01 CHRONIC ANTICOAGULATION: Primary | Chronic | ICD-10-CM

## 2017-12-26 DIAGNOSIS — I35.9 NONRHEUMATIC AORTIC VALVE DISORDER: ICD-10-CM

## 2017-12-26 DIAGNOSIS — E11.22 TYPE 2 DIABETES MELLITUS WITH STAGE 3 CHRONIC KIDNEY DISEASE, WITH LONG-TERM CURRENT USE OF INSULIN: Chronic | ICD-10-CM

## 2017-12-26 DIAGNOSIS — R79.89 ELEVATED TROPONIN: ICD-10-CM

## 2017-12-26 DIAGNOSIS — I48.19 PERSISTENT ATRIAL FIBRILLATION: Chronic | ICD-10-CM

## 2017-12-26 DIAGNOSIS — R06.02 SHORTNESS OF BREATH: ICD-10-CM

## 2017-12-26 DIAGNOSIS — Z79.4 TYPE 2 DIABETES MELLITUS WITH STAGE 3 CHRONIC KIDNEY DISEASE, WITH LONG-TERM CURRENT USE OF INSULIN: Chronic | ICD-10-CM

## 2017-12-26 PROBLEM — I51.7 LEFT ATRIAL ENLARGEMENT: Chronic | Status: ACTIVE | Noted: 2017-12-26

## 2017-12-26 PROBLEM — I27.20 PULMONARY HYPERTENSION: Status: ACTIVE | Noted: 2017-12-26

## 2017-12-26 PROBLEM — R53.1 WEAKNESS: Status: RESOLVED | Noted: 2017-11-07 | Resolved: 2017-12-26

## 2017-12-26 PROBLEM — T79.6XXA TRAUMATIC RHABDOMYOLYSIS: Status: RESOLVED | Noted: 2017-11-07 | Resolved: 2017-12-26

## 2017-12-26 PROBLEM — J18.9 PNEUMONIA OF RIGHT LOWER LOBE DUE TO INFECTIOUS ORGANISM: Status: ACTIVE | Noted: 2017-12-26

## 2017-12-26 LAB
ALBUMIN SERPL BCP-MCNC: 2.8 G/DL
ALLENS TEST: ABNORMAL
ALP SERPL-CCNC: 90 U/L
ALT SERPL W/O P-5'-P-CCNC: 29 U/L
AMMONIA PLAS-SCNC: 36 UMOL/L
AMPHET+METHAMPHET UR QL: NEGATIVE
ANION GAP SERPL CALC-SCNC: 10 MMOL/L
AST SERPL-CCNC: 34 U/L
BARBITURATES UR QL SCN>200 NG/ML: NEGATIVE
BASOPHILS # BLD AUTO: 0.02 K/UL
BASOPHILS NFR BLD: 0.1 %
BENZODIAZ UR QL SCN>200 NG/ML: NEGATIVE
BILIRUB SERPL-MCNC: 0.8 MG/DL
BILIRUB UR QL STRIP: NEGATIVE
BNP SERPL-MCNC: 365 PG/ML
BUN SERPL-MCNC: 38 MG/DL
BZE UR QL SCN: NEGATIVE
CALCIUM SERPL-MCNC: 8 MG/DL
CANNABINOIDS UR QL SCN: NEGATIVE
CHLORIDE SERPL-SCNC: 110 MMOL/L
CLARITY UR: CLEAR
CO2 SERPL-SCNC: 23 MMOL/L
COLOR UR: YELLOW
CREAT SERPL-MCNC: 3 MG/DL
CREAT UR-MCNC: 185 MG/DL
DELSYS: ABNORMAL
DIFFERENTIAL METHOD: ABNORMAL
EOSINOPHIL # BLD AUTO: 0.1 K/UL
EOSINOPHIL NFR BLD: 0.3 %
EP: 7
ERYTHROCYTE [DISTWIDTH] IN BLOOD BY AUTOMATED COUNT: 13.7 %
EST. GFR  (AFRICAN AMERICAN): 22 ML/MIN/1.73 M^2
EST. GFR  (NON AFRICAN AMERICAN): 19 ML/MIN/1.73 M^2
ETHANOL SERPL-MCNC: <10 MG/DL
FIO2: 100
FLUAV AG SPEC QL IA: NEGATIVE
FLUBV AG SPEC QL IA: NEGATIVE
GLUCOSE SERPL-MCNC: 169 MG/DL
GLUCOSE UR QL STRIP: NEGATIVE
HCO3 UR-SCNC: 28.3 MMOL/L (ref 24–28)
HCT VFR BLD AUTO: 33.6 %
HGB BLD-MCNC: 10 G/DL
HGB UR QL STRIP: NEGATIVE
INR PPP: 1.1
IP: 14
KETONES UR QL STRIP: NEGATIVE
LACTATE SERPL-SCNC: 1.4 MMOL/L
LEUKOCYTE ESTERASE UR QL STRIP: NEGATIVE
LYMPHOCYTES # BLD AUTO: 0.9 K/UL
LYMPHOCYTES NFR BLD: 6.4 %
MCH RBC QN AUTO: 27.5 PG
MCHC RBC AUTO-ENTMCNC: 29.8 G/DL
MCV RBC AUTO: 93 FL
METHADONE UR QL SCN>300 NG/ML: NEGATIVE
MODE: ABNORMAL
MONOCYTES # BLD AUTO: 1.1 K/UL
MONOCYTES NFR BLD: 7.5 %
NEUTROPHILS # BLD AUTO: 12.5 K/UL
NEUTROPHILS NFR BLD: 85.5 %
NITRITE UR QL STRIP: NEGATIVE
OPIATES UR QL SCN: NORMAL
PCO2 BLDA: 66.8 MMHG (ref 35–45)
PCP UR QL SCN>25 NG/ML: NEGATIVE
PH SMN: 7.24 [PH] (ref 7.35–7.45)
PH UR STRIP: 5 [PH] (ref 5–8)
PLATELET # BLD AUTO: 268 K/UL
PMV BLD AUTO: 10.9 FL
PO2 BLDA: 23 MMHG (ref 40–60)
POC BE: 1 MMOL/L
POC SATURATED O2: 29 % (ref 95–100)
POC TCO2: 30 MMOL/L (ref 24–29)
POCT GLUCOSE: 182 MG/DL (ref 70–110)
POCT GLUCOSE: 197 MG/DL (ref 70–110)
POTASSIUM SERPL-SCNC: 4.1 MMOL/L
PROT SERPL-MCNC: 6.3 G/DL
PROT UR QL STRIP: ABNORMAL
PROTHROMBIN TIME: 11.4 SEC
RBC # BLD AUTO: 3.63 M/UL
SAMPLE: ABNORMAL
SITE: ABNORMAL
SODIUM SERPL-SCNC: 143 MMOL/L
SP GR UR STRIP: 1.02 (ref 1–1.03)
SP02: 100
SPECIMEN SOURCE: NORMAL
TOXICOLOGY INFORMATION: NORMAL
TROPONIN I SERPL DL<=0.01 NG/ML-MCNC: 0.08 NG/ML
TROPONIN I SERPL DL<=0.01 NG/ML-MCNC: 1.31 NG/ML
URN SPEC COLLECT METH UR: ABNORMAL
UROBILINOGEN UR STRIP-ACNC: NEGATIVE EU/DL
WBC # BLD AUTO: 14.59 K/UL

## 2017-12-26 PROCEDURE — 99285 EMERGENCY DEPT VISIT HI MDM: CPT | Mod: 25

## 2017-12-26 PROCEDURE — 85610 PROTHROMBIN TIME: CPT

## 2017-12-26 PROCEDURE — 25000242 PHARM REV CODE 250 ALT 637 W/ HCPCS: Performed by: HOSPITALIST

## 2017-12-26 PROCEDURE — 11000001 HC ACUTE MED/SURG PRIVATE ROOM

## 2017-12-26 PROCEDURE — 87400 INFLUENZA A/B EACH AG IA: CPT

## 2017-12-26 PROCEDURE — 82803 BLOOD GASES ANY COMBINATION: CPT

## 2017-12-26 PROCEDURE — 36600 WITHDRAWAL OF ARTERIAL BLOOD: CPT

## 2017-12-26 PROCEDURE — 80307 DRUG TEST PRSMV CHEM ANLYZR: CPT

## 2017-12-26 PROCEDURE — 80320 DRUG SCREEN QUANTALCOHOLS: CPT

## 2017-12-26 PROCEDURE — 51702 INSERT TEMP BLADDER CATH: CPT

## 2017-12-26 PROCEDURE — 84484 ASSAY OF TROPONIN QUANT: CPT | Mod: 91

## 2017-12-26 PROCEDURE — 85025 COMPLETE CBC W/AUTO DIFF WBC: CPT

## 2017-12-26 PROCEDURE — 96375 TX/PRO/DX INJ NEW DRUG ADDON: CPT

## 2017-12-26 PROCEDURE — 63600175 PHARM REV CODE 636 W HCPCS: Performed by: EMERGENCY MEDICINE

## 2017-12-26 PROCEDURE — 94660 CPAP INITIATION&MGMT: CPT

## 2017-12-26 PROCEDURE — 83880 ASSAY OF NATRIURETIC PEPTIDE: CPT

## 2017-12-26 PROCEDURE — 27000190 HC CPAP FULL FACE MASK W/VALVE

## 2017-12-26 PROCEDURE — 80053 COMPREHEN METABOLIC PANEL: CPT

## 2017-12-26 PROCEDURE — 94640 AIRWAY INHALATION TREATMENT: CPT

## 2017-12-26 PROCEDURE — 93010 ELECTROCARDIOGRAM REPORT: CPT | Mod: ,,, | Performed by: INTERNAL MEDICINE

## 2017-12-26 PROCEDURE — 81003 URINALYSIS AUTO W/O SCOPE: CPT

## 2017-12-26 PROCEDURE — 83605 ASSAY OF LACTIC ACID: CPT

## 2017-12-26 PROCEDURE — 93005 ELECTROCARDIOGRAM TRACING: CPT

## 2017-12-26 PROCEDURE — 82962 GLUCOSE BLOOD TEST: CPT

## 2017-12-26 PROCEDURE — 82140 ASSAY OF AMMONIA: CPT

## 2017-12-26 PROCEDURE — 87040 BLOOD CULTURE FOR BACTERIA: CPT | Mod: 59

## 2017-12-26 PROCEDURE — 25000003 PHARM REV CODE 250: Performed by: PHYSICIAN ASSISTANT

## 2017-12-26 PROCEDURE — 96365 THER/PROPH/DIAG IV INF INIT: CPT

## 2017-12-26 PROCEDURE — 63600175 PHARM REV CODE 636 W HCPCS: Performed by: PHYSICIAN ASSISTANT

## 2017-12-26 PROCEDURE — 99900035 HC TECH TIME PER 15 MIN (STAT)

## 2017-12-26 PROCEDURE — 84484 ASSAY OF TROPONIN QUANT: CPT

## 2017-12-26 PROCEDURE — 87086 URINE CULTURE/COLONY COUNT: CPT

## 2017-12-26 PROCEDURE — 25000003 PHARM REV CODE 250: Performed by: EMERGENCY MEDICINE

## 2017-12-26 PROCEDURE — 96367 TX/PROPH/DG ADDL SEQ IV INF: CPT

## 2017-12-26 RX ORDER — CEFEPIME HYDROCHLORIDE 2 G/1
2 INJECTION, POWDER, FOR SOLUTION INTRAVENOUS
Status: COMPLETED | OUTPATIENT
Start: 2017-12-26 | End: 2017-12-26

## 2017-12-26 RX ORDER — RAMELTEON 8 MG/1
8 TABLET ORAL NIGHTLY PRN
Status: DISCONTINUED | OUTPATIENT
Start: 2017-12-26 | End: 2017-12-29 | Stop reason: HOSPADM

## 2017-12-26 RX ORDER — SODIUM CHLORIDE 0.9 % (FLUSH) 0.9 %
5 SYRINGE (ML) INJECTION
Status: DISCONTINUED | OUTPATIENT
Start: 2017-12-26 | End: 2017-12-29 | Stop reason: HOSPADM

## 2017-12-26 RX ORDER — ONDANSETRON 8 MG/1
8 TABLET, ORALLY DISINTEGRATING ORAL EVERY 6 HOURS PRN
Status: DISCONTINUED | OUTPATIENT
Start: 2017-12-26 | End: 2017-12-29 | Stop reason: HOSPADM

## 2017-12-26 RX ORDER — IPRATROPIUM BROMIDE AND ALBUTEROL SULFATE 2.5; .5 MG/3ML; MG/3ML
3 SOLUTION RESPIRATORY (INHALATION)
Status: DISCONTINUED | OUTPATIENT
Start: 2017-12-26 | End: 2017-12-29 | Stop reason: HOSPADM

## 2017-12-26 RX ORDER — ASPIRIN 325 MG
325 TABLET ORAL ONCE
Status: COMPLETED | OUTPATIENT
Start: 2017-12-26 | End: 2017-12-26

## 2017-12-26 RX ORDER — GABAPENTIN 300 MG/1
300 CAPSULE ORAL NIGHTLY
Status: DISCONTINUED | OUTPATIENT
Start: 2017-12-26 | End: 2017-12-29 | Stop reason: HOSPADM

## 2017-12-26 RX ORDER — ONDANSETRON 2 MG/ML
4 INJECTION INTRAMUSCULAR; INTRAVENOUS EVERY 6 HOURS PRN
Status: DISCONTINUED | OUTPATIENT
Start: 2017-12-26 | End: 2017-12-29 | Stop reason: HOSPADM

## 2017-12-26 RX ORDER — TAMSULOSIN HYDROCHLORIDE 0.4 MG/1
0.4 CAPSULE ORAL DAILY
Status: DISCONTINUED | OUTPATIENT
Start: 2017-12-27 | End: 2017-12-29 | Stop reason: HOSPADM

## 2017-12-26 RX ORDER — ACETAMINOPHEN 325 MG/1
650 TABLET ORAL EVERY 4 HOURS PRN
Status: DISCONTINUED | OUTPATIENT
Start: 2017-12-26 | End: 2017-12-29 | Stop reason: HOSPADM

## 2017-12-26 RX ORDER — ACETAMINOPHEN 10 MG/ML
1000 INJECTION, SOLUTION INTRAVENOUS
Status: COMPLETED | OUTPATIENT
Start: 2017-12-26 | End: 2017-12-26

## 2017-12-26 RX ORDER — OXYCODONE HYDROCHLORIDE 5 MG/1
10 TABLET ORAL EVERY 6 HOURS PRN
Status: DISCONTINUED | OUTPATIENT
Start: 2017-12-26 | End: 2017-12-29 | Stop reason: HOSPADM

## 2017-12-26 RX ORDER — CEFEPIME HYDROCHLORIDE 1 G/50ML
1 INJECTION, SOLUTION INTRAVENOUS
Status: DISCONTINUED | OUTPATIENT
Start: 2017-12-26 | End: 2017-12-27

## 2017-12-26 RX ORDER — HYDROCODONE BITARTRATE AND ACETAMINOPHEN 5; 325 MG/1; MG/1
1 TABLET ORAL EVERY 6 HOURS PRN
Status: DISCONTINUED | OUTPATIENT
Start: 2017-12-26 | End: 2017-12-29 | Stop reason: HOSPADM

## 2017-12-26 RX ADMIN — VANCOMYCIN HYDROCHLORIDE 1000 MG: 1 INJECTION, POWDER, LYOPHILIZED, FOR SOLUTION INTRAVENOUS at 08:12

## 2017-12-26 RX ADMIN — CEFEPIME HYDROCHLORIDE 2 G: 2 INJECTION, POWDER, FOR SOLUTION INTRAVENOUS at 06:12

## 2017-12-26 RX ADMIN — CEFEPIME HYDROCHLORIDE 1 G: 1 INJECTION, SOLUTION INTRAVENOUS at 08:12

## 2017-12-26 RX ADMIN — GABAPENTIN 300 MG: 300 CAPSULE ORAL at 08:12

## 2017-12-26 RX ADMIN — IPRATROPIUM BROMIDE AND ALBUTEROL SULFATE 3 ML: .5; 3 SOLUTION RESPIRATORY (INHALATION) at 07:12

## 2017-12-26 RX ADMIN — APIXABAN 2.5 MG: 2.5 TABLET, FILM COATED ORAL at 08:12

## 2017-12-26 RX ADMIN — ASPIRIN 325 MG ORAL TABLET 325 MG: 325 PILL ORAL at 08:12

## 2017-12-26 RX ADMIN — IPRATROPIUM BROMIDE AND ALBUTEROL SULFATE 3 ML: .5; 3 SOLUTION RESPIRATORY (INHALATION) at 01:12

## 2017-12-26 RX ADMIN — ACETAMINOPHEN 1000 MG: 10 INJECTION, SOLUTION INTRAVENOUS at 06:12

## 2017-12-26 NOTE — HPI
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  He lives alone in [place], [place], and his son, Joseph Valerio Jr ([phone]) checks on him frequently.  He is retired and used to work as a  and an  in [employer].  He was exposed to asbestos.  He smoked 3.5 packs per day around 30 years and quit in 1990.  He denies use of alcohol or illicit drugs.  His primary care physician is Dr. Thompson Prince. His last clinic visit was on 12/20/2017 and Dr. Prince ordered a CT chest scan: "Mild persistent peribronchial tree in bud nodularity, most pronounced in the right upper and lower lobes. Findings could reflect atypical infectious etiology, inflammation, or sequela of aspiration."  He's had previous short hospitalizations for pneumonia and saw Pulmonology, Dr. Ho, on 7/28/2017. He ordered repeat CT scan in September 2017 (not done) and PFTs at next visit (1/23/18).               He was hospitalized at [facility] from 11/6/17-11/8/17 for rhabdomyolysis.  He was discharged home with home health.              He was taken to [facility] Emergency Department the morning of 12/26/17 with hypoxia and lethargy.  His oxygen saturation was 85% per EMS, who placed him on CPAP, which improved his hypoxia.  His son reported that he was disoriented for the past 2 days and did not recognize him the night before.  He had a productive cough with thick white sputum.  In the ED, he was hypertensive with blood pressure of 204/90.  He was placed on BiPAP.  WBC count was 14,590.  Troponin was 0.084 and BNP was 365, but his BNP has been similarly high in the past.  Chest x-ray showed a right " costophrenic angle and right lung base opacity.  He was given cefepime and vancomycin.  He was weaned to room air and blood pressures decreased before admission to [facility].  His son reported that Mr. Valerio's cognition had been waxing and waning for over 2 months, and believed that he needed placement in a nursing facility at least temporarily because he was unable to take full-time care of him at this time.

## 2017-12-26 NOTE — ED NOTES
Pt. arrived via [facility]. Pt. arrived to ED on CPAP. EMS called out to pt's home by son for altered mental status. Patient lives alone. Son states pt. did not recognize him around 1900 last night which was unusual for patient. Pt has been progressively forgetful and disoriented. Son believes pt has new onset of early dementia. Son states pt was last seen normal around 1500 by his wife and kids. EMS states upon their arrival pt was hypoxic and lethargic with an O2 sat in the 80s on RA. EMS also states pt had productive cough with white thick sputum. Pt. Arrived lethargic and only responsive to voice. Respiratory at bedside to place pt on BiPAP.

## 2017-12-26 NOTE — ASSESSMENT & PLAN NOTE
Pt states does not use CPAP at home regularly. Did not tolerate BiPap earlier today so will hold for now.  Supplemental oxygen as needed to keep sats > 92%.

## 2017-12-26 NOTE — ED NOTES
Bedside report received from RON Boo. Pt is awake, alert, tolerating bipap well. Denies any complaints. O2 sat. 100%. Afib on monitor. Skin is warm, dry. Son is at bedside. Will continue to monitor closely.

## 2017-12-26 NOTE — ED PROVIDER NOTES
Encounter Date: 12/26/2017       History     Chief Complaint   Patient presents with    Respiratory Distress     ems called for altered mental status. pt. was found in resp. distress sao2-80's. pt. arrives on cpap. pt. is lethargic     82-year-old male brought to the emergency department by EMS for shortness of breath and altered mental status.  EMS reports family indicates that the patient began getting somewhat confused one or 2 days ago.  His shortness of breath also began one or 2 days ago.  It is slowly worsened until tonight when the patient was found with increased work of breathing.  EMS reports initial oxygen saturation at 85%.  They placed patient on CPAP with significant improvement in oxygenation.          Review of patient's allergies indicates:  No Known Allergies  Past Medical History:   Diagnosis Date    *Atrial fibrillation     Allergy     Aortic sclerosis     BPH (benign prostatic hypertrophy)     CAD (coronary artery disease) 7/31/2012    Calcium nephrolithiasis     Chronic allergic rhinitis 5/28/2013    Chronic anticoagulation - apixaban  11/7/2017    For AFib    Chronic atrial fibrillation 10/16/2014    CKD (chronic kidney disease), stage III 10/16/2014    Colon polyps     Coronary artery disease     CPAP (continuous positive airway pressure) dependence     Degenerative disc disease     Diabetes mellitus     Encephalopathy, toxic 6/19/2017    Erectile dysfunction 8/20/2012    Hiatal hernia     HLD (hyperlipidemia) 7/31/2012    Hyperlipidemia     Hyperprolactinemia 12/29/2014    Hypertension     Increased prolactin level 9/17/2012    39.9 (3-15 range)     Increased prolactin level 9/17/2012    39.9 (3-15 range)     Lumbar disc disease 7/31/2012    Male hypogonadism 9/12/2012    Male hypogonadism 9/12/2012    Obesity 7/31/2012    S/p Gastric Bypass Surgery     Peripheral vascular disease     Pituitary microadenoma 2/26/2013    Pituitary microadenoma 2/26/2013     Pneumonia, community acquired 6/18/2017    Proteinuria     Renal insufficiency     Renovascular hypertension 7/31/2012    Severe sepsis 6/19/2017    Sleep apnea     Type 2 diabetes mellitus with stage 3 chronic kidney disease, with long-term current use of insulin 7/31/2012    Venous insufficiency      Past Surgical History:   Procedure Laterality Date    CATARACT EXTRACTION, BILATERAL      CHOLECYSTECTOMY      GASTRIC BYPASS       History reviewed. No pertinent family history.  Social History   Substance Use Topics    Smoking status: Former Smoker     Packs/day: 4.00     Years: 30.00     Quit date: 1/9/1990    Smokeless tobacco: Never Used      Comment: pt was up to 4 packs a day before.    Alcohol use No     Review of Systems   Unable to perform ROS: Acuity of condition       Physical Exam     Initial Vitals   BP Pulse Resp Temp SpO2   12/26/17 0506 12/26/17 0451 12/26/17 0451 12/26/17 0506 12/26/17 0451   (!) 204/90 96 20 100.3 °F (37.9 °C) 100 %      MAP       12/26/17 0506       128         Physical Exam    Nursing note and vitals reviewed.  Constitutional: He appears well-developed and well-nourished.   HENT:   Head: Normocephalic and atraumatic.   Oropharynx clear; Dry MM   Eyes: Conjunctivae and EOM are normal. Pupils are equal, round, and reactive to light.   Neck: Normal range of motion. Neck supple. No tracheal deviation present.   Cardiovascular: Normal rate, normal heart sounds and intact distal pulses.   Irregular rhythm    Pulmonary/Chest: He is in respiratory distress. He has no wheezes. He has no rhonchi. He has rales.   Cough productive of whitish sputum noted   Abdominal: Soft. Bowel sounds are normal. He exhibits no distension.   Musculoskeletal: He exhibits edema (2+ pitting edema to B/L LE).   Neurological: No cranial nerve deficit or sensory deficit. GCS eye subscore is 2. GCS verbal subscore is 3. GCS motor subscore is 5.   Skin: Skin is warm and dry.         ED Course    Procedures  Labs Reviewed   ISTAT PROCEDURE - Abnormal; Notable for the following:        Result Value    POC PH 7.236 (*)     POC PCO2 66.8 (*)     POC PO2 23 (*)     POC HCO3 28.3 (*)     POC SATURATED O2 29 (*)     POC TCO2 30 (*)     All other components within normal limits   CULTURE, URINE   CULTURE, BLOOD   CULTURE, BLOOD   CBC W/ AUTO DIFFERENTIAL   COMPREHENSIVE METABOLIC PANEL   URINALYSIS   INFLUENZA A AND B ANTIGEN   DRUG SCREEN PANEL, URINE EMERGENCY   ALCOHOL,MEDICAL (ETHANOL)   LACTIC ACID, PLASMA   PROTIME-INR   TROPONIN I   B-TYPE NATRIURETIC PEPTIDE   AMMONIA   POCT GLUCOSE MONITORING CONTINUOUS     EKG Readings: (Independently Interpreted)   Initial Reading: No STEMI. Previous EKG: Compared with most recent EKG Previous EKG Date: 11/10/17 (Nonspecific changes). Rhythm: Atrial Fibrillation. Heart Rate: 83. Ectopy: PVCs. ST Segments: Normal ST Segments. T Waves: Normal. Axis: Normal.          Medical Decision Making:   Initial Assessment:   82-year-old male brought to the emergency department by EMS for shortness of breath and confusion  Differential Diagnosis:   Sepsis, hypercapnia, hypoxia, pneumonia, urinary tract infection, substance abuse, encephalopathy,  ED Management:  I looked at the patient's x-ray, which is concerning for a right sided infiltrate.  I will order vacant Zosyn, the patient has a broad workup pending and I will hand of his management and evaluation to Dr. Bautista. Please see her progress note for results and disposition and management of the patient.                    ED Course      Clinical Impression:   Diagnoses of Shortness of breath, Acute respiratory failure with hypoxia, Elevated troponin, and Nonrheumatic aortic valve disorder were pertinent to this visit.                           Deon Gonzales MD  12/28/17 0426

## 2017-12-26 NOTE — PROGRESS NOTES
[facility]  Chart Review Note    Joseph Valerio is a 82 y.o. 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  He lives alone in [place], [place], and his son checks on him frequently.  He is retired and used to work as a  and an  in [employer].  He was exposed to asbestos.  He smoked 3.5 packs per day around 30 years and quit in 1990.  His primary care physician is Dr. Thompson Prince.     He was hospitalized at [facility] from 11/6/17-11/8/17 for rhabdomyolysis.  He was discharged home with home health.   He was taken to [facility] Emergency Department the morning of 12/26/17 with hypoxia and lethargy.  His oxygen saturation was 85% per EMS, who placed him on CPAP, which improved his hypoxia.  His son reported that he was disoriented for the past 2 days and did not recognize him the night before.  He had a productive cough with thick white sputum.  In the ED, he was hypertensive with blood pressure of 204/90.  He was placed on BiPAP.  WBC count was 14,590.  Troponin was 0.084 and BNP was 365, but his BNP has been similarly high in the past.  Chest x-ray showed a right costophrenic angle and right lung base opacity.  He was given cefepime and vancomycin.  He was weaned to room air and blood pressures decreased.      Assessment: Right lower lobe pneumonia, community acquired vs aspiration.  Initially hypoxic, resolved.  Inability to take care of  self.  Plan: Antibiotics, aspiration precautions.  Therapy evaluations.

## 2017-12-26 NOTE — ASSESSMENT & PLAN NOTE
Check blood glucose AC and HS and use SSI.  A1c 5.6% on 12/13/17. Pt states that he takes Lantus 25 units daily at home. Hold here for now to minimize risk of hypoglycemia.   Diabetic diet.

## 2017-12-26 NOTE — ED NOTES
Pt is tolerating nasal cannula well. Pt appears less restless. O2 sat. 100% on 2L via nasal cannula. States he's ready to go home.

## 2017-12-26 NOTE — SUBJECTIVE & OBJECTIVE
Past Medical History:   Diagnosis Date    Allergy     Aortic sclerosis     BPH (benign prostatic hypertrophy)     CAD (coronary artery disease) 7/31/2012    Calcium nephrolithiasis     Chronic allergic rhinitis 5/28/2013    Chronic anticoagulation - apixaban  11/7/2017    For AFib    Chronic atrial fibrillation 10/16/2014    CKD (chronic kidney disease), stage III 10/16/2014    Colon polyps     Coronary artery disease     CPAP (continuous positive airway pressure) dependence     Degenerative disc disease     Encephalopathy, toxic 6/19/2017    Erectile dysfunction 8/20/2012    Hiatal hernia     HLD (hyperlipidemia) 7/31/2012    Hyperprolactinemia 12/29/2014    Hypertension     Increased prolactin level 9/17/2012    39.9 (3-15 range)     Lumbar disc disease 7/31/2012    Male hypogonadism 9/12/2012    Obesity 7/31/2012    S/p Gastric Bypass Surgery     Peripheral vascular disease     Pituitary microadenoma 2/26/2013    Pneumonia, community acquired 6/18/2017    Proteinuria     Renal insufficiency     Renovascular hypertension 7/31/2012    Severe sepsis 6/19/2017    Sleep apnea     Traumatic rhabdomyolysis 11/7/2017    Type 2 diabetes mellitus with stage 3 chronic kidney disease, with long-term current use of insulin 7/31/2012    Venous insufficiency        Past Surgical History:   Procedure Laterality Date    CATARACT EXTRACTION, BILATERAL      CHOLECYSTECTOMY      CYSTOLITHOTOMY  09/23/2005    GASTRIC BYPASS  03/28/2007    Jg-en-Y    RADIOFREQUENCY ABLATION  10/18/2005    right subeustachian isthums ablation for atrial flutter       Review of patient's allergies indicates:  No Known Allergies    No current facility-administered medications on file prior to encounter.      Current Outpatient Prescriptions on File Prior to Encounter   Medication Sig    acetaminophen (TYLENOL) 500 MG tablet Take 2 tablets (1,000 mg total) by mouth every 8 (eight) hours as needed for Pain. Pain  Scaled < 7    apixaban 2.5 mg Tab Take 1 tablet (2.5 mg total) by mouth 2 (two) times daily.    COREG CR 20 mg 24 hr capsule Take 20 mg by mouth once daily.     furosemide (LASIX) 40 MG tablet Take 1 tablet (40 mg total) by mouth once daily.    gabapentin (NEURONTIN) 300 MG capsule TAKE 1 CAPSULE BY MOUTH AT BEDTIME FOR DIABETIC NEUROPATHY    insulin glargine (LANTUS SOLOSTAR) 100 unit/mL (3 mL) InPn pen 25 units SQ in AM for Diabetes    lancets (ACCU-CHEK SOFTCLIX LANCETS) Misc 1 strip by Misc.(Non-Drug; Combo Route) route 2 (two) times daily. (Patient taking differently: 1 strip by Misc.(Non-Drug; Combo Route) route once daily. )    lansoprazole (PREVACID SOLUTAB) 30 MG disintegrating tablet Take 1 tablet (30 mg total) by mouth once daily. 1 Tablet,Rapid Dissolve, DR Sublingual Every day.  for acid reflux (Patient taking differently: Take 30 mg by mouth daily as needed (acid reflux). )    losartan (COZAAR) 100 MG tablet Take 1 tablet (100 mg total) by mouth once daily.    oxyCODONE (OXYCONTIN) 30 mg TR12 12 hr tablet Take 1 tablet (30 mg total) by mouth every 12 (twelve) hours.    tamsulosin (FLOMAX) 0.4 mg Cp24 Take 1 capsule (0.4 mg total) by mouth once daily. 1 Capsule, Sust. Release 24 hr Oral Every day.  for prostate (Patient taking differently: Take 0.4 mg by mouth once daily. )    walker Misc 1 Rolling Walker with a Seat    blood sugar diagnostic Strp Check glucose 2-3x/day before meals and/or bedtime (Patient taking differently: Check glucose 1-2 times daily)    calcium-vitamin D 600 mg(1,500mg) -400 unit Tab 1 tab daily    ferrous sulfate dried (SLOW FE) 160 mg (50 mg iron) TbSR Take 1 tablet (160 mg total) by mouth once daily.    multivitamin (THERAGRAN) per tablet Take 1 tablet by mouth once daily.    timolol maleate 0.5% (TIMOPTIC) 0.5 % Drop Place 1 drop into both eyes once daily.     TRAVATAN Z 0.004 % Drop Place 1 drop into both eyes every evening.     [DISCONTINUED] gentamicin  (GARAMYCIN) 0.3 % ophthalmic solution Place 2 drops into both eyes 4 (four) times daily. For 7 days    [DISCONTINUED] moxifloxacin (AVELOX) 400 mg tablet Take 1 tablet (400 mg total) by mouth once daily.    [DISCONTINUED] SITagliptin (JANUVIA) 50 MG Tab Take 1 tablet (50 mg total) by mouth once daily. For Diabetes     Family History     None        Social History Main Topics    Smoking status: Former Smoker     Packs/day: 4.00     Years: 30.00     Quit date: 1/9/1990    Smokeless tobacco: Never Used      Comment: pt was up to 4 packs a day before.    Alcohol use No    Drug use: No    Sexual activity: Not on file     Review of Systems   Constitutional: Positive for fatigue. Negative for chills and fever.   HENT: Negative for congestion, postnasal drip, sneezing and sore throat.    Eyes: Negative for discharge, redness and itching.   Respiratory: Positive for cough and shortness of breath. Negative for wheezing.    Cardiovascular: Positive for leg swelling. Negative for chest pain and palpitations.   Gastrointestinal: Negative for abdominal distention, abdominal pain, blood in stool, constipation, diarrhea, nausea and vomiting.   Endocrine: Negative for polydipsia, polyphagia and polyuria.   Genitourinary: Negative for difficulty urinating, dysuria, flank pain, frequency, hematuria and urgency.   Musculoskeletal: Negative for arthralgias and myalgias.   Skin: Negative for pallor, rash and wound.   Neurological: Positive for weakness. Negative for dizziness, syncope, light-headedness, numbness and headaches.   Psychiatric/Behavioral: Positive for confusion. Negative for agitation. The patient is not nervous/anxious.      Objective:     Vital Signs (Most Recent):  Temp: 98.3 °F (36.8 °C) (12/26/17 1648)  Pulse: 71 (12/26/17 1620)  Resp: 13 (12/26/17 1620)  BP: (!) 93/40 (12/26/17 1620)  SpO2: (!) 94 % (12/26/17 1620) Vital Signs (24h Range):  Temp:  [98.3 °F (36.8 °C)-100.3 °F (37.9 °C)] 98.3 °F (36.8 °C)  Pulse:   [] 71  Resp:  [10-25] 13  SpO2:  [84 %-100 %] 94 %  BP: ()/(38-90) 93/40     Weight: 100.7 kg (222 lb)  Body mass index is 31.85 kg/m².    Physical Exam   Constitutional: He appears well-developed and well-nourished. No distress.   HENT:   Head: Normocephalic and atraumatic.   Mouth/Throat: Oropharynx is clear and moist. No oropharyngeal exudate.   Eyes: Conjunctivae and EOM are normal. Pupils are equal, round, and reactive to light. Left eye exhibits no discharge.   Neck: Normal range of motion. Neck supple. No JVD present. No thyromegaly present.   Cardiovascular:   Murmur heard.  Irregularly irregular rhythm    Pulmonary/Chest: Effort normal and breath sounds normal. No respiratory distress. He has no wheezes. He has no rales. He exhibits no tenderness.   Abdominal: Soft. Bowel sounds are normal. He exhibits no distension. There is no tenderness. There is no rebound and no guarding.   Musculoskeletal: He exhibits edema.   Neurological: He is alert.   Oriented to person, place and approximate date and time   Skin: Skin is warm and dry. He is not diaphoretic. There is erythema.   Bilateral lower extremity erythema consistent with chronic venous insufficiency    Psychiatric: He has a normal mood and affect.   Tangential thinking. Intermittent agitation.    Nursing note and vitals reviewed.        CRANIAL NERVES     CN III, IV, VI   Pupils are equal, round, and reactive to light.  Extraocular motions are normal.        Significant Labs:   CBC:   Recent Labs  Lab 12/26/17  0526   WBC 14.59*   HGB 10.0*   HCT 33.6*        CMP:   Recent Labs  Lab 12/26/17  0526      K 4.1      CO2 23   *   BUN 38*   CREATININE 3.0*   CALCIUM 8.0*   PROT 6.3   ALBUMIN 2.8*   BILITOT 0.8   ALKPHOS 90   AST 34   ALT 29   ANIONGAP 10   EGFRNONAA 19*     Cardiac Markers:   Recent Labs  Lab 12/26/17  0526   *     Troponin:   Recent Labs  Lab 12/26/17  0526   TROPONINI 0.084*     Urine Studies:    Recent Labs  Lab 12/26/17  0553   COLORU Yellow   APPEARANCEUA Clear   PHUR 5.0   SPECGRAV 1.025   PROTEINUA Trace*   GLUCUA Negative   KETONESU Negative   BILIRUBINUA Negative   OCCULTUA Negative   NITRITE Negative   UROBILINOGEN Negative   LEUKOCYTESUR Negative       Significant Imaging:     Imaging Results          X-Ray Chest AP Portable (Final result)  Result time 12/26/17 05:59:03    Final result by Moriah Shah MD (12/26/17 05:59:03)                 Impression:      Increased opacity and obscuration of the right costophrenic angle with patchy opacity of the right lung base, possibly representing component of pleural fluid and developing infiltrate.      Electronically signed by: MORIAH SHAH  Date:     12/26/17  Time:    05:59              Narrative:    Comparison: None available    Technique: Single AP chest radiograph.    Findings: The cardiomediastinal silhouette is enlarged, unchanged prior study. The visualized airway is unremarkable.  There is increased opacity and obscuration of the right costophrenic angle with additional patchy airspace opacities in the right lung base, suggestive of underlying pleural fluid with consolidative change, which can be seen with underlying infectious process such as pneumonia and/or aspiration.  No evidence of pneumothorax.  Previously identified right rib fractures not as well delineated on today's exam.  Osseous structures demonstrate no acute abnormality.

## 2017-12-26 NOTE — TELEPHONE ENCOUNTER
Adali/Lily, It is ok to print and fax my last clinic note.  Pt is however in the hospital now with Hypoxia so the medication list and assessment/plan may change. Thanks

## 2017-12-26 NOTE — ASSESSMENT & PLAN NOTE
Hyperlipidemia. Venous stasis of lower extremities.   Denies CP and leg pain.  No home statin or antiplatelet.  Lipid panel 12/13/2017: , HDL 25, LDL 60, TG 85.  On apixaban for chronic atrial fibrillation.  Monitor on telemetry.

## 2017-12-26 NOTE — ASSESSMENT & PLAN NOTE
Pulmonary hypertension  Pt appears compensated at present. Prescribed furosemide 40 mg daily, ?coreg 20 mg day (though not on list from Pharmacy), and losartan 100 mg daily. Holding furosemide and losartan for JOHN PAUL. Monitor volume status. Monitor I&O and daily weights.

## 2017-12-26 NOTE — H&P
"[facility] Medicine  History & Physical    Patient Name: Joseph Valerio  MRN: [id number]  Admission Date: 12/26/2017  Attending Physician: Anton Wolff MD  Primary Care Provider: Thompson Stiles MD         Patient information was obtained from patient, relative(s), past medical records and ER records.     Subjective:     Principal Problem:Pneumonia of right lower lobe due to infectious organism    Chief Complaint:   Chief Complaint   Patient presents with    Respiratory Distress     ems called for altered mental status. pt. was found in resp. distress sao2-80's. pt. arrives on cpap. pt. 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  VILMA beebe lives alone in [place], [place], and his son, Joseph Valerio Jr ([phone]) checks on him frequently.  He is retired and used to work as a  and an  in [employer].  He was exposed to asbestos.  He smoked 3.5 packs per day around 30 years and quit in 1990.   He denies use of alcohol or illicit drugs.  His primary care physician is Dr. Thompson Prince. His last clinic visit was on 12/20/2017 and Dr. Prince ordered a CT chest scan: "Mild persistent peribronchial tree in bud nodularity, most pronounced in the right upper and lower lobes. Findings could reflect atypical infectious etiology, inflammation, or sequela of aspiration."  He's had previous short hospitalizations for pneumonia and saw Pulmonology, Dr. Ho, on 7/28/2017. He ordered repeat CT scan in September 2017 (not done) and PFTs at next visit (1/23/18).                 He was hospitalized at [facility] from 11/6/17-11/8/17 for rhabdomyolysis. "  He was discharged home with home health.                He was taken to [facility] Emergency Department the morning of 12/26/17 with hypoxia and lethargy.  His oxygen saturation was 85% per EMS, who placed him on CPAP, which improved his hypoxia.  His son reported that he was disoriented for the past 2 days and did not recognize him the night before.  He had a productive cough with thick white sputum.  In the ED, he was hypertensive with blood pressure of 204/90.  He was placed on BiPAP.  WBC count was 14,590.  Troponin was 0.084 and BNP was 365, but his BNP has been similarly high in the past.  Chest x-ray showed a right costophrenic angle and right lung base opacity.  He was given cefepime and vancomycin.  He was weaned to room air and blood pressures decreased before admission to [facility].      His son reports that the patient's mental status has been waxing and waning for > 2 months. He states he believes he needs placement in a facility at least temporarily as he is unable to take full-time care of him at this time.     Past Medical History:   Diagnosis Date    Allergy     Aortic sclerosis     BPH (benign prostatic hypertrophy)     CAD (coronary artery disease) 7/31/2012    Calcium nephrolithiasis     Chronic allergic rhinitis 5/28/2013    Chronic anticoagulation - apixaban  11/7/2017    For AFib    Chronic atrial fibrillation 10/16/2014    CKD (chronic kidney disease), stage III 10/16/2014    Colon polyps     Coronary artery disease     CPAP (continuous positive airway pressure) dependence     Degenerative disc disease     Encephalopathy, toxic 6/19/2017    Erectile dysfunction 8/20/2012    Hiatal hernia     HLD (hyperlipidemia) 7/31/2012    Hyperprolactinemia 12/29/2014    Hypertension     Increased prolactin level 9/17/2012    39.9 (3-15 range)     Lumbar disc disease 7/31/2012    Male hypogonadism 9/12/2012    Obesity 7/31/2012    S/p Gastric  Bypass Surgery     Peripheral vascular disease     Pituitary microadenoma 2/26/2013    Pneumonia, community acquired 6/18/2017    Proteinuria     Renal insufficiency     Renovascular hypertension 7/31/2012    Severe sepsis 6/19/2017    Sleep apnea     Traumatic rhabdomyolysis 11/7/2017    Type 2 diabetes mellitus with stage 3 chronic kidney disease, with long-term current use of insulin 7/31/2012    Venous insufficiency        Past Surgical History:   Procedure Laterality Date    CATARACT EXTRACTION, BILATERAL      CHOLECYSTECTOMY      CYSTOLITHOTOMY  09/23/2005    GASTRIC BYPASS  03/28/2007    Jg-en-Y    RADIOFREQUENCY ABLATION  10/18/2005    right subeustachian isthums ablation for atrial flutter       Review of patient's allergies indicates:  No Known Allergies    No current facility-administered medications on file prior to encounter.      Current Outpatient Prescriptions on File Prior to Encounter   Medication Sig    acetaminophen (TYLENOL) 500 MG tablet Take 2 tablets (1,000 mg total) by mouth every 8 (eight) hours as needed for Pain. Pain Scaled < 7    apixaban 2.5 mg Tab Take 1 tablet (2.5 mg total) by mouth 2 (two) times daily.    COREG CR 20 mg 24 hr capsule Take 20 mg by mouth once daily.     furosemide (LASIX) 40 MG tablet Take 1 tablet (40 mg total) by mouth once daily.    gabapentin (NEURONTIN) 300 MG capsule TAKE 1 CAPSULE BY MOUTH AT BEDTIME FOR DIABETIC NEUROPATHY    insulin glargine (LANTUS SOLOSTAR) 100 unit/mL (3 mL) InPn pen 25 units SQ in AM for Diabetes    lancets (ACCU-CHEK SOFTCLIX LANCETS) Misc 1 strip by Misc.(Non-Drug; Combo Route) route 2 (two) times daily. (Patient taking differently: 1 strip by Misc.(Non-Drug; Combo Route) route once daily. )    lansoprazole (PREVACID SOLUTAB) 30 MG disintegrating tablet Take 1 tablet (30 mg total) by mouth once daily. 1 Tablet,Rapid Dissolve, DR Sublingual Every day.  for acid reflux (Patient taking differently: Take 30 mg by  mouth daily as needed (acid reflux). )    losartan (COZAAR) 100 MG tablet Take 1 tablet (100 mg total) by mouth once daily.    oxyCODONE (OXYCONTIN) 30 mg TR12 12 hr tablet Take 1 tablet (30 mg total) by mouth every 12 (twelve) hours.    tamsulosin (FLOMAX) 0.4 mg Cp24 Take 1 capsule (0.4 mg total) by mouth once daily. 1 Capsule, Sust. Release 24 hr Oral Every day.  for prostate (Patient taking differently: Take 0.4 mg by mouth once daily. )    walker Misc 1 Rolling Walker with a Seat    blood sugar diagnostic Strp Check glucose 2-3x/day before meals and/or bedtime (Patient taking differently: Check glucose 1-2 times daily)    calcium-vitamin D 600 mg(1,500mg) -400 unit Tab 1 tab daily    ferrous sulfate dried (SLOW FE) 160 mg (50 mg iron) TbSR Take 1 tablet (160 mg total) by mouth once daily.    multivitamin (THERAGRAN) per tablet Take 1 tablet by mouth once daily.    timolol maleate 0.5% (TIMOPTIC) 0.5 % Drop Place 1 drop into both eyes once daily.     TRAVATAN Z 0.004 % Drop Place 1 drop into both eyes every evening.     [DISCONTINUED] gentamicin (GARAMYCIN) 0.3 % ophthalmic solution Place 2 drops into both eyes 4 (four) times daily. For 7 days    [DISCONTINUED] moxifloxacin (AVELOX) 400 mg tablet Take 1 tablet (400 mg total) by mouth once daily.    [DISCONTINUED] SITagliptin (JANUVIA) 50 MG Tab Take 1 tablet (50 mg total) by mouth once daily. For Diabetes     Family History     None        Social History Main Topics    Smoking status: Former Smoker     Packs/day: 4.00     Years: 30.00     Quit date: 1/9/1990    Smokeless tobacco: Never Used      Comment: pt was up to 4 packs a day before.    Alcohol use No    Drug use: No    Sexual activity: Not on file     Review of Systems   Constitutional: Positive for fatigue. Negative for chills and fever.   HENT: Negative for congestion, postnasal drip, sneezing and sore throat.    Eyes: Negative for discharge, redness and itching.   Respiratory: Positive  for cough and shortness of breath. Negative for wheezing.    Cardiovascular: Positive for leg swelling. Negative for chest pain and palpitations.   Gastrointestinal: Negative for abdominal distention, abdominal pain, blood in stool, constipation, diarrhea, nausea and vomiting.   Endocrine: Negative for polydipsia, polyphagia and polyuria.   Genitourinary: Negative for difficulty urinating, dysuria, flank pain, frequency, hematuria and urgency.   Musculoskeletal: Negative for arthralgias and myalgias.   Skin: Negative for pallor, rash and wound.   Neurological: Positive for weakness. Negative for dizziness, syncope, light-headedness, numbness and headaches.   Psychiatric/Behavioral: Positive for confusion. Negative for agitation. The patient is not nervous/anxious.      Objective:     Vital Signs (Most Recent):  Temp: 98.3 °F (36.8 °C) (12/26/17 1648)  Pulse: 71 (12/26/17 1620)  Resp: 13 (12/26/17 1620)  BP: (!) 93/40 (12/26/17 1620)  SpO2: (!) 94 % (12/26/17 1620) Vital Signs (24h Range):  Temp:  [98.3 °F (36.8 °C)-100.3 °F (37.9 °C)] 98.3 °F (36.8 °C)  Pulse:  [] 71  Resp:  [10-25] 13  SpO2:  [84 %-100 %] 94 %  BP: ()/(38-90) 93/40     Weight: 100.7 kg (222 lb)  Body mass index is 31.85 kg/m².    Physical Exam   Constitutional: He appears well-developed and well-nourished. No distress.   HENT:   Head: Normocephalic and atraumatic.   Mouth/Throat: Oropharynx is clear and moist. No oropharyngeal exudate.   Eyes: Conjunctivae and EOM are normal. Pupils are equal, round, and reactive to light. Left eye exhibits no discharge.   Neck: Normal range of motion. Neck supple. No JVD present. No thyromegaly present.   Cardiovascular:   Murmur heard.  Irregularly irregular rhythm    Pulmonary/Chest: Effort normal and breath sounds normal. No respiratory distress. He has no wheezes. He has no rales. He exhibits no tenderness.   Abdominal: Soft. Bowel sounds are normal. He exhibits no distension. There is no  tenderness. There is no rebound and no guarding.   Musculoskeletal: He exhibits edema.   Neurological: He is alert.   Oriented to person, place and approximate date and time   Skin: Skin is warm and dry. He is not diaphoretic. There is erythema.   Bilateral lower extremity erythema consistent with chronic venous insufficiency    Psychiatric: He has a normal mood and affect.   Tangential thinking. Intermittent agitation.    Nursing note and vitals reviewed.        CRANIAL NERVES     CN III, IV, VI   Pupils are equal, round, and reactive to light.  Extraocular motions are normal.        Significant Labs:   CBC:   Recent Labs  Lab 12/26/17  0526   WBC 14.59*   HGB 10.0*   HCT 33.6*        CMP:   Recent Labs  Lab 12/26/17 0526      K 4.1      CO2 23   *   BUN 38*   CREATININE 3.0*   CALCIUM 8.0*   PROT 6.3   ALBUMIN 2.8*   BILITOT 0.8   ALKPHOS 90   AST 34   ALT 29   ANIONGAP 10   EGFRNONAA 19*     Cardiac Markers:   Recent Labs  Lab 12/26/17 0526   *     Troponin:   Recent Labs  Lab 12/26/17 0526   TROPONINI 0.084*     Urine Studies:   Recent Labs  Lab 12/26/17  0553   COLORU Yellow   APPEARANCEUA Clear   PHUR 5.0   SPECGRAV 1.025   PROTEINUA Trace*   GLUCUA Negative   KETONESU Negative   BILIRUBINUA Negative   OCCULTUA Negative   NITRITE Negative   UROBILINOGEN Negative   LEUKOCYTESUR Negative       Significant Imaging:     Imaging Results          X-Ray Chest AP Portable (Final result)  Result time 12/26/17 05:59:03    Final result by Moriah Shah MD (12/26/17 05:59:03)                 Impression:      Increased opacity and obscuration of the right costophrenic angle with patchy opacity of the right lung base, possibly representing component of pleural fluid and developing infiltrate.      Electronically signed by: MORIAH SHAH  Date:     12/26/17  Time:    05:59              Narrative:    Comparison: None available    Technique: Single AP chest radiograph.    Findings: The  cardiomediastinal silhouette is enlarged, unchanged prior study. The visualized airway is unremarkable.  There is increased opacity and obscuration of the right costophrenic angle with additional patchy airspace opacities in the right lung base, suggestive of underlying pleural fluid with consolidative change, which can be seen with underlying infectious process such as pneumonia and/or aspiration.  No evidence of pneumothorax.  Previously identified right rib fractures not as well delineated on today's exam.  Osseous structures demonstrate no acute abnormality.                                Assessment/Plan:     * Pneumonia of right lower lobe due to infectious organism    Acute respiratory failure with hypoxia   Pt presents from home with report of hypoxia, confusion and cough. CXR suggests RLL infiltrate also noted on CT chest on 12/20/17.  Slightly elevated WBC 14K. Afebrile, non-toxic appearing. No elevation in lactic acid. Admitted earlier this year with same and sent to Pulmonology, Dr. Collins, who recommends PFTs when patient is not acutely ill.  Has f/u with Pulmonology next month. On room air now.  Given vanc and cefepime in ED. Random vanc level tomorrow. Continue cefepime for healthcare-associated pneumonia as pt hospitalized 11/6-11/8/17 and received abx since then for pneumonia.  Q4 hour nebs, PRN supplemental oxygen.           JOHN PAUL (acute kidney injury)    CKD 3-4  Appears baseline creatinine is 1.8-2.1. 3.0 today with elevated BUN perhaps 2/2 infection, hypotension and poor PO intake. Pt on furosemide and losartan at home but unsure if actually taking. Avoid nephrotoxins and renally dose meds. Monitor. Renal ultrasound if not improving.            Biatrial enlargement    Pulmonary hypertension  Pt appears compensated at present. Prescribed furosemide 40 mg daily, ?coreg 20 mg day (though not on list from Pharmacy), and losartan 100 mg daily. Holding furosemide and losartan for JOHN PAUL. Monitor volume  status. Monitor I&O and daily weights.             Coronary artery disease involving native coronary artery of native heart without angina pectoris    Hyperlipidemia. Venous stasis of lower extremities.   Denies CP and leg pain.  No home statin or antiplatelet.  Lipid panel 12/13/2017: , HDL 25, LDL 60, TG 85.  On apixaban for chronic atrial fibrillation.  Monitor on telemetry.           Persistent atrial fibrillation    Irregular rhythm on exam. Rate controlled at present. Resume home apixaban 2.5 mg BID which was started on 7/10/17 by Cardiology at [facility]. Pt has Coreg CR 20 mg 24 hour capsule on home medication list but no recent fill at Pharmacy.  Hold BB for now with low BP.  Monitor on telemetry.           Essential hypertension    Documented 204/90 initially in ED that then decreased to 93/42 with no medication intervention. Perhaps elevated 2/2 anxiety.  Has coreg CR 20 mg 24 hour capsule once daily, losartan 100 mg daily on home list. Unsure if taking either. Hold both for now 2/2 hypotension and JOHN PAUL.  Monitor.           Obstructive sleep apnea on CPAP    Pt states does not use CPAP at home regularly. Did not tolerate BiPap earlier today so will hold for now.  Supplemental oxygen as needed to keep sats > 92%.           Type 2 diabetes mellitus with stage 3 chronic kidney disease, with long-term current use of insulin    Check blood glucose AC and HS and use SSI.  A1c 5.6% on 12/13/17. Pt states that he takes Lantus 25 units daily at home. Hold here for now to minimize risk of hypoglycemia.   Diabetic diet.           Benign prostatic hyperplasia    Resume home tamsulosin 0.4 mg daily. No  complaints.             VTE Risk Mitigation         Ordered     Medium Risk of VTE  Once      12/26/17 1551     Reason for No Pharmacological VTE Prophylaxis  Once      12/26/17 1551             Chacha Diaz PA-C  Department of Hospital Medicine   [facility]  Pager: [phone]

## 2017-12-26 NOTE — ED NOTES
Pt has been attempting to pull off bipap. Requests to have it removed. Placed on 2L O2 via nasal cannula. Pt is awake, alert, oriented x4 at this time, but has some confusion. Able to state year, date, and location, but forgets who the current president is and has some delayed responses to questions.

## 2017-12-26 NOTE — PROGRESS NOTES
Ref Range & Units 05:06    POC PH 7.35 - 7.45 7.236     POC PCO2 35 - 45 mmHg 66.8     POC PO2 40 - 60 mmHg 23     POC HCO3 24 - 28 mmol/L 28.3     POC BE -2 to 2 mmol/L 1    POC SATURATED O2 95 - 100 % 29     POC TCO2 24 - 29 mmol/L 30     Sample  VENOUS    Site  LR    Allens Test  Pass    DelSys  CPAP/BiPAP    Mode  BiPAP    FiO2  100    Sp02  100    IP  14    EP  7      PATIENT IS CURRENTLY ON BIPAP 14/7; 100%.   SPO2 %.   DOCTOR ACCEPTS VENOUS DRAW.

## 2017-12-26 NOTE — ASSESSMENT & PLAN NOTE
Acute respiratory failure with hypoxia   Pt presents from home with report of hypoxia, confusion and cough. CXR suggests RLL infiltrate also noted on CT chest on 12/20/17.  Slightly elevated WBC 14K. Afebrile, non-toxic appearing. No elevation in lactic acid. Admitted earlier this year with same and sent to Pulmonology, Dr. Collins, who recommends PFTs when patient is not acutely ill.  Has f/u with Pulmonology next month. On room air now.  Given vanc and cefepime in ED. Random vanc level tomorrow. Continue cefepime for healthcare-associated pneumonia as pt hospitalized 11/6-11/8/17 and received abx since then for pneumonia.  Q4 hour nebs, PRN supplemental oxygen.

## 2017-12-26 NOTE — ED NOTES
Dr. Gonzales gave nurse verbal okay to cancel existing ABG order and replace it with an order for a VBG since the respiratory therapist at the bedside already jaqueline a venous blood sample instead of an ABG.

## 2017-12-26 NOTE — ASSESSMENT & PLAN NOTE
CKD 3-4  Appears baseline creatinine is 1.8-2.1. 3.0 today with elevated BUN perhaps 2/2 infection, hypotension and poor PO intake. Pt on furosemide and losartan at home but unsure if actually taking. Avoid nephrotoxins and renally dose meds. Monitor. Renal ultrasound if not improving.

## 2017-12-26 NOTE — ED NOTES
Pt. Eyes fully opened. Pt. Able to follow nurse with eyes. Able to demonstrate weak bilateral hand grasp. Following commands but appears weak. Pt. staring at nurse but not answering questions.

## 2017-12-26 NOTE — ASSESSMENT & PLAN NOTE
Irregular rhythm on exam. Rate controlled at present. Resume home apixaban 2.5 mg BID which was started on 7/10/17 by Cardiology at [facility]. Pt has Coreg CR 20 mg 24 hour capsule on home medication list but no recent fill at Pharmacy.  Hold BB for now with low BP.  Monitor on telemetry.

## 2017-12-26 NOTE — ASSESSMENT & PLAN NOTE
Documented 204/90 initially in ED that then decreased to 93/42 with no medication intervention. Perhaps elevated 2/2 anxiety.  Has coreg CR 20 mg 24 hour capsule once daily, losartan 100 mg daily on home list. Unsure if taking either. Hold both for now 2/2 hypotension and JOHN PAUL.  Monitor.

## 2017-12-27 ENCOUNTER — TELEPHONE (OUTPATIENT)
Dept: INTERNAL MEDICINE | Facility: CLINIC | Age: 82
End: 2017-12-27

## 2017-12-27 PROBLEM — I27.20 PULMONARY HYPERTENSION: Status: RESOLVED | Noted: 2017-12-26 | Resolved: 2017-12-27

## 2017-12-27 PROBLEM — R79.89 ELEVATED TROPONIN: Status: ACTIVE | Noted: 2017-12-27

## 2017-12-27 LAB
ANION GAP SERPL CALC-SCNC: 8 MMOL/L
BACTERIA UR CULT: NO GROWTH
BASOPHILS # BLD AUTO: 0.03 K/UL
BASOPHILS NFR BLD: 0.3 %
BUN SERPL-MCNC: 44 MG/DL
CALCIUM SERPL-MCNC: 8.3 MG/DL
CHLORIDE SERPL-SCNC: 109 MMOL/L
CO2 SERPL-SCNC: 27 MMOL/L
CREAT SERPL-MCNC: 2.8 MG/DL
DIFFERENTIAL METHOD: ABNORMAL
EOSINOPHIL # BLD AUTO: 0.2 K/UL
EOSINOPHIL NFR BLD: 2 %
ERYTHROCYTE [DISTWIDTH] IN BLOOD BY AUTOMATED COUNT: 13.7 %
EST. GFR  (AFRICAN AMERICAN): 23 ML/MIN/1.73 M^2
EST. GFR  (NON AFRICAN AMERICAN): 20 ML/MIN/1.73 M^2
ESTIMATED PA SYSTOLIC PRESSURE: 12.67
GLOBAL PERICARDIAL EFFUSION: NORMAL
GLUCOSE SERPL-MCNC: 112 MG/DL
HCT VFR BLD AUTO: 31.3 %
HGB BLD-MCNC: 9.3 G/DL
LYMPHOCYTES # BLD AUTO: 1.5 K/UL
LYMPHOCYTES NFR BLD: 14.3 %
MAGNESIUM SERPL-MCNC: 1.9 MG/DL
MCH RBC QN AUTO: 27.1 PG
MCHC RBC AUTO-ENTMCNC: 29.7 G/DL
MCV RBC AUTO: 91 FL
MONOCYTES # BLD AUTO: 0.9 K/UL
MONOCYTES NFR BLD: 8.2 %
NEUTROPHILS # BLD AUTO: 7.9 K/UL
NEUTROPHILS NFR BLD: 74.8 %
PHOSPHATE SERPL-MCNC: 4.2 MG/DL
PLATELET # BLD AUTO: 243 K/UL
PMV BLD AUTO: 11.2 FL
POCT GLUCOSE: 120 MG/DL (ref 70–110)
POCT GLUCOSE: 234 MG/DL (ref 70–110)
POCT GLUCOSE: 272 MG/DL (ref 70–110)
POCT GLUCOSE: 281 MG/DL (ref 70–110)
POTASSIUM SERPL-SCNC: 4.3 MMOL/L
RBC # BLD AUTO: 3.43 M/UL
RETIRED EF AND QEF - SEE NOTES: 65 (ref 55–65)
SODIUM SERPL-SCNC: 144 MMOL/L
TRICUSPID VALVE REGURGITATION: NORMAL
TROPONIN I SERPL DL<=0.01 NG/ML-MCNC: 1.06 NG/ML
VANCOMYCIN SERPL-MCNC: 11.2 UG/ML
WBC # BLD AUTO: 10.61 K/UL

## 2017-12-27 PROCEDURE — 83735 ASSAY OF MAGNESIUM: CPT

## 2017-12-27 PROCEDURE — 84484 ASSAY OF TROPONIN QUANT: CPT

## 2017-12-27 PROCEDURE — 25000003 PHARM REV CODE 250: Performed by: PHYSICIAN ASSISTANT

## 2017-12-27 PROCEDURE — 36415 COLL VENOUS BLD VENIPUNCTURE: CPT

## 2017-12-27 PROCEDURE — 84100 ASSAY OF PHOSPHORUS: CPT

## 2017-12-27 PROCEDURE — 87205 SMEAR GRAM STAIN: CPT

## 2017-12-27 PROCEDURE — 97165 OT EVAL LOW COMPLEX 30 MIN: CPT

## 2017-12-27 PROCEDURE — 86580 TB INTRADERMAL TEST: CPT | Performed by: HOSPITALIST

## 2017-12-27 PROCEDURE — 25000003 PHARM REV CODE 250: Performed by: HOSPITALIST

## 2017-12-27 PROCEDURE — 94640 AIRWAY INHALATION TREATMENT: CPT

## 2017-12-27 PROCEDURE — 94761 N-INVAS EAR/PLS OXIMETRY MLT: CPT

## 2017-12-27 PROCEDURE — 99223 1ST HOSP IP/OBS HIGH 75: CPT | Mod: ,,, | Performed by: INTERNAL MEDICINE

## 2017-12-27 PROCEDURE — 85025 COMPLETE CBC W/AUTO DIFF WBC: CPT

## 2017-12-27 PROCEDURE — 93306 TTE W/DOPPLER COMPLETE: CPT

## 2017-12-27 PROCEDURE — 87070 CULTURE OTHR SPECIMN AEROBIC: CPT

## 2017-12-27 PROCEDURE — G8979 MOBILITY GOAL STATUS: HCPCS | Mod: CJ

## 2017-12-27 PROCEDURE — 80202 ASSAY OF VANCOMYCIN: CPT

## 2017-12-27 PROCEDURE — 63600175 PHARM REV CODE 636 W HCPCS: Performed by: HOSPITALIST

## 2017-12-27 PROCEDURE — 11000001 HC ACUTE MED/SURG PRIVATE ROOM

## 2017-12-27 PROCEDURE — 93306 TTE W/DOPPLER COMPLETE: CPT | Mod: 26,,, | Performed by: INTERNAL MEDICINE

## 2017-12-27 PROCEDURE — 80048 BASIC METABOLIC PNL TOTAL CA: CPT

## 2017-12-27 PROCEDURE — 25000242 PHARM REV CODE 250 ALT 637 W/ HCPCS: Performed by: HOSPITALIST

## 2017-12-27 PROCEDURE — G8978 MOBILITY CURRENT STATUS: HCPCS | Mod: CL

## 2017-12-27 PROCEDURE — 97161 PT EVAL LOW COMPLEX 20 MIN: CPT

## 2017-12-27 PROCEDURE — 97530 THERAPEUTIC ACTIVITIES: CPT

## 2017-12-27 RX ORDER — AMOXICILLIN AND CLAVULANATE POTASSIUM 875; 125 MG/1; MG/1
1 TABLET, FILM COATED ORAL EVERY 12 HOURS
Status: DISCONTINUED | OUTPATIENT
Start: 2017-12-27 | End: 2017-12-27

## 2017-12-27 RX ORDER — ASPIRIN 81 MG/1
81 TABLET ORAL DAILY
Status: DISCONTINUED | OUTPATIENT
Start: 2017-12-28 | End: 2017-12-29 | Stop reason: HOSPADM

## 2017-12-27 RX ORDER — AMOXICILLIN AND CLAVULANATE POTASSIUM 500; 125 MG/1; MG/1
1 TABLET, FILM COATED ORAL 2 TIMES DAILY
Status: DISCONTINUED | OUTPATIENT
Start: 2017-12-27 | End: 2017-12-29 | Stop reason: HOSPADM

## 2017-12-27 RX ORDER — CARVEDILOL 6.25 MG/1
6.25 TABLET ORAL 2 TIMES DAILY
Status: DISCONTINUED | OUTPATIENT
Start: 2017-12-27 | End: 2017-12-28

## 2017-12-27 RX ADMIN — APIXABAN 2.5 MG: 2.5 TABLET, FILM COATED ORAL at 09:12

## 2017-12-27 RX ADMIN — TAMSULOSIN HYDROCHLORIDE 0.4 MG: 0.4 CAPSULE ORAL at 09:12

## 2017-12-27 RX ADMIN — CARVEDILOL 6.25 MG: 6.25 TABLET, FILM COATED ORAL at 09:12

## 2017-12-27 RX ADMIN — TUBERCULIN PURIFIED PROTEIN DERIVATIVE 5 UNITS: 5 INJECTION INTRADERMAL at 02:12

## 2017-12-27 RX ADMIN — GABAPENTIN 300 MG: 300 CAPSULE ORAL at 09:12

## 2017-12-27 RX ADMIN — AMOXICILLIN AND CLAVULANATE POTASSIUM 500 MG: 500; 125 TABLET, FILM COATED ORAL at 09:12

## 2017-12-27 RX ADMIN — IPRATROPIUM BROMIDE AND ALBUTEROL SULFATE 3 ML: .5; 3 SOLUTION RESPIRATORY (INHALATION) at 07:12

## 2017-12-27 RX ADMIN — IPRATROPIUM BROMIDE AND ALBUTEROL SULFATE 3 ML: .5; 3 SOLUTION RESPIRATORY (INHALATION) at 03:12

## 2017-12-27 RX ADMIN — IPRATROPIUM BROMIDE AND ALBUTEROL SULFATE 3 ML: .5; 3 SOLUTION RESPIRATORY (INHALATION) at 12:12

## 2017-12-27 NOTE — ASSESSMENT & PLAN NOTE
Elevated troponin. Hyperlipidemia. Venous stasis of lower extremities.   Troponin 0.084 >> 1.313 >> 1.065. Consulting Cardiology to review history and make recommendations.  Denies CP and leg pain.  No home statin or antiplatelet.  Lipid panel 12/13/2017: , HDL 25, LDL 60, TG 85.  On apixaban for chronic atrial fibrillation.  Monitor on telemetry.

## 2017-12-27 NOTE — NURSING
SHERRIE Okeefe notified of patient having a blood pressure of 182/115 after administrating morning medications. No new orders placed at this time. Will continue to monitor patient.

## 2017-12-27 NOTE — HOSPITAL COURSE
He remained on room air.  WBC count was normal the next morning.  Renal function was improving.  Troponin increased and Cardiology felt it was just demand ischemia from being in respiratory distress.  Case management began working on skilled nursing facility placement.  Amoxicillin-clavulanate was given for his pneumonia.  He remained stable throughout the hospital course.  Creatinine decreased to 1.6 by 12/29/17.  He was discharged to [facility] on this date.

## 2017-12-27 NOTE — ASSESSMENT & PLAN NOTE
Rate controlled at present   Continue low dose BB for now and monitor tele for rate  07/2017 holter with HR 37-97 resulting in discontinuation of BB by EP but was later resumed  If significant bradycardia noted will DC BB therapy   Continue Eliquis

## 2017-12-27 NOTE — ASSESSMENT & PLAN NOTE
Prescribed furosemide 40 mg daily, carvedilol 20 mg daily, and losartan 100 mg daily. Holding furosemide and losartan for JOHN PAUL.

## 2017-12-27 NOTE — ED NOTES
Pt has not urinated yet this shift. Is attempting to urinate in urinal at this time. Admit team notified.

## 2017-12-27 NOTE — PROGRESS NOTES
The Sw faxed the pt's info to [facility] via [facility]. This pt will not be able to d/c to snf until 12-29-17 due to the 3 midnight in pt rule per Medicare.

## 2017-12-27 NOTE — HPI
Joseph Valerio is an 81 yo male with morbid obesity, history of Jg-en-Y gastric bypass on 3/28/07, HTN, DM2, HLD, PVD, BPH, AF on apixaban, AFLTs/p RFA, CARMELO , CKD.  Patient presented to the ED via EMS with hypoxia and lethargy.  O2 sat was 85% per EMS which improved with CPAP.  He has a productive cough with thick white sputum.  /90 in the ED with subsequent hypotension 93/42. Patient denies CAD history, reports LHC in the past but can not recall results. Denies any coronary stents, CABG, chest pain. No PND, orthopnea, dizziness, syncope, edema.  Admitted to Hospital medicine service for PNA. Troponin noted elevated 0.084, 1.3, 1.06. BNP elevated 365 ~baseline. No acute ischemic changes per ECG. Patient noted [place] with transient confusion.

## 2017-12-27 NOTE — ASSESSMENT & PLAN NOTE
Trop 0.084, 1.3, 1.06  No chest pain or acute ischemic changes on ECG   Echo without WMA and preserved LVEF   Elevation felt to be demand in setting of severe HTN, PNA with hypoxemia

## 2017-12-27 NOTE — PLAN OF CARE
Problem: Physical Therapy Goal  Goal: Physical Therapy Goal  Goals to be met by: 2018     Patient will increase functional independence with mobility by performin. Supine to sit with MInimal Assistance  2. Sit to stand transfer with Minimal Assistance  3. Gait  x 75 feet with Minimal Assistance using Rolling Walker.     Outcome: Ongoing (interventions implemented as appropriate)  Patient required max assist +2 with supine <> sit and max assist sit <> stand  Recommend SNf  DME TBD by facility

## 2017-12-27 NOTE — ASSESSMENT & PLAN NOTE
Pulmonary hypertension  Mild crackles in lung bases this morning but no evidence of hypoxia or dyspnea. Prescribed furosemide 40 mg daily, ?coreg 20 mg day (though not on list from Pharmacy - likely gets from mail order), and losartan 100 mg daily. Holding furosemide and losartan for JOHN PAUL. Restart BB. Monitor volume status. Monitor I&O and daily weights.

## 2017-12-27 NOTE — PLAN OF CARE
TN met with patient. He was notified therapy is recommending SNF on discharge. TN educated him regarding this. He is agreeable. TN also called patient's son. He was also in agreement.     SNF preferences:    1)[facility].    Son did not want to choose any other facilities, wanted to see if [facility] would accept. TN notified SW.      12/27/17 1320   Discharge Reassessment   Discharge Plan A Skilled Nursing Facility   Discharge Plan B Home with family;Home Health     Ninfa Fitzgerald RN  Transition Navigator  [phone]

## 2017-12-27 NOTE — PLAN OF CARE
Problem: Occupational Therapy Goal  Goal: Occupational Therapy Goal  Outcome: Ongoing (interventions implemented as appropriate)  Pt would benefit from cont OT services in order to maximize functional independence. Recommending SNF at d/c

## 2017-12-27 NOTE — NURSING
Patient found taking medications from home that were brought from family member. Patient instructed to not take medications from home and edcuated that all medications needed will be administered. Patient verbalizes clear understanding.

## 2017-12-27 NOTE — PROGRESS NOTES
Pharmacist Renal Dose Adjustment Note    Joseph Valerio is a 82 y.o. male being treated with the medication Amoxicillin-clavulanate     Patient Data:    Vital Signs (Most Recent):  Temp: 98 °F (36.7 °C) (12/27/17 0749)  Pulse: 89 (12/27/17 0749)  Resp: 18 (12/27/17 0734)  BP: (!) 182/115 (12/27/17 0749)  SpO2: 96 % (12/27/17 0734)   Vital Signs (72h Range):  Temp:  [97.5 °F (36.4 °C)-100.3 °F (37.9 °C)]   Pulse:  []   Resp:  [10-25]   BP: ()/()   SpO2:  [84 %-100 %]        Recent Labs     Lab 12/26/17  0526 12/27/17  0401   CREATININE 3.0* 2.8*     Serum creatinine: 2.8 mg/dL High 12/27/17 0401  Estimated creatinine clearance: 25.1 mL/min    Medication:amoxicillin-clavulanate dose: 875-125mg frequency BID will be changed to medication:AMOXICILLIN-CLAVULANATE dose:500-125MG frequency:BID    Pharmacist's Name: Aiyana Che  Pharmacist's Extension: [phone]

## 2017-12-27 NOTE — ASSESSMENT & PLAN NOTE
No documented history of CAD  Patient reports angiogram in the past at [facility]- records unavailable for review   Recommend addition of ASA to regimen; no statin given history of rhabdo, no ACEI/ARB in setting of JOHN PAUL  Continue low dose BB for now and monitor tele for rate  07/2017 holter with HR 37-97 resulting in discontinuation of BB by EP but was later resumed  If significant bradycardia noted will DC BB therapy

## 2017-12-27 NOTE — ASSESSMENT & PLAN NOTE
Presented hypertensive with SBP >200 now with marginal SBP (101) on last check  Hold antihypertensives for now, resume as BP and renal function allows

## 2017-12-27 NOTE — ASSESSMENT & PLAN NOTE
O2 sat > 92% on room air. Pt states does not use CPAP at home regularly. Did not tolerate BiPap earlier today so will hold for now.  Supplemental oxygen as needed to keep sats > 92%.

## 2017-12-27 NOTE — ASSESSMENT & PLAN NOTE
this morning.  Check blood glucose AC and HS and use SSI.  A1c 5.6% on 12/13/17. Pt states that he takes Lantus 25 units daily at home. Hold here for now to minimize risk of hypoglycemia.   Diabetic diet.

## 2017-12-27 NOTE — ASSESSMENT & PLAN NOTE
CKD 3-4  Appears baseline creatinine is 1.8-2.1. 2.8 today, down from 3.0 on admission.  Pt on furosemide and losartan at home but unsure if actually taking. Continue to hold here for JOHN PAUL.  Avoid nephrotoxins and renally dose meds. Monitor. Renal ultrasound if not improving.

## 2017-12-27 NOTE — SUBJECTIVE & OBJECTIVE
Past Medical History:   Diagnosis Date    Allergy     Aortic sclerosis     BPH (benign prostatic hypertrophy)     CAD (coronary artery disease) 7/31/2012    Calcium nephrolithiasis     Chronic allergic rhinitis 5/28/2013    Chronic anticoagulation - apixaban  11/7/2017    For AFib    Chronic atrial fibrillation 10/16/2014    CKD (chronic kidney disease), stage III 10/16/2014    Colon polyps     Coronary artery disease     CPAP (continuous positive airway pressure) dependence     Degenerative disc disease     Encephalopathy, toxic 6/19/2017    Erectile dysfunction 8/20/2012    Hiatal hernia     HLD (hyperlipidemia) 7/31/2012    Hyperprolactinemia 12/29/2014    Hypertension     Increased prolactin level 9/17/2012    39.9 (3-15 range)     Lumbar disc disease 7/31/2012    Male hypogonadism 9/12/2012    Obesity 7/31/2012    S/p Gastric Bypass Surgery     Peripheral vascular disease     Pituitary microadenoma 2/26/2013    Pneumonia, community acquired 6/18/2017    Proteinuria     Pulmonary hypertension 12/26/2017    Renal insufficiency     Renovascular hypertension 7/31/2012    Severe sepsis 6/19/2017    Sleep apnea     Traumatic rhabdomyolysis 11/7/2017    Type 2 diabetes mellitus with stage 3 chronic kidney disease, with long-term current use of insulin 7/31/2012    Venous insufficiency        Past Surgical History:   Procedure Laterality Date    CATARACT EXTRACTION, BILATERAL      CHOLECYSTECTOMY      CYSTOLITHOTOMY  09/23/2005    GASTRIC BYPASS  03/28/2007    Jg-en-Y    RADIOFREQUENCY ABLATION  10/18/2005    right subeustachian isthums ablation for atrial flutter       Review of patient's allergies indicates:  No Known Allergies    No current facility-administered medications on file prior to encounter.      Current Outpatient Prescriptions on File Prior to Encounter   Medication Sig    acetaminophen (TYLENOL) 500 MG tablet Take 2 tablets (1,000 mg total) by mouth every 8  (eight) hours as needed for Pain. Pain Scaled < 7    apixaban 2.5 mg Tab Take 1 tablet (2.5 mg total) by mouth 2 (two) times daily.    COREG CR 20 mg 24 hr capsule Take 20 mg by mouth once daily.     furosemide (LASIX) 40 MG tablet Take 1 tablet (40 mg total) by mouth once daily.    gabapentin (NEURONTIN) 300 MG capsule TAKE 1 CAPSULE BY MOUTH AT BEDTIME FOR DIABETIC NEUROPATHY    insulin glargine (LANTUS SOLOSTAR) 100 unit/mL (3 mL) InPn pen 25 units SQ in AM for Diabetes    lancets (ACCU-CHEK SOFTCLIX LANCETS) Misc 1 strip by Misc.(Non-Drug; Combo Route) route 2 (two) times daily. (Patient taking differently: 1 strip by Misc.(Non-Drug; Combo Route) route once daily. )    lansoprazole (PREVACID SOLUTAB) 30 MG disintegrating tablet Take 1 tablet (30 mg total) by mouth once daily. 1 Tablet,Rapid Dissolve, DR Sublingual Every day.  for acid reflux (Patient taking differently: Take 30 mg by mouth daily as needed (acid reflux). )    losartan (COZAAR) 100 MG tablet Take 1 tablet (100 mg total) by mouth once daily.    oxyCODONE (OXYCONTIN) 30 mg TR12 12 hr tablet Take 1 tablet (30 mg total) by mouth every 12 (twelve) hours.    tamsulosin (FLOMAX) 0.4 mg Cp24 Take 1 capsule (0.4 mg total) by mouth once daily. 1 Capsule, Sust. Release 24 hr Oral Every day.  for prostate (Patient taking differently: Take 0.4 mg by mouth once daily. )    walker Misc 1 Rolling Walker with a Seat    blood sugar diagnostic Strp Check glucose 2-3x/day before meals and/or bedtime (Patient taking differently: Check glucose 1-2 times daily)    calcium-vitamin D 600 mg(1,500mg) -400 unit Tab 1 tab daily    ferrous sulfate dried (SLOW FE) 160 mg (50 mg iron) TbSR Take 1 tablet (160 mg total) by mouth once daily.    multivitamin (THERAGRAN) per tablet Take 1 tablet by mouth once daily.    timolol maleate 0.5% (TIMOPTIC) 0.5 % Drop Place 1 drop into both eyes once daily.     TRAVATAN Z 0.004 % Drop Place 1 drop into both eyes every  evening.      Family History     None        Social History Main Topics    Smoking status: Former Smoker     Packs/day: 4.00     Years: 30.00     Quit date: 1/9/1990    Smokeless tobacco: Never Used      Comment: pt was up to 4 packs a day before.    Alcohol use No    Drug use: No    Sexual activity: Not on file     Review of Systems   Constitution: Negative for decreased appetite, diaphoresis, fever, weakness, malaise/fatigue, weight gain and weight loss.   HENT: Positive for congestion.    Eyes: Negative.    Cardiovascular: Positive for dyspnea on exertion and leg swelling. Negative for chest pain, near-syncope, orthopnea, palpitations, paroxysmal nocturnal dyspnea and syncope.   Respiratory: Positive for cough, shortness of breath and sputum production.    Endocrine: Negative.    Hematologic/Lymphatic: Negative.    Skin: Positive for color change.   Musculoskeletal: Negative.    Gastrointestinal: Negative.    Genitourinary: Negative.    Neurological: Negative.    Psychiatric/Behavioral: Positive for altered mental status and memory loss.   Allergic/Immunologic: Negative.      Objective:     Vital Signs (Most Recent):  Temp: 97.6 °F (36.4 °C) (12/27/17 1141)  Pulse: (!) 57 (12/27/17 1225)  Resp: 16 (12/27/17 1225)  BP: (!) 101/51 (12/27/17 1141)  SpO2: 97 % (12/27/17 1225) Vital Signs (24h Range):  Temp:  [97.5 °F (36.4 °C)-98.4 °F (36.9 °C)] 97.6 °F (36.4 °C)  Pulse:  [] 57  Resp:  [10-19] 16  SpO2:  [93 %-100 %] 97 %  BP: ()/() 101/51     Weight: 105.1 kg (231 lb 11.3 oz)  Body mass index is 33.25 kg/m².    SpO2: 97 %  O2 Device (Oxygen Therapy): room air      Intake/Output Summary (Last 24 hours) at 12/27/17 1514  Last data filed at 12/27/17 1000   Gross per 24 hour   Intake              240 ml   Output                0 ml   Net              240 ml       Lines/Drains/Airways     Peripheral Intravenous Line                 Peripheral IV - Single Lumen 12/26/17 0510 Right Hand 1 day          Peripheral IV - Single Lumen 12/26/17 1815 Left Antecubital less than 1 day                Physical Exam   Constitutional: He appears well-developed and well-nourished. No distress.   HENT:   Head: Normocephalic and atraumatic.   Eyes: Right eye exhibits no discharge. Left eye exhibits no discharge.   Neck: No JVD present.   Cardiovascular: Normal rate.  An irregularly irregular rhythm present. Exam reveals no gallop.    No murmur heard.  Pulmonary/Chest: No accessory muscle usage. No respiratory distress. He has decreased breath sounds. He has rhonchi. He has no rales.   Abdominal: Soft. Bowel sounds are normal.   Musculoskeletal: He exhibits edema.   Neurological: He is alert.   Skin: Skin is warm and dry. He is not diaphoretic. There is erythema.   Psychiatric: He has a normal mood and affect.       Significant Labs:   BMP:   Recent Labs  Lab 12/26/17 0526 12/27/17 0401   * 112*    144   K 4.1 4.3    109   CO2 23 27   BUN 38* 44*   CREATININE 3.0* 2.8*   CALCIUM 8.0* 8.3*   MG  --  1.9   , CMP   Recent Labs  Lab 12/26/17 0526 12/27/17  0401    144   K 4.1 4.3    109   CO2 23 27   * 112*   BUN 38* 44*   CREATININE 3.0* 2.8*   CALCIUM 8.0* 8.3*   PROT 6.3  --    ALBUMIN 2.8*  --    BILITOT 0.8  --    ALKPHOS 90  --    AST 34  --    ALT 29  --    ANIONGAP 10 8   ESTGFRAFRICA 22* 23*   EGFRNONAA 19* 20*   , CBC   Recent Labs  Lab 12/26/17 0526 12/27/17  0401   WBC 14.59* 10.61   HGB 10.0* 9.3*   HCT 33.6* 31.3*    243   , INR   Recent Labs  Lab 12/26/17 0526   INR 1.1   , Lipid Panel No results for input(s): CHOL, HDL, LDLCALC, TRIG, CHOLHDL in the last 48 hours., Troponin   Recent Labs  Lab 12/26/17  0526 12/26/17 1815 12/27/17  0009   TROPONINI 0.084* 1.313* 1.065*    and All pertinent lab results from the last 24 hours have been reviewed.    Significant Imaging: Echocardiogram:   2D echo with color flow doppler:   Results for orders placed or performed during the  hospital encounter of 12/26/17   2D echo with color flow doppler   Result Value Ref Range    EF 65 55 - 65    Est. PA Systolic Pressure 12.67     Pericardial Effusion NONE     Tricuspid Valve Regurgitation MILD

## 2017-12-27 NOTE — ASSESSMENT & PLAN NOTE
SBP .  Has coreg CR 20 mg 24 hour capsule once daily, losartan 100 mg daily on home list. Unsure if taking either. Start coreg 6.125 BID. Continue hold on losartan for JOHN PAUL.  Monitor.

## 2017-12-27 NOTE — SUBJECTIVE & OBJECTIVE
Interval History: Pt without complaints today. Denies CP, SOB. + coughing. Son, Joseph Valerio Jr., at bedside this morning.     Review of Systems   Constitutional: Negative for chills and fever.   Respiratory: Positive for cough. Negative for shortness of breath.    Cardiovascular: Negative for chest pain and palpitations.   Gastrointestinal: Negative for abdominal pain, nausea and vomiting.   Skin: Positive for wound.   Neurological: Negative for headaches.   Psychiatric/Behavioral: Negative for confusion.     Objective:     Vital Signs (Most Recent):  Temp: 98 °F (36.7 °C) (12/27/17 0749)  Pulse: 89 (12/27/17 0749)  Resp: 18 (12/27/17 0734)  BP: (!) 182/115 (12/27/17 0749)  SpO2: 96 % (12/27/17 0734) Vital Signs (24h Range):  Temp:  [97.5 °F (36.4 °C)-98.4 °F (36.9 °C)] 98 °F (36.7 °C)  Pulse:  [] 89  Resp:  [10-20] 18  SpO2:  [93 %-100 %] 96 %  BP: ()/() 182/115     Weight: 105.1 kg (231 lb 11.3 oz)  Body mass index is 33.25 kg/m².    Intake/Output Summary (Last 24 hours) at 12/27/17 1119  Last data filed at 12/27/17 1000   Gross per 24 hour   Intake              340 ml   Output                0 ml   Net              340 ml      Physical Exam   Constitutional: He is oriented to person, place, and time. No distress.   Cardiovascular:   Irregularly irregular rhythm    Pulmonary/Chest: Effort normal. No respiratory distress.   Crackles at lung bases   Abdominal: Soft. Bowel sounds are normal. He exhibits no distension.   Neurological: He is alert and oriented to person, place, and time.   Skin:   Healing wounds on bilateral forearms with surrounding ecchymoses.    Psychiatric: He has a normal mood and affect. Thought content normal.   Nursing note and vitals reviewed.    Significant Labs:   BMP:   Recent Labs  Lab 12/27/17  0401   *      K 4.3      CO2 27   BUN 44*   CREATININE 2.8*   CALCIUM 8.3*   MG 1.9     CBC:   Recent Labs  Lab 12/26/17  0526 12/27/17  0401   WBC 14.59*  10.61   HGB 10.0* 9.3*   HCT 33.6* 31.3*    243     POCT Glucose:   Recent Labs  Lab 17  0501 17  0453   POCTGLUCOSE 182* 197* 120*       Significant ImaginD Echo done, report pending

## 2017-12-27 NOTE — CONSULTS
[facility]  Cardiology  Consult Note    Patient Name: Joseph Valerio  MRN: [id number]  Admission Date: 12/26/2017  Hospital Length of Stay: 1 days  Code Status: Full Code   Attending Provider: Anton Wolff MD   Consulting Provider: Justin Burns NP  Primary Care Physician: Thompson Stiles MD  Principal Problem:Pneumonia of right lower lobe due to infectious organism    Patient information was obtained from patient, past medical records and ER records.     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  Patient presented to the ED via EMS with hypoxia and lethargy.  O2 sat was 85% per EMS which improved with CPAP.  He has a productive cough with thick white sputum.  /90 in the ED with subsequent hypotension 93/42. Patient denies CAD history, reports LHC in the past but can not recall results. Denies any coronary stents, CABG, chest pain. No PND, orthopnea, dizziness, syncope, edema.  Admitted to Hospital medicine service for PNA. Troponin noted elevated 0.084, 1.3, 1.06. BNP elevated 365 ~baseline. No acute ischemic changes per ECG. Patient noted [place] with transient confusion.     Past Medical History:   Diagnosis Date    Allergy     Aortic sclerosis     BPH (benign prostatic hypertrophy)     CAD (coronary artery disease) 7/31/2012    Calcium nephrolithiasis     Chronic allergic rhinitis 5/28/2013    Chronic anticoagulation - apixaban  11/7/2017    For AFib    Chronic atrial fibrillation 10/16/2014    CKD (chronic kidney disease), stage III 10/16/2014    Colon polyps     Coronary artery disease     CPAP (continuous positive airway pressure) dependence     Degenerative disc  disease     Encephalopathy, toxic 6/19/2017    Erectile dysfunction 8/20/2012    Hiatal hernia     HLD (hyperlipidemia) 7/31/2012    Hyperprolactinemia 12/29/2014    Hypertension     Increased prolactin level 9/17/2012    39.9 (3-15 range)     Lumbar disc disease 7/31/2012    Male hypogonadism 9/12/2012    Obesity 7/31/2012    S/p Gastric Bypass Surgery     Peripheral vascular disease     Pituitary microadenoma 2/26/2013    Pneumonia, community acquired 6/18/2017    Proteinuria     Pulmonary hypertension 12/26/2017    Renal insufficiency     Renovascular hypertension 7/31/2012    Severe sepsis 6/19/2017    Sleep apnea     Traumatic rhabdomyolysis 11/7/2017    Type 2 diabetes mellitus with stage 3 chronic kidney disease, with long-term current use of insulin 7/31/2012    Venous insufficiency        Past Surgical History:   Procedure Laterality Date    CATARACT EXTRACTION, BILATERAL      CHOLECYSTECTOMY      CYSTOLITHOTOMY  09/23/2005    GASTRIC BYPASS  03/28/2007    Jg-en-Y    RADIOFREQUENCY ABLATION  10/18/2005    right subeustachian isthums ablation for atrial flutter       Review of patient's allergies indicates:  No Known Allergies    No current facility-administered medications on file prior to encounter.      Current Outpatient Prescriptions on File Prior to Encounter   Medication Sig    acetaminophen (TYLENOL) 500 MG tablet Take 2 tablets (1,000 mg total) by mouth every 8 (eight) hours as needed for Pain. Pain Scaled < 7    apixaban 2.5 mg Tab Take 1 tablet (2.5 mg total) by mouth 2 (two) times daily.    COREG CR 20 mg 24 hr capsule Take 20 mg by mouth once daily.     furosemide (LASIX) 40 MG tablet Take 1 tablet (40 mg total) by mouth once daily.    gabapentin (NEURONTIN) 300 MG capsule TAKE 1 CAPSULE BY MOUTH AT BEDTIME FOR DIABETIC NEUROPATHY    insulin glargine (LANTUS SOLOSTAR) 100 unit/mL (3 mL) InPn pen 25 units SQ in AM for Diabetes    lancets (ACCU-CHEK SOFTCLIX  LANCETS) Misc 1 strip by Misc.(Non-Drug; Combo Route) route 2 (two) times daily. (Patient taking differently: 1 strip by Misc.(Non-Drug; Combo Route) route once daily. )    lansoprazole (PREVACID SOLUTAB) 30 MG disintegrating tablet Take 1 tablet (30 mg total) by mouth once daily. 1 Tablet,Rapid Dissolve, DR Sublingual Every day.  for acid reflux (Patient taking differently: Take 30 mg by mouth daily as needed (acid reflux). )    losartan (COZAAR) 100 MG tablet Take 1 tablet (100 mg total) by mouth once daily.    oxyCODONE (OXYCONTIN) 30 mg TR12 12 hr tablet Take 1 tablet (30 mg total) by mouth every 12 (twelve) hours.    tamsulosin (FLOMAX) 0.4 mg Cp24 Take 1 capsule (0.4 mg total) by mouth once daily. 1 Capsule, Sust. Release 24 hr Oral Every day.  for prostate (Patient taking differently: Take 0.4 mg by mouth once daily. )    walker Misc 1 Rolling Walker with a Seat    blood sugar diagnostic Strp Check glucose 2-3x/day before meals and/or bedtime (Patient taking differently: Check glucose 1-2 times daily)    calcium-vitamin D 600 mg(1,500mg) -400 unit Tab 1 tab daily    ferrous sulfate dried (SLOW FE) 160 mg (50 mg iron) TbSR Take 1 tablet (160 mg total) by mouth once daily.    multivitamin (THERAGRAN) per tablet Take 1 tablet by mouth once daily.    timolol maleate 0.5% (TIMOPTIC) 0.5 % Drop Place 1 drop into both eyes once daily.     TRAVATAN Z 0.004 % Drop Place 1 drop into both eyes every evening.      Family History     None        Social History Main Topics    Smoking status: Former Smoker     Packs/day: 4.00     Years: 30.00     Quit date: 1/9/1990    Smokeless tobacco: Never Used      Comment: pt was up to 4 packs a day before.    Alcohol use No    Drug use: No    Sexual activity: Not on file     Review of Systems   Constitution: Negative for decreased appetite, diaphoresis, fever, weakness, malaise/fatigue, weight gain and weight loss.   HENT: Positive for congestion.    Eyes: Negative.     Cardiovascular: Positive for dyspnea on exertion and leg swelling. Negative for chest pain, near-syncope, orthopnea, palpitations, paroxysmal nocturnal dyspnea and syncope.   Respiratory: Positive for cough, shortness of breath and sputum production.    Endocrine: Negative.    Hematologic/Lymphatic: Negative.    Skin: Positive for color change.   Musculoskeletal: Negative.    Gastrointestinal: Negative.    Genitourinary: Negative.    Neurological: Negative.    Psychiatric/Behavioral: Positive for altered mental status and memory loss.   Allergic/Immunologic: Negative.      Objective:     Vital Signs (Most Recent):  Temp: 97.6 °F (36.4 °C) (12/27/17 1141)  Pulse: (!) 57 (12/27/17 1225)  Resp: 16 (12/27/17 1225)  BP: (!) 101/51 (12/27/17 1141)  SpO2: 97 % (12/27/17 1225) Vital Signs (24h Range):  Temp:  [97.5 °F (36.4 °C)-98.4 °F (36.9 °C)] 97.6 °F (36.4 °C)  Pulse:  [] 57  Resp:  [10-19] 16  SpO2:  [93 %-100 %] 97 %  BP: ()/() 101/51     Weight: 105.1 kg (231 lb 11.3 oz)  Body mass index is 33.25 kg/m².    SpO2: 97 %  O2 Device (Oxygen Therapy): room air      Intake/Output Summary (Last 24 hours) at 12/27/17 1514  Last data filed at 12/27/17 1000   Gross per 24 hour   Intake              240 ml   Output                0 ml   Net              240 ml       Lines/Drains/Airways     Peripheral Intravenous Line                 Peripheral IV - Single Lumen 12/26/17 0510 Right Hand 1 day         Peripheral IV - Single Lumen 12/26/17 1815 Left Antecubital less than 1 day                Physical Exam   Constitutional: He appears well-developed and well-nourished. No distress.   HENT:   Head: Normocephalic and atraumatic.   Eyes: Right eye exhibits no discharge. Left eye exhibits no discharge.   Neck: No JVD present.   Cardiovascular: Normal rate.  An irregularly irregular rhythm present. Exam reveals no gallop.    No murmur heard.  Pulmonary/Chest: No accessory muscle usage. No respiratory distress. He has  decreased breath sounds. He has rhonchi. He has no rales.   Abdominal: Soft. Bowel sounds are normal.   Musculoskeletal: He exhibits edema.   Neurological: He is alert.   Skin: Skin is warm and dry. He is not diaphoretic. There is erythema.   Psychiatric: He has a normal mood and affect.       Significant Labs:   BMP:   Recent Labs  Lab 12/26/17  0526 12/27/17  0401   * 112*    144   K 4.1 4.3    109   CO2 23 27   BUN 38* 44*   CREATININE 3.0* 2.8*   CALCIUM 8.0* 8.3*   MG  --  1.9   , CMP   Recent Labs  Lab 12/26/17  0526 12/27/17  0401    144   K 4.1 4.3    109   CO2 23 27   * 112*   BUN 38* 44*   CREATININE 3.0* 2.8*   CALCIUM 8.0* 8.3*   PROT 6.3  --    ALBUMIN 2.8*  --    BILITOT 0.8  --    ALKPHOS 90  --    AST 34  --    ALT 29  --    ANIONGAP 10 8   ESTGFRAFRICA 22* 23*   EGFRNONAA 19* 20*   , CBC   Recent Labs  Lab 12/26/17  0526 12/27/17  0401   WBC 14.59* 10.61   HGB 10.0* 9.3*   HCT 33.6* 31.3*    243   , INR   Recent Labs  Lab 12/26/17  0526   INR 1.1   , Lipid Panel No results for input(s): CHOL, HDL, LDLCALC, TRIG, CHOLHDL in the last 48 hours., Troponin   Recent Labs  Lab 12/26/17  0526 12/26/17  1815 12/27/17  0009   TROPONINI 0.084* 1.313* 1.065*    and All pertinent lab results from the last 24 hours have been reviewed.    Significant Imaging: Echocardiogram:   2D echo with color flow doppler:   Results for orders placed or performed during the hospital encounter of 12/26/17   2D echo with color flow doppler   Result Value Ref Range    EF 65 55 - 65    Est. PA Systolic Pressure 12.67     Pericardial Effusion NONE     Tricuspid Valve Regurgitation MILD      Assessment and Plan:     Elevated troponin    Trop 0.084, 1.3, 1.06  No chest pain or acute ischemic changes on ECG   Echo without WMA and preserved LVEF   Elevation felt to be demand in setting of severe HTN, PNA with hypoxemia        Persistent atrial fibrillation    Rate controlled at present    Continue low dose BB for now and monitor tele for rate  07/2017 holter with HR 37-97 resulting in discontinuation of BB by EP but was later resumed  If significant bradycardia noted will DC BB therapy   Continue Eliquis         Coronary artery disease involving native coronary artery of native heart without angina pectoris    No documented history of CAD  Patient reports angiogram in the past at [facility]- records unavailable for review   Recommend addition of ASA to regimen; no statin given history of rhabdo, no ACEI/ARB in setting of JOHN PAUL  Continue low dose BB for now and monitor tele for rate  07/2017 holter with HR 37-97 resulting in discontinuation of BB by EP but was later resumed  If significant bradycardia noted will DC BB therapy         Essential hypertension    Presented hypertensive with SBP >200 now with marginal SBP (101) on last check  Hold antihypertensives for now, resume as BP and renal function allows            VTE Risk Mitigation         Ordered     apixaban tablet 2.5 mg  2 times daily     Route:  Oral        12/26/17 1956     Medium Risk of VTE  Once      12/26/17 1551     Reason for No Pharmacological VTE Prophylaxis  Once      12/26/17 1551          Thank you for your consult. I will follow-up with patient. Please contact us if you have any additional questions.    Justin Lugo, SHERRIE  Cardiology   [facility]

## 2017-12-27 NOTE — PROGRESS NOTES
.Pharmacy New Medication Education    Patient accepted medication education.    Pharmacy educated patient on name and purpose of medications and possible side effects, using the teach-back method.     D/C Current Inpatient Medication Orders   D/C acetaminophen tablet 650 mg   D/C albuterol-ipratropium 2.5mg-0.5mg/3mL nebulizer solution 3 mL   D/C amoxicillin-clavulanate 500-125mg per tablet 500 mg   D/C apixaban tablet 2.5 mg   D/C carvedilol tablet 6.25 mg   D/C gabapentin capsule 300 mg   D/C hydrocodone-acetaminophen 5-325mg per tablet 1 tablet   D/C ondansetron disintegrating tablet 8 mg   D/C ondansetron injection 4 mg   D/C oxyCODONE immediate release tablet 10 mg   D/C ramelteon tablet 8 mg   D/C sodium chloride 0.9% flush 5 mL   D/C tamsulosin 24 hr capsule 0.4 mg       Learners of pharmacy medication education included:  Patient    Patient +/- learner response:  Verbalized Understanding, Teachback

## 2017-12-27 NOTE — PT/OT/SLP EVAL
Occupational Therapy   Evaluation    Name: Joseph Valerio  MRN: [id number]  Admitting Diagnosis:  Pneumonia of right lower lobe due to infectious organism      Recommendations:     Discharge Recommendations: nursing facility, skilled  Discharge Equipment Recommendations:   (TBD)  Barriers to discharge:  Decreased caregiver support, Inaccessible home environment    History:     Occupational Profile: Pt questionable historian   Living Environment: Pt lives alone, SSH, WIS  Previous level of function: pt reports independence as PLOF   Equipment Owned:  walker, rolling  Assistance upon Discharge: limited    Past Medical History:   Diagnosis Date    Allergy     Aortic sclerosis     BPH (benign prostatic hypertrophy)     CAD (coronary artery disease) 7/31/2012    Calcium nephrolithiasis     Chronic allergic rhinitis 5/28/2013    Chronic anticoagulation - apixaban  11/7/2017    For AFib    Chronic atrial fibrillation 10/16/2014    CKD (chronic kidney disease), stage III 10/16/2014    Colon polyps     Coronary artery disease     CPAP (continuous positive airway pressure) dependence     Degenerative disc disease     Encephalopathy, toxic 6/19/2017    Erectile dysfunction 8/20/2012    Hiatal hernia     HLD (hyperlipidemia) 7/31/2012    Hyperprolactinemia 12/29/2014    Hypertension     Increased prolactin level 9/17/2012    39.9 (3-15 range)     Lumbar disc disease 7/31/2012    Male hypogonadism 9/12/2012    Obesity 7/31/2012    S/p Gastric Bypass Surgery     Peripheral vascular disease     Pituitary microadenoma 2/26/2013    Pneumonia, community acquired 6/18/2017    Proteinuria     Pulmonary hypertension 12/26/2017    Renal insufficiency     Renovascular hypertension 7/31/2012    Severe sepsis 6/19/2017    Sleep apnea     Traumatic rhabdomyolysis 11/7/2017    Type 2 diabetes mellitus with stage 3 chronic kidney disease, with long-term current use of insulin 7/31/2012    Venous insufficiency         Past Surgical History:   Procedure Laterality Date    CATARACT EXTRACTION, BILATERAL      CHOLECYSTECTOMY      CYSTOLITHOTOMY  09/23/2005    GASTRIC BYPASS  03/28/2007    Jg-[id number]    RADIOFREQUENCY ABLATION  10/18/2005    right subeustachian isthums ablation for atrial flutter       Subjective     Chief Complaint: pain  Patient/Family stated goals: none stated   Communicated with: nsg prior to session.  Pain/Comfort:  · Pain Rating 1: 5/10  · Location - Side 1: Bilateral  · Location 1: leg  · Pain Addressed 1: Reposition, Distraction, Cessation of Activity    Objective:     Patient found with:      General Precautions: Standard, fall   Orthopedic Precautions:N/A   Braces: N/A     Occupational Performance:    Bed Mobility:    · Patient completed Scooting/Bridging with maximal assistance; supine and seated scooting   · Patient completed Supine to Sit with maximal assistance  · Patient completed Sit to Supine with maximal assistance    Functional Mobility/Transfers:  · Patient completed Sit <> Stand Transfer with maximal assistance  with  rolling walker    · 2 trials during session; first trial with HHA of 2 and max a; Max A 2nd trial with RW- pt pulling on RW with B hands to pull self to stand; posterior leaning with support BLEs on bed     Activities of Daily Living:  · LB Dressing: total assistance socks   · Toileting: maximal assistance clothing mangement and attempt to use urinal     Cognitive/Visual Perceptual:  Cognitive/Psychosocial Skills:     -       Oriented to: Person   -       Follows Commands/attention:Easily distracted and Follows one-step commands  -       Communication: clear/fluent  -       Memory: Impaired STM, Impaired LTM and Poor immediate recall  -       Safety awareness/insight to disability: impaired   -       Mood/Affect/Coping skills/emotional control: Labile    Physical Exam:  Balance:    -       impaired dynamic sitting EOB Mn A /CGA; static standing Min/mod A; dynamic standing  "mod A with ambulation   Postural examination/scapula alignment: -       Rounded shoulders  -       Forward head  Skin integrity: Wound R shoulder open blister/wound; 2 tears gluteal cleft   Edema:  Moderate BLEs   Sensation:  Impaired BLEs   Dominant hand:    -       Right  Upper Extremity Range of Motion:  AROM WFL based on function   Upper Extremity Strength:  Impaired BUE based on function    Strength:  Good     Patient left supine with all lines intact, call button in reach, bed alarm on and nsg notified    [facility] 6 Click:  [facility] Total Score: 14    Treatment & Education:  Pt performing skills as listed above; increased time for all axs; Functional mobility in room for pre ADL tasks mod A for balance and RW management ~ 20 feet during session  Education:    Assessment:     Joseph Valerio is a 82 y.o. male with a medical diagnosis of Pneumonia of right lower lobe due to infectious organism.  He presents with deconditioning.  Performance deficits affecting function are weakness, gait instability, decreased upper extremity function, impaired balance, impaired endurance, decreased lower extremity function, decreased ROM, impaired self care skills, impaired functional mobilty, impaired cognition, pain, impaired skin, edema, decreased safety awareness.      Rehab Prognosis:  Good ; patient would benefit from acute skilled OT services to address these deficits and reach maximum level of function.         Clinical Decision Makin.  OT Low:  "Pt evaluation falls under low complexity for evaluation coding due to performance deficits noted in 1-3 areas as stated above and 0 co-morbities affecting current functional status. Data obtained from problem focused assessments. No modifications or assistance was required for completion of evaluation. Only brief occupational profile and history review completed."     Plan:     Patient to be seen 5 x/week to address the above listed problems via self-care/home " management, therapeutic activities, therapeutic exercises  · Plan of Care Expires: 01/27/18  · Plan of Care Reviewed with: patient    This Plan of care has been discussed with the patient who was involved in its development and understands and is in agreement with the identified goals and treatment plan    GOALS:    Occupational Therapy Goals        Problem: Occupational Therapy Goal    Goal Priority Disciplines Outcome Interventions   Occupational Therapy Goal     OT, PT/OT Ongoing (interventions implemented as appropriate)    Description:  Goals to be met by: 1/27/18     Patient will increase functional independence with ADLs by performing:    LE Dressing with Minimal Assistance.  Grooming while standing with Contact Guard Assistance.  Toileting from toilet with Minimal Assistance for hygiene and clothing management.   Supine to sit with CG Assistance.  Stand pivot transfers with Contact Guard Assistance.  Toilet transfer to toilet with Contact Guard Assistance.  Increased functional strength to WFL for self care skills and functional mobility.  Upper extremity exercise program x10 reps per handout, with assist as needed .                      Time Tracking:     OT Date of Treatment: 12/27/17  OT Start Time: 1029  OT Stop Time: 1054  OT Total Time (min): 25 min    Billable Minutes:Evaluation 10  Therapeutic Activity 15    Majo Duval OT  12/27/2017

## 2017-12-27 NOTE — PLAN OF CARE
TN met with patient, son at bedside.  Patient lives at home alone, and he reports being independent. He has a rolling walker and CPAP at home. Patient has home health, but he is unsure of the name. TN awaiting therapy recommendations for discharge. TN will continue to follow.    Future Appointments  Date Time Provider Department Center   1/23/2018 2:30 PM Awilda Ho MD [facility] PULMSVC Rigoberto y   2/16/2018 4:40 PM Brandon Johansen MD [facility] NEPHRO Rigoberto [facility]   3/22/2018 9:30 AM Thompson Stiles MD [facility] MADGALENO Alejandro        12/27/17 5375   Discharge Assessment   Assessment Type Discharge Planning Assessment   Assessment information obtained from? Patient   Prior to hospitilization cognitive status: Alert/Oriented   Prior to hospitalization functional status: Assistive Equipment   Current cognitive status: Alert/Oriented   Current Functional Status: Assistive Equipment   Facility Arrived From: Home with Home Health   Lives With alone   Able to Return to Prior Arrangements unable to determine at this time (comments)   Patient's perception of discharge disposition home health;skilled nursing facility   Readmission Within The Last 30 Days no previous admission in last 30 days   Equipment Currently Used at Home CPAP;walker, rolling   Do you have any problems affording any of your prescribed medications? No   Is the patient taking medications as prescribed? yes   Does the patient have transportation home? Yes   Transportation Available family or friend will provide   Dialysis Name and Scheduled days N/A   Discharge Plan A Skilled Nursing Facility   Discharge Plan B Home with family;Home Health   Patient/Family In Agreement With Plan yes     Ninfa Fitzgerald RN  Transition Navigator  [phone]

## 2017-12-27 NOTE — ASSESSMENT & PLAN NOTE
Acute respiratory failure with hypoxia - resolved    Afebrile. WBC trended down to normal. Suspect some chronic infiltrate on imaging. De-escalate abx to augmentin, day 2 of 7 day course of abx for pneumonia. Has f/u with Pulmonology next month. Given vanc and cefepime in ED.  Q4 hour nebs, PRN supplemental oxygen.

## 2017-12-27 NOTE — PROGRESS NOTES
"[facility] Hospital Medicine  Progress Note    Patient Name: Joseph Valerio  MRN: [id number]  Patient Class: IP- Inpatient   Admission Date: 12/26/2017  Length of Stay: 1 days  Attending Physician: Anton Wolff MD  Primary Care Provider: Thompson Stiles MD        Subjective:     Principal Problem:Pneumonia of right lower lobe due to infectious organism    HPI:  81 yo male with morbid obesity, history of Jg-en-Y gastric bypass on 3/28/07, HTN, DM2, HLD, CAD, degenerative disc disease (lumbar spine), PVD, BPH, atrial fibrillation on apixaban, CARMELO on nightly CPAP, CKD, biatrial enlargement, history of right subeustachian isthums ablation for atrial flutter on 10/18/05.  VILMA beebe lives alone in [place], [place], and his son, Joseph Valerio Jr ([phone]) checks on him frequently.  He is retired and used to work as a  and an  in [employer].  He was exposed to asbestos.  He smoked 3.5 packs per day around 30 years and quit in 1990.   He denies use of alcohol or illicit drugs.  His primary care physician is Dr. Thompson Prince. His last clinic visit was on 12/20/2017 and Dr. Prince ordered a CT chest scan: "Mild persistent peribronchial tree in bud nodularity, most pronounced in the right upper and lower lobes. Findings could reflect atypical infectious etiology, inflammation, or sequela of aspiration."  He's had previous short hospitalizations for pneumonia and saw Pulmonology, Dr. Ho, on 7/28/2017. He ordered repeat CT scan in September 2017 (not done) and PFTs at next visit (1/23/18).                 He was hospitalized at [facility] from 11/6/17-11/8/17 for rhabdomyolysis.  He was discharged home with home health.                He was taken to [facility] Emergency Department the morning of 12/26/17 with hypoxia and lethargy.  His oxygen saturation was 85% per EMS, who placed him on CPAP, which improved his " hypoxia.  His son reported that he was disoriented for the past 2 days and did not recognize him the night before.  He had a productive cough with thick white sputum.  In the ED, he was hypertensive with blood pressure of 204/90.  He was placed on BiPAP.  WBC count was 14,590.  Troponin was 0.084 and BNP was 365, but his BNP has been similarly high in the past.  Chest x-ray showed a right costophrenic angle and right lung base opacity.  He was given cefepime and vancomycin.  He was weaned to room air and blood pressures decreased before admission to [facility].      His son reports that the patient's mental status has been waxing and waning for > 2 months. He states he believes he needs placement in a facility at least temporarily as he is unable to take full-time care of him at this time.     Hospital Course:  Remains on room air. WBC trended down to normal.  Renal function improving. Troponin increased in patient with known coronary disease. Consulted Cardiology.     Interval History: Pt without complaints today. Denies CP, SOB. + coughing. Son, Joseph Valerio ., at bedside this morning.     Review of Systems   Constitutional: Negative for chills and fever.   Respiratory: Positive for cough. Negative for shortness of breath.    Cardiovascular: Negative for chest pain and palpitations.   Gastrointestinal: Negative for abdominal pain, nausea and vomiting.   Skin: Positive for wound.   Neurological: Negative for headaches.   Psychiatric/Behavioral: Negative for confusion.     Objective:     Vital Signs (Most Recent):  Temp: 98 °F (36.7 °C) (12/27/17 0749)  Pulse: 89 (12/27/17 0749)  Resp: 18 (12/27/17 0734)  BP: (!) 182/115 (12/27/17 0749)  SpO2: 96 % (12/27/17 0734) Vital Signs (24h Range):  Temp:  [97.5 °F (36.4 °C)-98.4 °F (36.9 °C)] 98 °F (36.7 °C)  Pulse:  [] 89  Resp:  [10-20] 18  SpO2:  [93 %-100 %] 96 %  BP: ()/() 182/115     Weight: 105.1 kg (231 lb 11.3 oz)  Body mass  index is 33.25 kg/m².    Intake/Output Summary (Last 24 hours) at 17 1119  Last data filed at 17 1000   Gross per 24 hour   Intake              340 ml   Output                0 ml   Net              340 ml      Physical Exam   Constitutional: He is oriented to person, place, and time. No distress.   Cardiovascular:   Irregularly irregular rhythm    Pulmonary/Chest: Effort normal. No respiratory distress.   Crackles at lung bases   Abdominal: Soft. Bowel sounds are normal. He exhibits no distension.   Neurological: He is alert and oriented to person, place, and time.   Skin:   Healing wounds on bilateral forearms with surrounding ecchymoses.    Psychiatric: He has a normal mood and affect. Thought content normal.   Nursing note and vitals reviewed.    Significant Labs:   BMP:   Recent Labs  Lab 17  0401   *      K 4.3      CO2 27   BUN 44*   CREATININE 2.8*   CALCIUM 8.3*   MG 1.9     CBC:   Recent Labs  Lab 17  0526 17  0401   WBC 14.59* 10.61   HGB 10.0* 9.3*   HCT 33.6* 31.3*    243     POCT Glucose:   Recent Labs  Lab 17  0501 17  0453   POCTGLUCOSE 182* 197* 120*       Significant ImaginD Echo done, report pending    Assessment/Plan:      * Pneumonia of right lower lobe due to infectious organism    Acute respiratory failure with hypoxia - resolved    Afebrile. WBC trended down to normal. Suspect some chronic infiltrate on imaging. De-escalate abx to augmentin, day 2 of 7 day course of abx for pneumonia. Has f/u with Pulmonology next month. Given vanc and cefepime in ED.  Q4 hour nebs, PRN supplemental oxygen.           JOHN PAUL (acute kidney injury)    CKD 3-4  Appears baseline creatinine is 1.8-2.1. 2.8 today, down from 3.0 on admission.  Pt on furosemide and losartan at home but unsure if actually taking. Continue to hold here for JOHN PAUL.  Avoid nephrotoxins and renally dose meds. Monitor. Renal ultrasound if not improving.             Biatrial enlargement    Pulmonary hypertension  Mild crackles in lung bases this morning but no evidence of hypoxia or dyspnea. Prescribed furosemide 40 mg daily, ?coreg 20 mg day (though not on list from Pharmacy - likely gets from mail order), and losartan 100 mg daily. Holding furosemide and losartan for JOHN PAUL. Restart BB. Monitor volume status. Monitor I&O and daily weights.             Coronary artery disease involving native coronary artery of native heart without angina pectoris    Elevated troponin. Hyperlipidemia. Venous stasis of lower extremities.   Troponin 0.084 >> 1.313 >> 1.065. Consulting Cardiology to review history and make recommendations.  Denies CP and leg pain.  No home statin or antiplatelet.  Lipid panel 12/13/2017: , HDL 25, LDL 60, TG 85.  On apixaban for chronic atrial fibrillation.  Monitor on telemetry.           Persistent atrial fibrillation    Irregular rhythm on exam. HR . Continue home apixaban 2.5 mg BID which was started on 7/10/17 by Cardiology at [facility]. Pt has Coreg CR 20 mg 24 hour capsule on home medication list.  Starting carvedilol 6.25 mg BID.  Monitor on telemetry.           Essential hypertension    SBP .  Has coreg CR 20 mg 24 hour capsule once daily, losartan 100 mg daily on home list. Unsure if taking either. Start coreg 6.125 BID. Continue hold on losartan for JOHN PAUL.  Monitor.           Obstructive sleep apnea on CPAP    O2 sat > 92% on room air. Pt states does not use CPAP at home regularly. Did not tolerate BiPap earlier today so will hold for now.  Supplemental oxygen as needed to keep sats > 92%.           Type 2 diabetes mellitus with stage 3 chronic kidney disease, with long-term current use of insulin     this morning.  Check blood glucose AC and HS and use SSI.  A1c 5.6% on 12/13/17. Pt states that he takes Lantus 25 units daily at home. Hold here for now to minimize risk of hypoglycemia.   Diabetic diet.           Benign  prostatic hyperplasia    Continue home tamsulosin 0.4 mg daily. No  complaints.             VTE Risk Mitigation         Ordered     apixaban tablet 2.5 mg  2 times daily     Route:  Oral        12/26/17 1956     Medium Risk of VTE  Once      12/26/17 1551     Reason for No Pharmacological VTE Prophylaxis  Once      12/26/17 1551              Chacha Diaz PA-C  Department of Hospital Medicine   [facility]  Pager: [phone]

## 2017-12-27 NOTE — NURSING
Pt admitted to room 425 via stretcher.  Pt oriented to room and routine.  He is slow to respond to questions, but is oriented x3.  Telemetry applied.  Pt has no complaints at this time.

## 2017-12-27 NOTE — PLAN OF CARE
Problem: Patient Care Overview  Goal: Plan of Care Review  Outcome: Ongoing (interventions implemented as appropriate)  The pt has slept well tonight.  He is on telemetry running normal sinus rhythm without ectopy. VSS.  Pt has had an occasional cough which is productive.  Sputum sent to the lab.  Pt has multiple bruises, skin tears and reddened areas on his arms and legs.  His right elbows has 3+ edema with weeping. Pt has had no complaints during the shift.

## 2017-12-28 LAB
ANION GAP SERPL CALC-SCNC: 4 MMOL/L
BASOPHILS # BLD AUTO: 0.01 K/UL
BASOPHILS NFR BLD: 0.2 %
BUN SERPL-MCNC: 44 MG/DL
CALCIUM SERPL-MCNC: 8.1 MG/DL
CHLORIDE SERPL-SCNC: 108 MMOL/L
CO2 SERPL-SCNC: 24 MMOL/L
CREAT SERPL-MCNC: 2.3 MG/DL
DIFFERENTIAL METHOD: ABNORMAL
EOSINOPHIL # BLD AUTO: 0.1 K/UL
EOSINOPHIL NFR BLD: 1.9 %
ERYTHROCYTE [DISTWIDTH] IN BLOOD BY AUTOMATED COUNT: 13.6 %
EST. GFR  (AFRICAN AMERICAN): 30 ML/MIN/1.73 M^2
EST. GFR  (NON AFRICAN AMERICAN): 26 ML/MIN/1.73 M^2
GLUCOSE SERPL-MCNC: 127 MG/DL
HCT VFR BLD AUTO: 27.9 %
HGB BLD-MCNC: 8.5 G/DL
LYMPHOCYTES # BLD AUTO: 1.3 K/UL
LYMPHOCYTES NFR BLD: 20.1 %
MAGNESIUM SERPL-MCNC: 1.9 MG/DL
MCH RBC QN AUTO: 27.2 PG
MCHC RBC AUTO-ENTMCNC: 30.5 G/DL
MCV RBC AUTO: 89 FL
MONOCYTES # BLD AUTO: 0.7 K/UL
MONOCYTES NFR BLD: 11.1 %
NEUTROPHILS # BLD AUTO: 4.2 K/UL
NEUTROPHILS NFR BLD: 66.4 %
PHOSPHATE SERPL-MCNC: 3.2 MG/DL
PLATELET # BLD AUTO: 193 K/UL
PMV BLD AUTO: 11.2 FL
POCT GLUCOSE: 134 MG/DL (ref 70–110)
POCT GLUCOSE: 184 MG/DL (ref 70–110)
POCT GLUCOSE: 193 MG/DL (ref 70–110)
POCT GLUCOSE: 196 MG/DL (ref 70–110)
POTASSIUM SERPL-SCNC: 3.8 MMOL/L
RBC # BLD AUTO: 3.13 M/UL
SODIUM SERPL-SCNC: 136 MMOL/L
WBC # BLD AUTO: 6.31 K/UL

## 2017-12-28 PROCEDURE — 97530 THERAPEUTIC ACTIVITIES: CPT

## 2017-12-28 PROCEDURE — 25000003 PHARM REV CODE 250: Performed by: HOSPITALIST

## 2017-12-28 PROCEDURE — 94761 N-INVAS EAR/PLS OXIMETRY MLT: CPT

## 2017-12-28 PROCEDURE — 25000242 PHARM REV CODE 250 ALT 637 W/ HCPCS: Performed by: HOSPITALIST

## 2017-12-28 PROCEDURE — 80048 BASIC METABOLIC PNL TOTAL CA: CPT

## 2017-12-28 PROCEDURE — 84100 ASSAY OF PHOSPHORUS: CPT

## 2017-12-28 PROCEDURE — 83735 ASSAY OF MAGNESIUM: CPT

## 2017-12-28 PROCEDURE — 25000003 PHARM REV CODE 250: Performed by: PHYSICIAN ASSISTANT

## 2017-12-28 PROCEDURE — 94640 AIRWAY INHALATION TREATMENT: CPT

## 2017-12-28 PROCEDURE — 97535 SELF CARE MNGMENT TRAINING: CPT

## 2017-12-28 PROCEDURE — 85025 COMPLETE CBC W/AUTO DIFF WBC: CPT

## 2017-12-28 PROCEDURE — 36415 COLL VENOUS BLD VENIPUNCTURE: CPT

## 2017-12-28 PROCEDURE — 11000001 HC ACUTE MED/SURG PRIVATE ROOM

## 2017-12-28 RX ORDER — CARVEDILOL 12.5 MG/1
12.5 TABLET ORAL 2 TIMES DAILY
Status: DISCONTINUED | OUTPATIENT
Start: 2017-12-28 | End: 2017-12-29 | Stop reason: HOSPADM

## 2017-12-28 RX ORDER — LANSOPRAZOLE 30 MG/1
30 TABLET, ORALLY DISINTEGRATING, DELAYED RELEASE ORAL DAILY PRN
Status: ON HOLD
Start: 2017-12-28 | End: 2018-01-10 | Stop reason: HOSPADM

## 2017-12-28 RX ADMIN — CARVEDILOL 12.5 MG: 12.5 TABLET, FILM COATED ORAL at 08:12

## 2017-12-28 RX ADMIN — AMOXICILLIN AND CLAVULANATE POTASSIUM 500 MG: 500; 125 TABLET, FILM COATED ORAL at 08:12

## 2017-12-28 RX ADMIN — IPRATROPIUM BROMIDE AND ALBUTEROL SULFATE 3 ML: .5; 3 SOLUTION RESPIRATORY (INHALATION) at 07:12

## 2017-12-28 RX ADMIN — CARVEDILOL 6.25 MG: 6.25 TABLET, FILM COATED ORAL at 09:12

## 2017-12-28 RX ADMIN — APIXABAN 2.5 MG: 2.5 TABLET, FILM COATED ORAL at 08:12

## 2017-12-28 RX ADMIN — IPRATROPIUM BROMIDE AND ALBUTEROL SULFATE 3 ML: .5; 3 SOLUTION RESPIRATORY (INHALATION) at 03:12

## 2017-12-28 RX ADMIN — ASPIRIN 81 MG: 81 TABLET, COATED ORAL at 08:12

## 2017-12-28 RX ADMIN — IPRATROPIUM BROMIDE AND ALBUTEROL SULFATE 3 ML: .5; 3 SOLUTION RESPIRATORY (INHALATION) at 12:12

## 2017-12-28 RX ADMIN — GABAPENTIN 300 MG: 300 CAPSULE ORAL at 08:12

## 2017-12-28 RX ADMIN — TAMSULOSIN HYDROCHLORIDE 0.4 MG: 0.4 CAPSULE ORAL at 08:12

## 2017-12-28 NOTE — PLAN OF CARE
TN met with patient, son at bedside.  TN updated patient and son. [facility] still reviewing patient, earliest he can be discharged is tomorrow (3 night IP Medicare rule). TN or SW will notify family when and if accepted.TN also encouraged patient to work with therapy. Will continue to follow.     12/28/17 1232   Discharge Reassessment   Assessment Type Discharge Planning Reassessment   Provided patient/caregiver education on the expected discharge date and the discharge plan Yes   Do you have any problems affording any of your prescribed medications? TBD   Discharge Plan A Skilled Nursing Facility   Discharge Plan B Home with family;Home Health   Patient choice form signed by patient/caregiver N/A   Can the patient answer the patient profile reliably? Yes, cognitively intact   How does the patient rate their overall health at the present time? Good   Describe the patient's ability to walk at the present time. Walks with the help of equipment   How often would a person be available to care for the patient? Occasionally     Ninfa Fitzgerald RN  Transition Navigator  [phone]

## 2017-12-28 NOTE — ASSESSMENT & PLAN NOTE
Check blood glucose AC and HS and use SSI.  A1c 5.6% on 12/13/17.  Take insulin glargine 25 units daily at home.  Holding due to low A1c.

## 2017-12-28 NOTE — PROGRESS NOTES
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  VILMA beebe lives alone in [place], [place], and his son, Joseph Valerio Jr ([phone]) checks on him frequently.  He is retired and used to work as a  and an  in [employer].  He was exposed to asbestos.  He smoked 3.5 packs per day around 30 years and quit in 1990.   He denies use of alcohol or illicit drugs.  His primary care physician is Dr. Thompson Prince. His last clinic visit was on 12/20/2017 and Dr. Prince ordered a CT chest scan: "Mild persistent peribronchial tree in bud nodularity, most pronounced in the right upper and lower lobes. Findings could reflect atypical infectious etiology, inflammation, or sequela of aspiration."  He's had previous short hospitalizations for pneumonia and saw Pulmonology, Dr. Ho, on 7/28/2017. He ordered repeat CT scan in September 2017 (not done) and PFTs at next visit (1/23/18).               He was hospitalized at [facility] from 11/6/17-11/8/17 for rhabdomyolysis.  He was discharged home with home health.              He was taken to [facility] Emergency Department the morning of 12/26/17 with hypoxia and lethargy.  His oxygen saturation was 85% per EMS, who placed him on CPAP, which improved his " hypoxia.  His son reported that he was disoriented for the past 2 days and did not recognize him the night before.  He had a productive cough with thick white sputum.  In the ED, he was hypertensive with blood pressure of 204/90.  He was placed on BiPAP.  WBC count was 14,590.  Troponin was 0.084 and BNP was 365, but his BNP has been similarly high in the past.  Chest x-ray showed a right costophrenic angle and right lung base opacity.  He was given cefepime and vancomycin.  He was weaned to room air and blood pressures decreased before admission to [facility].  His son reported that Mr. Valerio's cognition had been waxing and waning for over 2 months, and believed that he needed placement in a nursing facility at least temporarily because he was unable to take full-time care of him at this time.     Hospital Course:  He remained on room air.  WBC count was normal the next morning.  Renal function was improving.  Troponin increased and Cardiology felt it was just demand ischemia from being in respiratory distress.  Case management began working on skilled nursing facility placement.  Amoxicillin-clavulanate was given for his pneumonia.  He remained stable throughout the hospital course.    Interval History: No acute events.    Review of Systems   Constitutional: Negative for chills and fever.   Respiratory: Negative for shortness of breath.    Cardiovascular: Negative for chest pain and palpitations.   Gastrointestinal: Negative for abdominal pain, nausea and vomiting.   Skin: Positive for wound.   Neurological: Negative for headaches.     Objective:     Vital Signs (Most Recent):  Temp: 98 °F (36.7 °C) (12/28/17 0810)  Pulse: 60 (12/28/17 0810)  Resp: 20 (12/28/17 0810)  BP: (!) 169/77 (12/28/17 0810)  SpO2: 95 % (12/28/17 0733) Vital Signs (24h Range):  Temp:  [96.9 °F (36.1 °C)-99 °F (37.2 °C)] 98 °F (36.7 °C)  Pulse:  [47-84] 60  Resp:  [16-20] 20  SpO2:  [95 %-97 %] 95 %  BP: (101-169)/(51-77)  169/77     Weight: 105.1 kg (231 lb 11.3 oz)  Body mass index is 33.25 kg/m².    Intake/Output Summary (Last 24 hours) at 12/28/17 0905  Last data filed at 12/28/17 0600   Gross per 24 hour   Intake              240 ml   Output              800 ml   Net             -560 ml      Physical Exam   Constitutional: He is oriented to person, place, and time. No distress.   Cardiovascular: Normal rate.  An irregularly irregular rhythm present.   Pulmonary/Chest: Effort normal. No respiratory distress.   Abdominal: Soft. Bowel sounds are normal. He exhibits no distension.   Neurological: He is alert and oriented to person, place, and time.   Skin:   Healing wounds on bilateral forearms with surrounding ecchymoses.    Psychiatric: He has a normal mood and affect. Thought content normal.   Nursing note and vitals reviewed.    Significant Labs:   BMP:     Recent Labs  Lab 12/27/17  0401   *      K 4.3      CO2 27   BUN 44*   CREATININE 2.8*   CALCIUM 8.3*   MG 1.9     CBC:     Recent Labs  Lab 12/27/17  0401 12/28/17  0800   WBC 10.61 6.31   HGB 9.3* 8.5*   HCT 31.3* 27.9*    193     POCT Glucose:     Recent Labs  Lab 12/27/17  1600 12/27/17  2052 12/28/17  0619   POCTGLUCOSE 281* 272* 134*       Significant Imaging:   X-Ray Chest AP Portable 12/26/17: The cardiomediastinal silhouette is enlarged, unchanged prior study. The visualized airway is unremarkable.  There is increased opacity and obscuration of the right costophrenic angle with additional patchy airspace opacities in the right lung base, suggestive of underlying pleural fluid with consolidative change, which can be seen with underlying infectious process such as pneumonia and/or aspiration.  No evidence of pneumothorax.  Previously identified right rib fractures not as well delineated on today's exam.  Osseous structures demonstrate no acute abnormality.  Impression: Increased opacity and obscuration of the right costophrenic angle with  patchy opacity of the right lung base, possibly representing component of pleural fluid and developing infiltrate.  2D echo with color flow doppler 12/27/17:     1 - Normal left ventricular systolic function (EF 60-65%).     2 - Normal right ventricular systolic function .     3 - The estimated PA systolic pressure is 13 mmHg.     4 - Mild tricuspid regurgitation.     5 - No wall motion abnormalities.     6 - Concentric remodeling.     Assessment/Plan:      * Pneumonia of right lower lobe due to infectious organism    Continue amoxicillin-clavulanate course.  Has scheduled follow-up with Pulmonology in January.          Biatrial enlargement    Prescribed furosemide 40 mg daily, carvedilol 20 mg daily, and losartan 100 mg daily. Holding furosemide and losartan for JOHN PAUL.            JOHN PAUL (acute kidney injury)    CKD 3-4  Appears baseline creatinine is 1.8-2.1. 2.8 today, down from 3.0 on admission.  Pt on furosemide and losartan at home but unsure if actually taking. Continue to hold here for JOHN PAUL.  Avoid nephrotoxins and renally dose meds. Monitor. Renal ultrasound if not improving.            Persistent atrial fibrillation    Continue home apixaban 2.5 mg BID which was started on 7/10/17 by Cardiology at [facility].           Coronary artery disease involving native coronary artery of native heart without angina pectoris    Not on antiplatelet therapy at home.  Started aspirin 81 mg daily.          Obstructive sleep apnea on CPAP    O2 sat > 92% on room air. Pt states does not use CPAP at home regularly. Did not tolerate BiPap earlier today so will hold for now.  Supplemental oxygen as needed to keep sats > 92%.           Benign prostatic hyperplasia    Continue home tamsulosin 0.4 mg daily. No  complaints.           Essential hypertension    Takes carvedilol CR 20 mg 24 hour capsule once daily, losartan 100 mg daily.  Give carvedilol 12.5 mg BID.  Continue hold on losartan for JOHN PAUL.  Monitor.           Type 2  diabetes mellitus with stage 3 chronic kidney disease, with long-term current use of insulin    Check blood glucose AC and HS and use SSI.  A1c 5.6% on 12/13/17.  Take insulin glargine 25 units daily at home.  Holding due to low A1c.             VTE Risk Mitigation         Ordered     apixaban tablet 2.5 mg  2 times daily     Route:  Oral        12/26/17 1956     Medium Risk of VTE  Once      12/26/17 1551     Reason for No Pharmacological VTE Prophylaxis  Once      12/26/17 1551        Disposition: 3 midnight requirement to go to SNF tomorrow.      Anton Wolff MD  Department of Hospital Medicine   [facility]

## 2017-12-28 NOTE — ASSESSMENT & PLAN NOTE
Continue home apixaban 2.5 mg BID which was started on 7/10/17 by Cardiology at [facility].

## 2017-12-28 NOTE — PLAN OF CARE
Problem: Patient Care Overview  Goal: Plan of Care Review  Outcome: Ongoing (interventions implemented as appropriate)  Plan of care reviewed with patient. PPD placed today in left forearm. PO antibiotics administered for infection. Patient continues to produce brown sputum. Bed alarm on, bed locked and low, side rails up x2, and call bell in reach. Tele monitor in place. No red alarms or ectopy noted during shift. Plan of care is for patient to go to SNF. Patient verbalizes understanding of plan of care.

## 2017-12-28 NOTE — PLAN OF CARE
Problem: Occupational Therapy Goal  Goal: Occupational Therapy Goal  Goals to be met by: 1/27/18     Patient will increase functional independence with ADLs by performing:    LE Dressing with Minimal Assistance.  Grooming while standing with Contact Guard Assistance.  Toileting from toilet with Minimal Assistance for hygiene and clothing management.   Supine to sit with CG Assistance.  Stand pivot transfers with Contact Guard Assistance.  Toilet transfer to toilet with Contact Guard Assistance.  Increased functional strength to WFL for self care skills and functional mobility.  Upper extremity exercise program x10 reps per handout, with assist as needed .     Outcome: Ongoing (interventions implemented as appropriate)  Joseph Valerio is a 82 y.o. male with a medical diagnosis of Pneumonia of right lower lobe due to infectious organism.  He presents with decreased cognition and decreased overall strength, endurance, balance and [place] and safety with ADL's and fx mobility. Pt with improved performance with therapy today and no complaints. Continue OT services to address functional goals, progressing as able.  GREGORY Miles/DILIA

## 2017-12-28 NOTE — ASSESSMENT & PLAN NOTE
Takes carvedilol CR 20 mg 24 hour capsule once daily, losartan 100 mg daily.  Give carvedilol 12.5 mg BID.  Continue hold on losartan for JOHN PAUL.  Monitor.

## 2017-12-28 NOTE — PT/OT/SLP PROGRESS
Physical Therapy      Patient Name:  Joseph Valerio   MRN:  [id number]    Patient not seen today secondary to  (pt tired from earlier rx with OT,refusal). Will follow-up tomorrow.    Hayes Shaikh, PTA

## 2017-12-28 NOTE — PLAN OF CARE
Problem: Patient Care Overview  Goal: Plan of Care Review  Lungs clear, on oral antibiotics.  Repositioned every two hours.  Mepilex to sacrum.  Fall precautions maintained. Bed alarm on.  No problems with blood glucose. Will continue to monitor VS, telemetry labs, and medications as ordered.

## 2017-12-28 NOTE — PLAN OF CARE
Problem: Patient Care Overview  Goal: Plan of Care Review  Outcome: Ongoing (interventions implemented as appropriate)  Patient on RA, no respiratory distress noted. The proper method of use, as well as anticipated side effects, of this aerosol treatment are discussed and demonstrated to the patient. Will continue to monitor.

## 2017-12-28 NOTE — PT/OT/SLP PROGRESS
Occupational Therapy   Treatment    Name: Joseph Valerio  MRN: [id number]  Admitting Diagnosis:  Pneumonia of right lower lobe due to infectious organism       Recommendations:     Discharge Recommendations: nursing facility, skilled  Discharge Equipment Recommendations:   (TBD)  Barriers to discharge:  Decreased caregiver support, Inaccessible home environment    Subjective     Communicated with: RN prior to session.  Pain/Comfort:  · Pain Rating 1: 0/10  · Pain Rating Post-Intervention 1: 0/10    Objective:     Patient found with: peripheral IV    General Precautions: Standard, fall   Orthopedic Precautions:N/A   Braces: N/A     Occupational Performance:    Bed Mobility:    · Patient completed Rolling/Turning to Right with stand by assistance, with side rail and vc's for technique and to reach for side rail  · Patient completed Scooting/Bridging with moderate assistance, scoot seated to EOB  · Patient completed Supine to Sit with minimum assistance and increased time and effort, vc's for effective technique  · Patient completed Sit to Supine with minimum assistance and for BLE lift     Functional Mobility/Transfers:  · Patient completed Sit <> Stand Transfer with minimum assistance  with  rolling walker   · Patient completed Bed <> Chair Transfer using Stand Pivot technique with minimum assistance with rolling walker  · Patient completed Toilet Transfer Stand Pivot technique with maximal assistance with  rolling walker and sit>stand from low surface after sitting on commode for 10 min,.    Activities of Daily Living:  · Grooming: stand by assistance and chair level    · LB Dressing: maximal assistance jacquie socks  · Toileting: moderate assistance to thoroughly clean backside    Patient left HOB elevated with all lines intact, call button in reach, bed alarm on and RN notified    AMPAC 6 Click:  AMPAC Total Score: 16    Treatment & Education:  Pt confused at times, with decreased safety awareness.  Pt ambulated to  and from bathroom ~20 steps total with Min A using RW.  Pt required assist for RW mgmt and safety.  Pt stood at sink to wash hands with CGA.   Education:    Assessment:     Joseph Valerio is a 82 y.o. male with a medical diagnosis of Pneumonia of right lower lobe due to infectious organism.  He presents with decreased cognition and decreased overall strength, endurance, balance and [place] and safety with ADL's and fx mobility. Pt with improved performance with therapy today and no complaints. Continue OT services to address functional goals, progressing as able.      Rehab Prognosis:  good; patient would benefit from acute skilled OT services to address these deficits and reach maximum level of function.       Plan:     Patient to be seen 5 x/week to address the above listed problems via self-care/home management, therapeutic activities, therapeutic exercises  · Plan of Care Expires: 01/27/18  · Plan of Care Reviewed with: patient    This Plan of care has been discussed with the patient who was involved in its development and understands and is in agreement with the identified goals and treatment plan    GOALS:    Occupational Therapy Goals        Problem: Occupational Therapy Goal    Goal Priority Disciplines Outcome Interventions   Occupational Therapy Goal     OT, PT/OT Ongoing (interventions implemented as appropriate)    Description:  Goals to be met by: 1/27/18     Patient will increase functional independence with ADLs by performing:    LE Dressing with Minimal Assistance.  Grooming while standing with Contact Guard Assistance.  Toileting from toilet with Minimal Assistance for hygiene and clothing management.   Supine to sit with CG Assistance.  Stand pivot transfers with Contact Guard Assistance.  Toilet transfer to toilet with Contact Guard Assistance.  Increased functional strength to WFL for self care skills and functional mobility.  Upper extremity exercise program x10 reps per handout, with  assist as needed .                      Time Tracking:     OT Date of Treatment: 12/28/17  OT Start Time: 1030  OT Stop Time: 1120  OT Total Time (min): 50 min    Billable Minutes:Self Care/Home Management 30  Therapeutic Activity 20    GENI Miles  12/28/2017

## 2017-12-28 NOTE — PROGRESS NOTES
The Sw spoke to Stella at [facility] and she still wants to see the OT notes from today but states the pt looks like he's heading in the right direction. She will f/u with the  tomorrow in reference to this pt b/c he can not d/c to snf until Friday. She's still reviewing the pt currently for admittance to their facility.

## 2017-12-28 NOTE — SUBJECTIVE & OBJECTIVE
Interval History: No acute events.    Review of Systems   Constitutional: Negative for chills and fever.   Respiratory: Negative for shortness of breath.    Cardiovascular: Negative for chest pain and palpitations.   Gastrointestinal: Negative for abdominal pain, nausea and vomiting.   Skin: Positive for wound.   Neurological: Negative for headaches.     Objective:     Vital Signs (Most Recent):  Temp: 98 °F (36.7 °C) (12/28/17 0810)  Pulse: 60 (12/28/17 0810)  Resp: 20 (12/28/17 0810)  BP: (!) 169/77 (12/28/17 0810)  SpO2: 95 % (12/28/17 0733) Vital Signs (24h Range):  Temp:  [96.9 °F (36.1 °C)-99 °F (37.2 °C)] 98 °F (36.7 °C)  Pulse:  [47-84] 60  Resp:  [16-20] 20  SpO2:  [95 %-97 %] 95 %  BP: (101-169)/(51-77) 169/77     Weight: 105.1 kg (231 lb 11.3 oz)  Body mass index is 33.25 kg/m².    Intake/Output Summary (Last 24 hours) at 12/28/17 0905  Last data filed at 12/28/17 0600   Gross per 24 hour   Intake              240 ml   Output              800 ml   Net             -560 ml      Physical Exam   Constitutional: He is oriented to person, place, and time. No distress.   Cardiovascular: Normal rate.  An irregularly irregular rhythm present.   Pulmonary/Chest: Effort normal. No respiratory distress.   Abdominal: Soft. Bowel sounds are normal. He exhibits no distension.   Neurological: He is alert and oriented to person, place, and time.   Skin:   Healing wounds on bilateral forearms with surrounding ecchymoses.    Psychiatric: He has a normal mood and affect. Thought content normal.   Nursing note and vitals reviewed.    Significant Labs:   BMP:     Recent Labs  Lab 12/27/17  0401   *      K 4.3      CO2 27   BUN 44*   CREATININE 2.8*   CALCIUM 8.3*   MG 1.9     CBC:     Recent Labs  Lab 12/27/17  0401 12/28/17  0800   WBC 10.61 6.31   HGB 9.3* 8.5*   HCT 31.3* 27.9*    193     POCT Glucose:     Recent Labs  Lab 12/27/17  1600 12/27/17  2052 12/28/17  0619   POCTGLUCOSE 281* 272* 134*        Significant Imaging:   X-Ray Chest AP Portable 12/26/17: The cardiomediastinal silhouette is enlarged, unchanged prior study. The visualized airway is unremarkable.  There is increased opacity and obscuration of the right costophrenic angle with additional patchy airspace opacities in the right lung base, suggestive of underlying pleural fluid with consolidative change, which can be seen with underlying infectious process such as pneumonia and/or aspiration.  No evidence of pneumothorax.  Previously identified right rib fractures not as well delineated on today's exam.  Osseous structures demonstrate no acute abnormality.  Impression: Increased opacity and obscuration of the right costophrenic angle with patchy opacity of the right lung base, possibly representing component of pleural fluid and developing infiltrate.  2D echo with color flow doppler 12/27/17:     1 - Normal left ventricular systolic function (EF 60-65%).     2 - Normal right ventricular systolic function .     3 - The estimated PA systolic pressure is 13 mmHg.     4 - Mild tricuspid regurgitation.     5 - No wall motion abnormalities.     6 - Concentric remodeling.

## 2017-12-29 ENCOUNTER — HOSPITAL ENCOUNTER (INPATIENT)
Facility: HOSPITAL | Age: 82
LOS: 13 days | Discharge: HOME HEALTH | DRG: 194 | End: 2018-01-11
Attending: HOSPITALIST | Admitting: HOSPITALIST
Payer: MEDICARE

## 2017-12-29 VITALS
HEART RATE: 64 BPM | DIASTOLIC BLOOD PRESSURE: 75 MMHG | RESPIRATION RATE: 14 BRPM | SYSTOLIC BLOOD PRESSURE: 162 MMHG | HEIGHT: 70 IN | TEMPERATURE: 97 F | WEIGHT: 231.69 LBS | OXYGEN SATURATION: 97 % | BODY MASS INDEX: 33.17 KG/M2

## 2017-12-29 DIAGNOSIS — S41.102A ARM WOUND, LEFT, INITIAL ENCOUNTER: ICD-10-CM

## 2017-12-29 DIAGNOSIS — Z79.4 CONTROLLED TYPE 2 DIABETES MELLITUS WITH STAGE 4 CHRONIC KIDNEY DISEASE, WITH LONG-TERM CURRENT USE OF INSULIN: ICD-10-CM

## 2017-12-29 DIAGNOSIS — N40.0 BENIGN PROSTATIC HYPERPLASIA WITHOUT LOWER URINARY TRACT SYMPTOMS: Chronic | ICD-10-CM

## 2017-12-29 DIAGNOSIS — I87.8 VENOUS STASIS OF LOWER EXTREMITY: ICD-10-CM

## 2017-12-29 DIAGNOSIS — R53.81 DEBILITY: ICD-10-CM

## 2017-12-29 DIAGNOSIS — L89.96 PRESSURE INJURY OF DEEP TISSUE: ICD-10-CM

## 2017-12-29 DIAGNOSIS — I15.0 RENOVASCULAR HYPERTENSION: ICD-10-CM

## 2017-12-29 DIAGNOSIS — I48.19 PERSISTENT ATRIAL FIBRILLATION: Chronic | ICD-10-CM

## 2017-12-29 DIAGNOSIS — N18.30 TYPE 2 DIABETES MELLITUS WITH STAGE 3 CHRONIC KIDNEY DISEASE, WITH LONG-TERM CURRENT USE OF INSULIN: Chronic | ICD-10-CM

## 2017-12-29 DIAGNOSIS — E78.2 MIXED HYPERLIPIDEMIA: Chronic | ICD-10-CM

## 2017-12-29 DIAGNOSIS — E11.22 CONTROLLED TYPE 2 DIABETES MELLITUS WITH STAGE 4 CHRONIC KIDNEY DISEASE, WITH LONG-TERM CURRENT USE OF INSULIN: ICD-10-CM

## 2017-12-29 DIAGNOSIS — I25.10 CORONARY ARTERY DISEASE INVOLVING NATIVE CORONARY ARTERY OF NATIVE HEART WITHOUT ANGINA PECTORIS: Chronic | ICD-10-CM

## 2017-12-29 DIAGNOSIS — N18.4 CONTROLLED TYPE 2 DIABETES MELLITUS WITH STAGE 4 CHRONIC KIDNEY DISEASE, WITH LONG-TERM CURRENT USE OF INSULIN: ICD-10-CM

## 2017-12-29 DIAGNOSIS — E11.22 TYPE 2 DIABETES MELLITUS WITH STAGE 3 CHRONIC KIDNEY DISEASE, WITH LONG-TERM CURRENT USE OF INSULIN: Chronic | ICD-10-CM

## 2017-12-29 DIAGNOSIS — G47.33 OBSTRUCTIVE SLEEP APNEA ON CPAP: Chronic | ICD-10-CM

## 2017-12-29 DIAGNOSIS — N18.30 STAGE 3 CHRONIC KIDNEY DISEASE: Chronic | ICD-10-CM

## 2017-12-29 DIAGNOSIS — Z79.01 CHRONIC ANTICOAGULATION: Chronic | ICD-10-CM

## 2017-12-29 DIAGNOSIS — Z79.4 TYPE 2 DIABETES MELLITUS WITH STAGE 3 CHRONIC KIDNEY DISEASE, WITH LONG-TERM CURRENT USE OF INSULIN: Chronic | ICD-10-CM

## 2017-12-29 DIAGNOSIS — J18.9 PNEUMONIA OF RIGHT LOWER LOBE DUE TO INFECTIOUS ORGANISM: Primary | ICD-10-CM

## 2017-12-29 PROBLEM — N17.9 AKI (ACUTE KIDNEY INJURY): Status: RESOLVED | Noted: 2017-06-19 | Resolved: 2017-12-29

## 2017-12-29 LAB
ALBUMIN SERPL BCP-MCNC: 2.2 G/DL
ALP SERPL-CCNC: 61 U/L
ALT SERPL W/O P-5'-P-CCNC: 13 U/L
ANION GAP SERPL CALC-SCNC: 8 MMOL/L
AST SERPL-CCNC: 9 U/L
BACTERIA SPEC AEROBE CULT: NORMAL
BASOPHILS # BLD AUTO: 0.1 K/UL
BASOPHILS NFR BLD: 1.4 %
BILIRUB SERPL-MCNC: 0.6 MG/DL
BUN SERPL-MCNC: 41 MG/DL
CALCIUM SERPL-MCNC: 8.1 MG/DL
CHLORIDE SERPL-SCNC: 109 MMOL/L
CO2 SERPL-SCNC: 25 MMOL/L
CREAT SERPL-MCNC: 1.6 MG/DL
DIFFERENTIAL METHOD: ABNORMAL
EOSINOPHIL # BLD AUTO: 0.1 K/UL
EOSINOPHIL NFR BLD: 1.6 %
ERYTHROCYTE [DISTWIDTH] IN BLOOD BY AUTOMATED COUNT: 13.3 %
EST. GFR  (AFRICAN AMERICAN): 46 ML/MIN/1.73 M^2
EST. GFR  (NON AFRICAN AMERICAN): 40 ML/MIN/1.73 M^2
GLUCOSE SERPL-MCNC: 179 MG/DL
GRAM STN SPEC: NORMAL
HCT VFR BLD AUTO: 28.4 %
HGB BLD-MCNC: 8.8 G/DL
LYMPHOCYTES # BLD AUTO: 1.4 K/UL
LYMPHOCYTES NFR BLD: 19.5 %
MCH RBC QN AUTO: 27 PG
MCHC RBC AUTO-ENTMCNC: 31 G/DL
MCV RBC AUTO: 87 FL
MONOCYTES # BLD AUTO: 0.7 K/UL
MONOCYTES NFR BLD: 9.8 %
NEUTROPHILS # BLD AUTO: 4.5 K/UL
NEUTROPHILS NFR BLD: 65.7 %
PLATELET # BLD AUTO: 231 K/UL
PMV BLD AUTO: 11.4 FL
POCT GLUCOSE: 157 MG/DL (ref 70–110)
POCT GLUCOSE: 176 MG/DL (ref 70–110)
POCT GLUCOSE: 187 MG/DL (ref 70–110)
POCT GLUCOSE: 194 MG/DL (ref 70–110)
POCT GLUCOSE: 201 MG/DL (ref 70–110)
POCT GLUCOSE: 217 MG/DL (ref 70–110)
POTASSIUM SERPL-SCNC: 4.2 MMOL/L
PROT SERPL-MCNC: 5.5 G/DL
RBC # BLD AUTO: 3.26 M/UL
SODIUM SERPL-SCNC: 142 MMOL/L
WBC # BLD AUTO: 6.92 K/UL

## 2017-12-29 PROCEDURE — 25000242 PHARM REV CODE 250 ALT 637 W/ HCPCS: Performed by: HOSPITALIST

## 2017-12-29 PROCEDURE — 97530 THERAPEUTIC ACTIVITIES: CPT

## 2017-12-29 PROCEDURE — 85025 COMPLETE CBC W/AUTO DIFF WBC: CPT

## 2017-12-29 PROCEDURE — 25000003 PHARM REV CODE 250: Performed by: HOSPITALIST

## 2017-12-29 PROCEDURE — 94640 AIRWAY INHALATION TREATMENT: CPT

## 2017-12-29 PROCEDURE — 97116 GAIT TRAINING THERAPY: CPT

## 2017-12-29 PROCEDURE — 97535 SELF CARE MNGMENT TRAINING: CPT

## 2017-12-29 PROCEDURE — 36415 COLL VENOUS BLD VENIPUNCTURE: CPT

## 2017-12-29 PROCEDURE — 12000000 HC SNF SEMI-PRIVATE ROOM

## 2017-12-29 PROCEDURE — 94761 N-INVAS EAR/PLS OXIMETRY MLT: CPT

## 2017-12-29 PROCEDURE — 80053 COMPREHEN METABOLIC PANEL: CPT

## 2017-12-29 PROCEDURE — 25000003 PHARM REV CODE 250: Performed by: PHYSICIAN ASSISTANT

## 2017-12-29 RX ORDER — HYDROCODONE BITARTRATE AND ACETAMINOPHEN 5; 325 MG/1; MG/1
1 TABLET ORAL EVERY 6 HOURS PRN
Status: CANCELLED | OUTPATIENT
Start: 2017-12-29

## 2017-12-29 RX ORDER — LOSARTAN POTASSIUM 50 MG/1
100 TABLET ORAL DAILY
Status: CANCELLED | OUTPATIENT
Start: 2017-12-30

## 2017-12-29 RX ORDER — ONDANSETRON 2 MG/ML
4 INJECTION INTRAMUSCULAR; INTRAVENOUS EVERY 6 HOURS PRN
Status: CANCELLED | OUTPATIENT
Start: 2017-12-29

## 2017-12-29 RX ORDER — RAMELTEON 8 MG/1
8 TABLET ORAL NIGHTLY PRN
Status: DISCONTINUED | OUTPATIENT
Start: 2017-12-29 | End: 2017-12-30

## 2017-12-29 RX ORDER — ACETAMINOPHEN 325 MG/1
650 TABLET ORAL EVERY 6 HOURS PRN
Status: CANCELLED | OUTPATIENT
Start: 2017-12-29

## 2017-12-29 RX ORDER — ACETAMINOPHEN 325 MG/1
650 TABLET ORAL EVERY 6 HOURS PRN
Status: DISCONTINUED | OUTPATIENT
Start: 2017-12-29 | End: 2017-12-30

## 2017-12-29 RX ORDER — FUROSEMIDE 40 MG/1
40 TABLET ORAL DAILY
Status: DISCONTINUED | OUTPATIENT
Start: 2017-12-29 | End: 2017-12-29 | Stop reason: HOSPADM

## 2017-12-29 RX ORDER — GABAPENTIN 300 MG/1
300 CAPSULE ORAL NIGHTLY
Status: DISCONTINUED | OUTPATIENT
Start: 2017-12-29 | End: 2018-01-11 | Stop reason: HOSPADM

## 2017-12-29 RX ORDER — OXYCODONE HYDROCHLORIDE 5 MG/1
10 TABLET ORAL EVERY 6 HOURS PRN
Status: CANCELLED | OUTPATIENT
Start: 2017-12-29

## 2017-12-29 RX ORDER — ASPIRIN 81 MG/1
81 TABLET ORAL DAILY
Status: DISCONTINUED | OUTPATIENT
Start: 2017-12-30 | End: 2018-01-11 | Stop reason: HOSPADM

## 2017-12-29 RX ORDER — ONDANSETRON 8 MG/1
8 TABLET, ORALLY DISINTEGRATING ORAL EVERY 6 HOURS PRN
Status: DISCONTINUED | OUTPATIENT
Start: 2017-12-29 | End: 2018-01-11 | Stop reason: HOSPADM

## 2017-12-29 RX ORDER — IPRATROPIUM BROMIDE AND ALBUTEROL SULFATE 2.5; .5 MG/3ML; MG/3ML
3 SOLUTION RESPIRATORY (INHALATION) EVERY 4 HOURS PRN
Status: CANCELLED | OUTPATIENT
Start: 2017-12-29

## 2017-12-29 RX ORDER — LACTULOSE 10 G/15ML
15 SOLUTION ORAL EVERY 6 HOURS PRN
Status: CANCELLED | OUTPATIENT
Start: 2017-12-29

## 2017-12-29 RX ORDER — FUROSEMIDE 40 MG/1
40 TABLET ORAL DAILY
Status: DISCONTINUED | OUTPATIENT
Start: 2017-12-30 | End: 2018-01-04

## 2017-12-29 RX ORDER — AMOXICILLIN AND CLAVULANATE POTASSIUM 500; 125 MG/1; MG/1
1 TABLET, FILM COATED ORAL 2 TIMES DAILY
Status: COMPLETED | OUTPATIENT
Start: 2017-12-29 | End: 2018-01-01

## 2017-12-29 RX ORDER — FUROSEMIDE 40 MG/1
40 TABLET ORAL DAILY
Status: CANCELLED | OUTPATIENT
Start: 2017-12-30

## 2017-12-29 RX ORDER — ACETAMINOPHEN 325 MG/1
650 TABLET ORAL EVERY 4 HOURS PRN
Status: DISCONTINUED | OUTPATIENT
Start: 2017-12-29 | End: 2017-12-30

## 2017-12-29 RX ORDER — ACETAMINOPHEN 325 MG/1
650 TABLET ORAL EVERY 4 HOURS PRN
Status: CANCELLED | OUTPATIENT
Start: 2017-12-29

## 2017-12-29 RX ORDER — OXYCODONE HYDROCHLORIDE 5 MG/1
10 TABLET ORAL EVERY 6 HOURS PRN
Status: DISCONTINUED | OUTPATIENT
Start: 2017-12-29 | End: 2018-01-11 | Stop reason: HOSPADM

## 2017-12-29 RX ORDER — AMOXICILLIN AND CLAVULANATE POTASSIUM 500; 125 MG/1; MG/1
1 TABLET, FILM COATED ORAL 2 TIMES DAILY
Status: CANCELLED | OUTPATIENT
Start: 2017-12-29 | End: 2018-01-01

## 2017-12-29 RX ORDER — CARVEDILOL 12.5 MG/1
12.5 TABLET ORAL 2 TIMES DAILY
Status: DISCONTINUED | OUTPATIENT
Start: 2017-12-29 | End: 2018-01-11 | Stop reason: HOSPADM

## 2017-12-29 RX ORDER — HYDRALAZINE HYDROCHLORIDE 25 MG/1
25 TABLET, FILM COATED ORAL EVERY 8 HOURS PRN
Status: DISCONTINUED | OUTPATIENT
Start: 2017-12-29 | End: 2018-01-11 | Stop reason: HOSPADM

## 2017-12-29 RX ORDER — SODIUM CHLORIDE 0.9 % (FLUSH) 0.9 %
5 SYRINGE (ML) INJECTION
Status: DISCONTINUED | OUTPATIENT
Start: 2017-12-29 | End: 2017-12-30

## 2017-12-29 RX ORDER — LOSARTAN POTASSIUM 50 MG/1
100 TABLET ORAL DAILY
Status: DISCONTINUED | OUTPATIENT
Start: 2017-12-29 | End: 2017-12-29 | Stop reason: HOSPADM

## 2017-12-29 RX ORDER — TAMSULOSIN HYDROCHLORIDE 0.4 MG/1
0.4 CAPSULE ORAL DAILY
Status: CANCELLED | OUTPATIENT
Start: 2017-12-30

## 2017-12-29 RX ORDER — AMOXICILLIN 250 MG
1 CAPSULE ORAL 2 TIMES DAILY
Status: CANCELLED | OUTPATIENT
Start: 2017-12-29

## 2017-12-29 RX ORDER — GABAPENTIN 300 MG/1
300 CAPSULE ORAL NIGHTLY
Status: CANCELLED | OUTPATIENT
Start: 2017-12-29

## 2017-12-29 RX ORDER — CALCIUM CARBONATE 200(500)MG
500 TABLET,CHEWABLE ORAL 2 TIMES DAILY PRN
Status: DISCONTINUED | OUTPATIENT
Start: 2017-12-29 | End: 2018-01-11 | Stop reason: HOSPADM

## 2017-12-29 RX ORDER — RAMELTEON 8 MG/1
8 TABLET ORAL NIGHTLY PRN
Status: CANCELLED | OUTPATIENT
Start: 2017-12-29

## 2017-12-29 RX ORDER — ASPIRIN 81 MG/1
81 TABLET ORAL DAILY
Status: CANCELLED | OUTPATIENT
Start: 2017-12-30

## 2017-12-29 RX ORDER — CALCIUM CARBONATE 200(500)MG
500 TABLET,CHEWABLE ORAL 2 TIMES DAILY PRN
Status: CANCELLED | OUTPATIENT
Start: 2017-12-29

## 2017-12-29 RX ORDER — RAMELTEON 8 MG/1
8 TABLET ORAL NIGHTLY PRN
Status: DISCONTINUED | OUTPATIENT
Start: 2017-12-29 | End: 2018-01-11 | Stop reason: HOSPADM

## 2017-12-29 RX ORDER — LOSARTAN POTASSIUM 50 MG/1
100 TABLET ORAL DAILY
Status: DISCONTINUED | OUTPATIENT
Start: 2017-12-30 | End: 2018-01-04

## 2017-12-29 RX ORDER — LACTULOSE 10 G/15ML
15 SOLUTION ORAL EVERY 6 HOURS PRN
Status: DISCONTINUED | OUTPATIENT
Start: 2017-12-29 | End: 2018-01-11 | Stop reason: HOSPADM

## 2017-12-29 RX ORDER — ONDANSETRON 8 MG/1
8 TABLET, ORALLY DISINTEGRATING ORAL EVERY 6 HOURS PRN
Status: CANCELLED | OUTPATIENT
Start: 2017-12-29

## 2017-12-29 RX ORDER — LACTULOSE 10 G/15ML
15 SOLUTION ORAL EVERY 6 HOURS PRN
Status: DISCONTINUED | OUTPATIENT
Start: 2017-12-29 | End: 2017-12-29 | Stop reason: HOSPADM

## 2017-12-29 RX ORDER — SODIUM CHLORIDE 0.9 % (FLUSH) 0.9 %
5 SYRINGE (ML) INJECTION
Status: CANCELLED | OUTPATIENT
Start: 2017-12-29

## 2017-12-29 RX ORDER — IPRATROPIUM BROMIDE AND ALBUTEROL SULFATE 2.5; .5 MG/3ML; MG/3ML
3 SOLUTION RESPIRATORY (INHALATION) EVERY 4 HOURS PRN
Status: DISCONTINUED | OUTPATIENT
Start: 2017-12-29 | End: 2018-01-11 | Stop reason: HOSPADM

## 2017-12-29 RX ORDER — CARVEDILOL 12.5 MG/1
12.5 TABLET ORAL 2 TIMES DAILY
Status: CANCELLED | OUTPATIENT
Start: 2017-12-29

## 2017-12-29 RX ORDER — ONDANSETRON 2 MG/ML
4 INJECTION INTRAMUSCULAR; INTRAVENOUS EVERY 6 HOURS PRN
Status: DISCONTINUED | OUTPATIENT
Start: 2017-12-29 | End: 2017-12-30

## 2017-12-29 RX ORDER — HYDROCODONE BITARTRATE AND ACETAMINOPHEN 5; 325 MG/1; MG/1
1 TABLET ORAL EVERY 6 HOURS PRN
Status: DISCONTINUED | OUTPATIENT
Start: 2017-12-29 | End: 2017-12-30

## 2017-12-29 RX ORDER — AMOXICILLIN 250 MG
1 CAPSULE ORAL 2 TIMES DAILY
Status: DISCONTINUED | OUTPATIENT
Start: 2017-12-29 | End: 2018-01-11 | Stop reason: HOSPADM

## 2017-12-29 RX ORDER — TAMSULOSIN HYDROCHLORIDE 0.4 MG/1
0.4 CAPSULE ORAL DAILY
Status: DISCONTINUED | OUTPATIENT
Start: 2017-12-30 | End: 2018-01-11 | Stop reason: HOSPADM

## 2017-12-29 RX ADMIN — AMOXICILLIN AND CLAVULANATE POTASSIUM 500 MG: 500; 125 TABLET, FILM COATED ORAL at 08:12

## 2017-12-29 RX ADMIN — ASPIRIN 81 MG: 81 TABLET, COATED ORAL at 08:12

## 2017-12-29 RX ADMIN — IPRATROPIUM BROMIDE AND ALBUTEROL SULFATE 3 ML: .5; 3 SOLUTION RESPIRATORY (INHALATION) at 11:12

## 2017-12-29 RX ADMIN — CARVEDILOL 12.5 MG: 12.5 TABLET, FILM COATED ORAL at 08:12

## 2017-12-29 RX ADMIN — GABAPENTIN 300 MG: 300 CAPSULE ORAL at 08:12

## 2017-12-29 RX ADMIN — APIXABAN 2.5 MG: 2.5 TABLET, FILM COATED ORAL at 08:12

## 2017-12-29 RX ADMIN — RAMELTEON 8 MG: 8 TABLET, FILM COATED ORAL at 08:12

## 2017-12-29 RX ADMIN — TAMSULOSIN HYDROCHLORIDE 0.4 MG: 0.4 CAPSULE ORAL at 08:12

## 2017-12-29 RX ADMIN — IPRATROPIUM BROMIDE AND ALBUTEROL SULFATE 3 ML: .5; 3 SOLUTION RESPIRATORY (INHALATION) at 08:12

## 2017-12-29 NOTE — PROGRESS NOTES
Pt. Received from [address] per betty's transportation via w/c.pt. oriented to routine of unit pt. voices no complaints at present time.nessa continue to monitor.

## 2017-12-29 NOTE — PLAN OF CARE
Removed IV, tip intact. Went over discharge/transfer instructions with the patient, verbalized understanding.

## 2017-12-29 NOTE — SUBJECTIVE & OBJECTIVE
Interval History: No acute events.    Review of Systems   Constitutional: Negative for chills and fever.   Gastrointestinal: Negative for abdominal pain, nausea and vomiting.     Objective:     Vital Signs (Most Recent):  Temp: 98.9 °F (37.2 °C) (12/29/17 1149)  Pulse: 63 (12/29/17 1151)  Resp: 18 (12/29/17 1151)  BP: (!) 155/77 (12/29/17 1149)  SpO2: 97 % (12/29/17 1151) Vital Signs (24h Range):  Temp:  [98.1 °F (36.7 °C)-99 °F (37.2 °C)] 98.9 °F (37.2 °C)  Pulse:  [56-72] 63  Resp:  [16-20] 18  SpO2:  [94 %-98 %] 97 %  BP: (148-192)/(68-83) 155/77     Weight: 105.1 kg (231 lb 11.3 oz)  Body mass index is 33.25 kg/m².    Intake/Output Summary (Last 24 hours) at 12/29/17 1210  Last data filed at 12/29/17 0800   Gross per 24 hour   Intake              305 ml   Output             1150 ml   Net             -845 ml      Physical Exam   Constitutional: He is oriented to person, place, and time. No distress.   Pulmonary/Chest: Effort normal. No respiratory distress.   Neurological: He is alert and oriented to person, place, and time.   Psychiatric: He has a normal mood and affect. Thought content normal.   Nursing note and vitals reviewed.    Significant Labs:   BMP:     Recent Labs  Lab 12/28/17  0800   *      K 3.8      CO2 24   BUN 44*   CREATININE 2.3*   CALCIUM 8.1*   MG 1.9     CBC:     Recent Labs  Lab 12/28/17  0800 12/29/17  1122   WBC 6.31 6.92   HGB 8.5* 8.8*   HCT 27.9* 28.4*    231     POCT Glucose:     Recent Labs  Lab 12/28/17  2058 12/29/17  0536 12/29/17  1149   POCTGLUCOSE 184* 201* 157*       Significant Imaging: None new

## 2017-12-29 NOTE — PLAN OF CARE
TN spoke with Stella at [facility].   She stated orders look good, and TN can set up transport. Patient will be going to room [address]. TN informed nurse Erin to call report discharge packet given. She stated patient can travel via wheelchair, no oxygen needed.    [facility]: [phone]  [facility]: [phone]    TN called patient's son to notify him of discharge today to [facility]. He is agreeable. TN also went to meet with patient, daughter-in-law in room. They are also aware of discharge today. Patient choice form signed.     TN set up transport with [facility] for patient (wheelchair no oxygen).    [facility]  Go to    [address] [facility]  [facility] [address]  [phone]   Thompson Stiles MD  Schedule an appointment as soon as possible for a visit  after skilled nursing facility stay  [address]  [place] [address]  [phone]     Future Appointments  Date Time Provider Department Center   1/23/2018 2:30 PM Awilda Ho MD [facility] PULMS[facility]   2/16/2018 4:40 PM Brandon Johansen MD [facility] NEPHRO [facility]   3/22/2018 9:30 AM Thompson Stiles MD [facility] [place]        12/29/17 1313   Final Note   Assessment Type Final Discharge Note   Discharge Disposition SNF   What phone number can be called within the next 1-3 days to see how you are doing after discharge? [phone]   Hospital Follow Up  Appt(s) scheduled? Yes   Discharge plans and expectations educations in teach back method with documentation complete? Yes   Right Care Referral Info   Post Acute Recommendation SNF / Sub-Acute Rehab   Referral Type SNF   Facility Name [facility]     Ninfa Fitzgerald RN  Transition Navigator  [phone]

## 2017-12-29 NOTE — SUBJECTIVE & OBJECTIVE
Interval History: No acute events.    Review of Systems   Constitutional: Negative for chills and fever.   Gastrointestinal: Negative for abdominal pain, nausea and vomiting.     Objective:     Vital Signs (Most Recent):  Temp: 98.4 °F (36.9 °C) (12/29/17 0440)  Pulse: 67 (12/29/17 0440)  Resp: 20 (12/29/17 0440)  BP: (!) 148/71 (12/29/17 0440)  SpO2: (!) 94 % (12/29/17 0410) Vital Signs (24h Range):  Temp:  [98 °F (36.7 °C)-99 °F (37.2 °C)] 98.4 °F (36.9 °C)  Pulse:  [56-72] 67  Resp:  [16-20] 20  SpO2:  [94 %-98 %] 94 %  BP: (148-192)/(68-83) 148/71     Weight: 105.1 kg (231 lb 11.3 oz)  Body mass index is 33.25 kg/m².    Intake/Output Summary (Last 24 hours) at 12/29/17 0738  Last data filed at 12/29/17 0700   Gross per 24 hour   Intake                0 ml   Output              950 ml   Net             -950 ml      Physical Exam   Constitutional: He is oriented to person, place, and time. No distress.   Pulmonary/Chest: Effort normal. No respiratory distress.   Neurological: He is alert and oriented to person, place, and time.   Psychiatric: He has a normal mood and affect. Thought content normal.   Nursing note and vitals reviewed.    Significant Labs:   BMP:     Recent Labs  Lab 12/28/17  0800   *      K 3.8      CO2 24   BUN 44*   CREATININE 2.3*   CALCIUM 8.1*   MG 1.9     CBC:     Recent Labs  Lab 12/28/17  0800   WBC 6.31   HGB 8.5*   HCT 27.9*        POCT Glucose:     Recent Labs  Lab 12/28/17  1648 12/28/17  2058 12/29/17  0536   POCTGLUCOSE 196* 184* 201*       Significant Imaging: None new

## 2017-12-29 NOTE — PLAN OF CARE
Problem: Physical Therapy Goal  Goal: Physical Therapy Goal  Goals to be met by: 2018     Patient will increase functional independence with mobility by performin. Supine to sit with MInimal Assistance  2. Sit to stand transfer with Minimal Assistance  3. Gait  x 75 feet with Minimal Assistance using Rolling Walker.      Outcome: Ongoing (interventions implemented as appropriate)  Goals 1 and 2 met

## 2017-12-29 NOTE — PLAN OF CARE
Problem: Patient Care Overview  Goal: Plan of Care Review  Outcome: Ongoing (interventions implemented as appropriate)  On oral antibiotics.  Possible discharge today.  Encouraged repositioning every two hours.  Mepilex to sacrum.  Fall precautions maintained. Blood sugar 127 this AM.  Bed alarm on.  Will continue with plan of care.

## 2017-12-29 NOTE — PLAN OF CARE
TN left message for Stella at [facility] to inquire about acceptance. Per Stella's note, needs patient to work with PT cannot refuse therapy. TN will discuss with patient again.     --Update: TN spoke with Stella. She stated need patient to work with therapy to see how he does. She could possibly accept patient later today, if he does improve with therapy. However, she needs to see when patient works with therapy today.    --TN spoke with patient in am. Patient sitting in chair. He stated he worked with therapy this am. TN informed him cannot refused therapy, if he wants to go to a SNF. Patient verbalized understanding. TN awaiting PT note.    [facility] requesting lab work for today on patient, Dr. Wolff aware.     12/29/17 0819   Discharge Reassessment   Assessment Type Discharge Planning Reassessment   Provided patient/caregiver education on the expected discharge date and the discharge plan Yes   Do you have any problems affording any of your prescribed medications? No   Discharge Plan A Skilled Nursing Facility   Discharge Plan B Home with family;Home Health   Patient choice form signed by patient/caregiver N/A   Can the patient answer the patient profile reliably? Yes, cognitively intact   How does the patient rate their overall health at the present time? Fair   Describe the patient's ability to walk at the present time. Walks with the help of equipment   How often would a person be available to care for the patient? Occasionally     Ninfa Fitzgerald RN  Transition Navigator  [phone]

## 2017-12-29 NOTE — PROGRESS NOTES
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  VILMA beebe lives alone in [place], [place], and his son, Joseph Valerio Jr ([phone]) checks on him frequently.  He is retired and used to work as a  and an  in [employer].  He was exposed to asbestos.  He smoked 3.5 packs per day around 30 years and quit in 1990.   He denies use of alcohol or illicit drugs.  His primary care physician is Dr. Thompson Prince. His last clinic visit was on 12/20/2017 and Dr. Prince ordered a CT chest scan: "Mild persistent peribronchial tree in bud nodularity, most pronounced in the right upper and lower lobes. Findings could reflect atypical infectious etiology, inflammation, or sequela of aspiration."  He's had previous short hospitalizations for pneumonia and saw Pulmonology, Dr. Ho, on 7/28/2017. He ordered repeat CT scan in September 2017 (not done) and PFTs at next visit (1/23/18).               He was hospitalized at [facility] from 11/6/17-11/8/17 for rhabdomyolysis.  He was discharged home with home health.              He was taken to [facility] Emergency Department the morning of 12/26/17 with hypoxia and lethargy.  His oxygen saturation was 85% per EMS, who placed him on CPAP, which improved his " hypoxia.  His son reported that he was disoriented for the past 2 days and did not recognize him the night before.  He had a productive cough with thick white sputum.  In the ED, he was hypertensive with blood pressure of 204/90.  He was placed on BiPAP.  WBC count was 14,590.  Troponin was 0.084 and BNP was 365, but his BNP has been similarly high in the past.  Chest x-ray showed a right costophrenic angle and right lung base opacity.  He was given cefepime and vancomycin.  He was weaned to room air and blood pressures decreased before admission to [facility].  His son reported that Mr. Valerio's cognition had been waxing and waning for over 2 months, and believed that he needed placement in a nursing facility at least temporarily because he was unable to take full-time care of him at this time.     Hospital Course:  He remained on room air.  WBC count was normal the next morning.  Renal function was improving.  Troponin increased and Cardiology felt it was just demand ischemia from being in respiratory distress.  Case management began working on skilled nursing facility placement.  Amoxicillin-clavulanate was given for his pneumonia.  He remained stable throughout the hospital course.    Interval History: No acute events.    Review of Systems   Constitutional: Negative for chills and fever.   Gastrointestinal: Negative for abdominal pain, nausea and vomiting.     Objective:     Vital Signs (Most Recent):  Temp: 98.4 °F (36.9 °C) (12/29/17 0440)  Pulse: 67 (12/29/17 0440)  Resp: 20 (12/29/17 0440)  BP: (!) 148/71 (12/29/17 0440)  SpO2: (!) 94 % (12/29/17 0410) Vital Signs (24h Range):  Temp:  [98 °F (36.7 °C)-99 °F (37.2 °C)] 98.4 °F (36.9 °C)  Pulse:  [56-72] 67  Resp:  [16-20] 20  SpO2:  [94 %-98 %] 94 %  BP: (148-192)/(68-83) 148/71     Weight: 105.1 kg (231 lb 11.3 oz)  Body mass index is 33.25 kg/m².    Intake/Output Summary (Last 24 hours) at 12/29/17 7373  Last data filed at 12/29/17 0778    Gross per 24 hour   Intake                0 ml   Output              950 ml   Net             -950 ml      Physical Exam   Constitutional: He is oriented to person, place, and time. No distress.   Pulmonary/Chest: Effort normal. No respiratory distress.   Neurological: He is alert and oriented to person, place, and time.   Psychiatric: He has a normal mood and affect. Thought content normal.   Nursing note and vitals reviewed.    Significant Labs:   BMP:     Recent Labs  Lab 12/28/17  0800   *      K 3.8      CO2 24   BUN 44*   CREATININE 2.3*   CALCIUM 8.1*   MG 1.9     CBC:     Recent Labs  Lab 12/28/17  0800   WBC 6.31   HGB 8.5*   HCT 27.9*        POCT Glucose:     Recent Labs  Lab 12/28/17  1648 12/28/17  2058 12/29/17  0536   POCTGLUCOSE 196* 184* 201*       Significant Imaging: None new    Assessment/Plan:      * Pneumonia of right lower lobe due to infectious organism    Continue amoxicillin-clavulanate course.  Has scheduled follow-up with Pulmonology in January.          Biatrial enlargement    Prescribed furosemide 40 mg daily, carvedilol 20 mg daily, and losartan 100 mg daily. Holding furosemide and losartan for JOHN PAUL.            JOHN PAUL (acute kidney injury)    CKD 3-4  Appears baseline creatinine is 1.8-2.1. 2.8 today, down from 3.0 on admission.  Pt on furosemide and losartan at home but unsure if actually taking. Continue to hold here for JOHN PAUL.  Avoid nephrotoxins and renally dose meds. Monitor. Renal ultrasound if not improving.            Persistent atrial fibrillation    Continue home apixaban 2.5 mg BID which was started on 7/10/17 by Cardiology at [facility].           Coronary artery disease involving native coronary artery of native heart without angina pectoris    Not on antiplatelet therapy at home.  Started aspirin 81 mg daily.          Obstructive sleep apnea on CPAP    O2 sat > 92% on room air. Pt states does not use CPAP at home regularly. Did not tolerate BiPap  earlier today so will hold for now.  Supplemental oxygen as needed to keep sats > 92%.           Benign prostatic hyperplasia    Continue home tamsulosin 0.4 mg daily. No  complaints.           Essential hypertension    Takes carvedilol CR 20 mg 24 hour capsule once daily, losartan 100 mg daily.  Give carvedilol 12.5 mg BID.  Continue hold on losartan for JOHN PAUL.  Monitor.           Type 2 diabetes mellitus with stage 3 chronic kidney disease, with long-term current use of insulin    Check blood glucose AC and HS and use SSI.  A1c 5.6% on 12/13/17.  Take insulin glargine 25 units daily at home.  Holding due to low A1c.             VTE Risk Mitigation         Ordered     apixaban tablet 2.5 mg  2 times daily     Route:  Oral        12/26/17 1956     Medium Risk of VTE  Once      12/26/17 1551     Reason for No Pharmacological VTE Prophylaxis  Once      12/26/17 1551        Disposition: Awaiting SNF      Anton Wolff MD  Department of Hospital Medicine   [facility]

## 2017-12-29 NOTE — PLAN OF CARE
Problem: Occupational Therapy Goal  Goal: Occupational Therapy Goal  Goals to be met by: 1/27/18     Patient will increase functional independence with ADLs by performing:    LE Dressing with Minimal Assistance.  Grooming while standing with Contact Guard Assistance.  Toileting from toilet with Minimal Assistance for hygiene and clothing management.   Supine to sit with CG Assistance.  Stand pivot transfers with Contact Guard Assistance.  Toilet transfer to toilet with Contact Guard Assistance.  Increased functional strength to WFL for self care skills and functional mobility.  Upper extremity exercise program x10 reps per handout, with assist as needed .     Outcome: Unable to achieve outcome(s) by discharge  Pt has progressed well towards goals. To d/c to SNF this date to continue to work towards maximizing functional independence prior to returning home. Will d/c OT

## 2017-12-29 NOTE — DISCHARGE SUMMARY
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  VILMA beebe lives alone in [place], [place], and his son, Joseph Valerio Jr ([phone]) checks on him frequently.  He is retired and used to work as a  and an  in [employer].  He was exposed to asbestos.  He smoked 3.5 packs per day around 30 years and quit in 1990.   He denies use of alcohol or illicit drugs.  His primary care physician is Dr. Thompson Prince. His last clinic visit was on 12/20/2017 and Dr. Prince ordered a CT chest scan: "Mild persistent peribronchial tree in bud nodularity, most pronounced in the right upper and lower lobes. Findings could reflect atypical infectious etiology, inflammation, or sequela of aspiration."  He's had previous short hospitalizations for pneumonia and saw Pulmonology, Dr. Ho, on 7/28/2017. He ordered repeat CT scan in September 2017 (not done) and PFTs at next visit (1/23/18).               He was hospitalized at [facility] from 11/6/17-11/8/17 for rhabdomyolysis.  He was discharged home with home health.              He was taken to [facility] Emergency Department the morning of 12/26/17 with hypoxia and lethargy.  His oxygen saturation was 85% per EMS, who placed him on CPAP, which improved his hypoxia.  His son reported " that he was disoriented for the past 2 days and did not recognize him the night before.  He had a productive cough with thick white sputum.  In the ED, he was hypertensive with blood pressure of 204/90.  He was placed on BiPAP.  WBC count was 14,590.  Troponin was 0.084 and BNP was 365, but his BNP has been similarly high in the past.  Chest x-ray showed a right costophrenic angle and right lung base opacity.  He was given cefepime and vancomycin.  He was weaned to room air and blood pressures decreased before admission to [facility].  His son reported that Mr. Valerio's cognition had been waxing and waning for over 2 months, and believed that he needed placement in a nursing facility at least temporarily because he was unable to take full-time care of him at this time.       Hospital Course:   He remained on room air.  WBC count was normal the next morning.  Renal function was improving.  Troponin increased and Cardiology felt it was just demand ischemia from being in respiratory distress.  Case management began working on skilled nursing facility placement.  Amoxicillin-clavulanate was given for his pneumonia.  He remained stable throughout the hospital course.  Creatinine decreased to 1.6 by 12/29/17.  He was discharged to [facility] on this date.     Consults:   Consults         Status Ordering Provider     Inpatient consult to [facility]  Once     Provider:  Boston Parkinson MD    Completed ADÁN GAMBOA     Inpatient consult to [facility]  Once     Provider:  (Not yet assigned)    Acknowledged DANNY WEEKS     Inpatient consult to Social Work/Case Management  Once     Provider:  (Not yet assigned)    Acknowledged DANNY WEEKS        Final Active Diagnoses:    Diagnosis Date Noted POA    PRINCIPAL PROBLEM:  Pneumonia of right lower lobe due to infectious organism [J18.1] 12/26/2017 Yes    Elevated troponin [R74.8] 12/27/2017 Yes    Biatrial enlargement [I51.7]  12/26/2017 Yes     Chronic    Chronic anticoagulation - apixaban  [Z79.01] 11/07/2017 Not Applicable     Chronic    Stage 3 chronic kidney disease [N18.3] 11/07/2017 Yes     Chronic    Persistent atrial fibrillation [I48.1] 10/16/2014 Yes     Chronic    Type 2 diabetes mellitus with stage 3 chronic kidney disease, with long-term current use of insulin [E11.22, N18.3, Z79.4] 07/31/2012 Not Applicable     Chronic    Obstructive sleep apnea on CPAP [G47.33, Z99.89] 07/31/2012 Not Applicable     Chronic    Hyperlipidemia [E78.5] 07/31/2012 Yes     Chronic    Essential hypertension [I10] 07/31/2012 Yes     Chronic    Coronary artery disease involving native coronary artery of native heart without angina pectoris [I25.10] 07/31/2012 Yes     Chronic    Benign prostatic hyperplasia [N40.0] 07/31/2012 Yes     Chronic    Venous stasis of lower extremity [I87.8] 07/31/2012 Yes      Problems Resolved During this Admission:    Diagnosis Date Noted Date Resolved POA    Acute hypoxemic respiratory failure [J96.01] 12/26/2017 12/26/2017 Yes    Pulmonary hypertension [I27.20] 12/26/2017 12/27/2017 Yes    JOHN PAUL (acute kidney injury) [N17.9] 06/19/2017 12/29/2017 Yes       Discharged Condition: good    Disposition: Skilled Nursing Facility    Follow Up:  Follow-up Information     Go to [facility].    Specialty:  SNF Agency  Contact information:  [address]  [facility] [address]  [phone]             Schedule an appointment as soon as possible for a visit with Thompson Stiles MD.    Specialty:  Internal Medicine  Why:  after skilled nursing facility stay  Contact information:  [address]  [place] [address]  [phone]                 Patient Instructions:     Diet Diabetic 2000 Calories   Order Comments: cardiac     Change dressing (specify)   Order Comments: Cleanse right forearm and left lateral arm wounds with wound cleanser. Apply dry dressing and wrap  with roller gauze. Check and change every other day and as needed.     Vital signs per facility protocol     Intake and output per facility protocol     Skin assessment every shift      Notify Physician   Order Specific Question Answer Comments   Temperature (F) greater than 101    Systolic Blood Pressure SBP greater than or equal to 160    Systolic Blood Pressure SBP less than or equal to 90    Diastolic Blood Pressure DBP greater than or equal to 110    Diastolic Blood Pressure DBP less than or equal to 60    Pulse greater than or equal to 120    Pulse less than or equal to 50    Respirations Rate RR greater than or equal to 30    Respirations Rate RR less than or equal to 6    Urine output less than 60 over 2 hours   SPO2% less than 90      Activity: Per rehab recommendations     Daily weights     Full code     Pulse Oximetry         Significant Diagnostic Studies:    Recent Labs  Lab 12/26/17  0526 12/27/17  0401 12/28/17  0800 12/29/17  1150    144 136 142   K 4.1 4.3 3.8 4.2    109 108 109   CO2 23 27 24 25   BUN 38* 44* 44* 41*   CREATININE 3.0* 2.8* 2.3* 1.6*   CALCIUM 8.0* 8.3* 8.1* 8.1*   PROT 6.3  --   --  5.5*   BILITOT 0.8  --   --  0.6   ALKPHOS 90  --   --  61   ALT 29  --   --  13   AST 34  --   --  9*     X-Ray Chest AP Portable 12/26/17: The cardiomediastinal silhouette is enlarged, unchanged prior study. The visualized airway is unremarkable.  There is increased opacity and obscuration of the right costophrenic angle with additional patchy airspace opacities in the right lung base, suggestive of underlying pleural fluid with consolidative change, which can be seen with underlying infectious process such as pneumonia and/or aspiration.  No evidence of pneumothorax.  Previously identified right rib fractures not as well delineated on today's exam.  Osseous structures demonstrate no acute abnormality.  Impression: Increased opacity and obscuration of the right costophrenic angle with  patchy opacity of the right lung base, possibly representing component of pleural fluid and developing infiltrate.  2D echo with color flow doppler 12/27/17:     1 - Normal left ventricular systolic function (EF 60-65%).     2 - Normal right ventricular systolic function .     3 - The estimated PA systolic pressure is 13 mmHg.     4 - Mild tricuspid regurgitation.     5 - No wall motion abnormalities.     6 - Concentric remodeling.          Medications:  Reconciled Home Medications:   Current Discharge Medication List      CONTINUE these medications which have CHANGED    Details   lansoprazole (PREVACID SOLUTAB) 30 MG disintegrating tablet Take 1 tablet (30 mg total) by mouth daily as needed (acid reflux).    Associated Diagnoses: Lumbar disc disease         CONTINUE these medications which have NOT CHANGED    Details   acetaminophen (TYLENOL) 500 MG tablet Take 2 tablets (1,000 mg total) by mouth every 8 (eight) hours as needed for Pain. Pain Scaled < 7  Refills: 0      apixaban 2.5 mg Tab Take 1 tablet (2.5 mg total) by mouth 2 (two) times daily.  Qty: 60 tablet, Refills: 11    Associated Diagnoses: Persistent atrial fibrillation      COREG CR 20 mg 24 hr capsule Take 20 mg by mouth once daily.       furosemide (LASIX) 40 MG tablet Take 1 tablet (40 mg total) by mouth once daily.  Qty: 90 tablet, Refills: 2    Associated Diagnoses: Essential hypertension      gabapentin (NEURONTIN) 300 MG capsule TAKE 1 CAPSULE BY MOUTH AT BEDTIME FOR DIABETIC NEUROPATHY  Qty: 90 capsule, Refills: 3    Associated Diagnoses: Diabetic polyneuropathy associated with diabetes mellitus due to underlying condition      insulin glargine (LANTUS SOLOSTAR) 100 unit/mL (3 mL) InPn pen 25 units SQ in AM for Diabetes  Qty: 15 mL, Refills: 0    Associated Diagnoses: Controlled type 2 diabetes mellitus with stage 4 chronic kidney disease, with long-term current use of insulin      lancets (ACCU-CHEK SOFTCLIX LANCETS) Misc 1 strip by  Misc.(Non-Drug; Combo Route) route 2 (two) times daily.  Qty: 180 each, Refills: 3    Associated Diagnoses: DM (diabetes mellitus)      losartan (COZAAR) 100 MG tablet Take 1 tablet (100 mg total) by mouth once daily.  Qty: 90 tablet, Refills: 3      oxyCODONE (OXYCONTIN) 30 mg TR12 12 hr tablet Take 1 tablet (30 mg total) by mouth every 12 (twelve) hours.  Qty: 90 tablet, Refills: 0    Associated Diagnoses: Degeneration of lumbar or lumbosacral intervertebral disc      tamsulosin (FLOMAX) 0.4 mg Cp24 Take 1 capsule (0.4 mg total) by mouth once daily. 1 Capsule, Sust. Release 24 hr Oral Every day.  for prostate  Qty: 90 capsule, Refills: 3    Associated Diagnoses: Benign non-nodular prostatic hyperplasia without lower urinary tract symptoms      walker Misc 1 Rolling Walker with a Seat  Qty: 1 each, Refills: 0    Associated Diagnoses: Degeneration of lumbar or lumbosacral intervertebral disc      blood sugar diagnostic Strp Check glucose 2-3x/day before meals and/or bedtime  Qty: 300 strip, Refills: 3    Associated Diagnoses: Diabetes mellitus without complication      calcium-vitamin D 600 mg(1,500mg) -400 unit Tab 1 tab daily  Qty: 30 tablet, Refills: prn    Associated Diagnoses: Chronic renal impairment, stage 4 (severe)      ferrous sulfate dried (SLOW FE) 160 mg (50 mg iron) TbSR Take 1 tablet (160 mg total) by mouth once daily.  Qty: 30 tablet, Refills: 3    Associated Diagnoses: Iron deficiency      multivitamin (THERAGRAN) per tablet Take 1 tablet by mouth once daily.      timolol maleate 0.5% (TIMOPTIC) 0.5 % Drop Place 1 drop into both eyes once daily.       TRAVATAN Z 0.004 % Drop Place 1 drop into both eyes every evening.          STOP taking these medications       gentamicin (GARAMYCIN) 0.3 % ophthalmic solution Comments:   Reason for Stopping:               Indwelling Lines/Drains at time of discharge:   Lines/Drains/Airways     Pressure Ulcer                 Pressure Ulcer 12/27/17 5395 sacral spine  suspected deep tissue injury 1 day                Time spent on the discharge of patient: 35 minutes  Patient was seen and examined on the date of discharge and determined to be suitable for discharge.         Anton Wolff MD  Department of Hospital Medicine  [facility]

## 2017-12-29 NOTE — PT/OT/SLP PROGRESS
Occupational Therapy   Treatment and D/c     Name: Joseph Valerio  MRN: [id number]  Admitting Diagnosis:  Pneumonia of right lower lobe due to infectious organism       Recommendations:     Discharge Recommendations: nursing facility, skilled  Discharge Equipment Recommendations:   (per SNF)  Barriers to discharge:  Decreased caregiver support, Inaccessible home environment    Subjective     Communicated with: nsg prior to session.  Pt reporting fatigue this date   Pain/Comfort:  · Pain Rating 1: 0/10    Objective:     Patient found with:      General Precautions: Standard, fall   Orthopedic Precautions:N/A   Braces:       Occupational Performance:    Bed Mobility:    · Patient completed Supine to Sit with moderate assistance     Functional Mobility/Transfers: 5 trials standing from seated position throughout session (EOB and b/s chair)  · Patient completed Sit <> Stand Transfer with minimum assistance  with  rolling walker   · Patient completed Bed <> Chair Transfer using Step Transfer technique with minimum assistance with rolling walker    Activities of Daily Living:  · Grooming: contact guard assistance at sink; standing ~ 5 min prior to seated rest break   · UB Dressing: stand by assistance assist to gather clothing from suitcase   · LB Dressing: moderate assistance jacquie pants; assist with threading BLEs and CGA in stance to reach and pull pants over buttcocks    Patient left up in chair with all lines intact, call button in reach, nsg notified and DIL  present    AMPA 6 Click:  AMPAC Total Score: 18    Treatment & Education:  Pt performing skills as listed above; functional mobility in room performed with RW for participation in ADLs ~ 25 feet total during sessions. Seated rest breaks required between axs   Education:    Assessment:     Joseph Valerio is a 82 y.o. male with a medical diagnosis of Pneumonia of right lower lobe due to infectious organism.  He presents with deconditioning.  Performance  deficits affecting function are weakness, gait instability, impaired balance, impaired endurance, impaired self care skills, impaired functional mobilty, impaired skin, impaired cognition, edema, decreased safety awareness.  Pt has progressed well towards goals. To d/c to SNF this date to continue to work towards maximizing functional independence prior to returning home. Will d/c OT     Rehab Prognosis:  Good ; patient would benefit from acute skilled OT services to address these deficits and reach maximum level of function.       Plan:     Patient to be seen  (d/c from hospital this date ) to address the above listed problems via self-care/home management, therapeutic activities, therapeutic exercises  · Plan of Care Expires: 12/29/17  · Plan of Care Reviewed with: patient, family    This Plan of care has been discussed with the patient who was involved in its development and understands and is in agreement with the identified goals and treatment plan    GOALS:    Occupational Therapy Goals        Problem: Occupational Therapy Goal    Goal Priority Disciplines Outcome Interventions   Occupational Therapy Goal     OT, PT/OT Unable to achieve outcome(s) by discharge    Description:  Goals to be met by: 1/27/18     Patient will increase functional independence with ADLs by performing:    LE Dressing with Minimal Assistance.  Grooming while standing with Contact Guard Assistance.  Toileting from toilet with Minimal Assistance for hygiene and clothing management.   Supine to sit with CG Assistance.  Stand pivot transfers with Contact Guard Assistance.  Toilet transfer to toilet with Contact Guard Assistance.  Increased functional strength to WFL for self care skills and functional mobility.  Upper extremity exercise program x10 reps per handout, with assist as needed .                      Time Tracking:     OT Date of Treatment: 12/29/17  OT Start Time: 1349  OT Stop Time: 1413  OT Total Time (min): 24 min    Billable  Minutes:Self Care/Home Management 24    Majo Duval OT  12/29/2017

## 2017-12-29 NOTE — PROGRESS NOTES
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  VILMA beebe lives alone in [place], [place], and his son, Joseph Valerio Jr ([phone]) checks on him frequently.  He is retired and used to work as a  and an  in [employer].  He was exposed to asbestos.  He smoked 3.5 packs per day around 30 years and quit in 1990.   He denies use of alcohol or illicit drugs.  His primary care physician is Dr. Thompson Prince. His last clinic visit was on 12/20/2017 and Dr. Prince ordered a CT chest scan: "Mild persistent peribronchial tree in bud nodularity, most pronounced in the right upper and lower lobes. Findings could reflect atypical infectious etiology, inflammation, or sequela of aspiration."  He's had previous short hospitalizations for pneumonia and saw Pulmonology, Dr. Ho, on 7/28/2017. He ordered repeat CT scan in September 2017 (not done) and PFTs at next visit (1/23/18).               He was hospitalized at [facility] from 11/6/17-11/8/17 for rhabdomyolysis.  He was discharged home with home health.              He was taken to [facility] Emergency Department the morning of 12/26/17 with hypoxia and lethargy.  His oxygen saturation was 85% per EMS, who placed him on CPAP, which improved his " hypoxia.  His son reported that he was disoriented for the past 2 days and did not recognize him the night before.  He had a productive cough with thick white sputum.  In the ED, he was hypertensive with blood pressure of 204/90.  He was placed on BiPAP.  WBC count was 14,590.  Troponin was 0.084 and BNP was 365, but his BNP has been similarly high in the past.  Chest x-ray showed a right costophrenic angle and right lung base opacity.  He was given cefepime and vancomycin.  He was weaned to room air and blood pressures decreased before admission to [facility].  His son reported that Mr. Valerio's cognition had been waxing and waning for over 2 months, and believed that he needed placement in a nursing facility at least temporarily because he was unable to take full-time care of him at this time.     Hospital Course:  He remained on room air.  WBC count was normal the next morning.  Renal function was improving.  Troponin increased and Cardiology felt it was just demand ischemia from being in respiratory distress.  Case management began working on skilled nursing facility placement.  Amoxicillin-clavulanate was given for his pneumonia.  He remained stable throughout the hospital course.    Interval History: No acute events.    Review of Systems   Constitutional: Negative for chills and fever.   Gastrointestinal: Negative for abdominal pain, nausea and vomiting.     Objective:     Vital Signs (Most Recent):  Temp: 98.9 °F (37.2 °C) (12/29/17 1149)  Pulse: 63 (12/29/17 1151)  Resp: 18 (12/29/17 1151)  BP: (!) 155/77 (12/29/17 1149)  SpO2: 97 % (12/29/17 1151) Vital Signs (24h Range):  Temp:  [98.1 °F (36.7 °C)-99 °F (37.2 °C)] 98.9 °F (37.2 °C)  Pulse:  [56-72] 63  Resp:  [16-20] 18  SpO2:  [94 %-98 %] 97 %  BP: (148-192)/(68-83) 155/77     Weight: 105.1 kg (231 lb 11.3 oz)  Body mass index is 33.25 kg/m².    Intake/Output Summary (Last 24 hours) at 12/29/17 1210  Last data filed at 12/29/17 0743    Gross per 24 hour   Intake              305 ml   Output             1150 ml   Net             -845 ml      Physical Exam   Constitutional: He is oriented to person, place, and time. No distress.   Pulmonary/Chest: Effort normal. No respiratory distress.   Neurological: He is alert and oriented to person, place, and time.   Psychiatric: He has a normal mood and affect. Thought content normal.   Nursing note and vitals reviewed.    Significant Labs:   BMP:     Recent Labs  Lab 12/28/17  0800   *      K 3.8      CO2 24   BUN 44*   CREATININE 2.3*   CALCIUM 8.1*   MG 1.9     CBC:     Recent Labs  Lab 12/28/17  0800 12/29/17  1122   WBC 6.31 6.92   HGB 8.5* 8.8*   HCT 27.9* 28.4*    231     POCT Glucose:     Recent Labs  Lab 12/28/17  2058 12/29/17  0536 12/29/17  1149   POCTGLUCOSE 184* 201* 157*       Significant Imaging: None new    Assessment/Plan:      * Pneumonia of right lower lobe due to infectious organism    Continue amoxicillin-clavulanate course.  Has scheduled follow-up with Pulmonology in January.          Biatrial enlargement    Prescribed furosemide 40 mg daily, carvedilol 20 mg daily, and losartan 100 mg daily. Holding furosemide and losartan for JOHN PAUL.            Persistent atrial fibrillation    Continue home apixaban 2.5 mg BID which was started on 7/10/17 by Cardiology at [facility].           Coronary artery disease involving native coronary artery of native heart without angina pectoris    Not on antiplatelet therapy at home.  Started aspirin 81 mg daily.          Obstructive sleep apnea on CPAP    O2 sat > 92% on room air. Pt states does not use CPAP at home regularly. Did not tolerate BiPap earlier today so will hold for now.  Supplemental oxygen as needed to keep sats > 92%.           Benign prostatic hyperplasia    Continue home tamsulosin 0.4 mg daily. No  complaints.           Essential hypertension    Takes carvedilol CR 20 mg 24 hour capsule once daily,  losartan 100 mg daily.  Give carvedilol 12.5 mg BID.  Continue hold on losartan for JOHN PAUL.  Monitor.           Type 2 diabetes mellitus with stage 3 chronic kidney disease, with long-term current use of insulin    Check blood glucose AC and HS and use SSI.  A1c 5.6% on 12/13/17.  Take insulin glargine 25 units daily at home.  Holding due to low A1c.             VTE Risk Mitigation         Ordered     apixaban tablet 2.5 mg  2 times daily     Route:  Oral        12/26/17 1956     Medium Risk of VTE  Once      12/26/17 1551     Reason for No Pharmacological VTE Prophylaxis  Once      12/26/17 1551        Disposition: Awaiting SNF.      Anton Wolff MD  Department of Hospital Medicine   [facility]

## 2017-12-29 NOTE — PT/OT/SLP PROGRESS
Physical Therapy Treatment    Patient Name:  Joseph Valerio   MRN:  [id number]    Recommendations:     Discharge Recommendations:  nursing facility, skilled   Discharge Equipment Recommendations:  (defer to SNF)   Barriers to discharge: Decreased caregiver support    Assessment:     Joseph Valerio is a 82 y.o. male admitted with a medical diagnosis of Pneumonia of right lower lobe due to infectious organism.  He presents with the following impairments/functional limitations:  weakness, impaired endurance, impaired functional mobilty, gait instability, impaired balance, decreased safety awareness, pain, impaired coordination improving mobility and strength noted,will benefit from SNF upon discharge.    Rehab Prognosis:  Good; patient would benefit from acute skilled PT services to address these deficits and reach maximum level of function.      Recent Surgery: * No surgery found *      Plan:     During this hospitalization, patient to be seen 6 x/week to address the above listed problems via gait training, therapeutic activities, therapeutic exercises  · Plan of Care Expires:  01/27/18   Plan of Care Reviewed with: patient    Subjective     Communicated with nsg prior to session.  Patient found supine upon PT entry to room, agreeable to treatment.      Chief Complaint: chronic back pain  Patient comments/goals: to get better and go home  Pain/Comfort:  · Pain Rating 1:  (no rating)  · Location - Orientation 1: generalized  · Location 1: back  · Pain Addressed 1: Reposition, Distraction    Patients cultural, spiritual, Yazidism conflicts given the current situation:      Objective:     Patient found with:       General Precautions: Standard, fall   Orthopedic Precautions:N/A   Braces: N/A     Functional Mobility:  · Bed Mobility:     · Supine to Sit: minimum assistance  · Transfers:     · Sit to Stand:  minimum assistance with rolling walker  · Bed to Chair: minimum assistance with  rolling walker  using   ambulation  · Gait: amb ~14' X 2 with RW and Min A  · Balance: fair sitting balance      AM-PAC 6 CLICK MOBILITY  Turning over in bed (including adjusting bedclothes, sheets and blankets)?: 3  Sitting down on and standing up from a chair with arms (e.g., wheelchair, bedside commode, etc.): 3  Moving from lying on back to sitting on the side of the bed?: 3  Moving to and from a bed to a chair (including a wheelchair)?: 3  Need to walk in hospital room?: 3  Climbing 3-5 steps with a railing?: 2  Total Score: 17       Therapeutic Activities and Exercises: assisted pt to bathroom with RW,sat ~13 mins then positioned in chair,nsg notified       Patient left up in chair with all lines intact, call button in reach and nsg notified..    GOALS: see general POC   Physical Therapy Goals        Problem: Physical Therapy Goal    Goal Priority Disciplines Outcome Goal Variances Interventions   Physical Therapy Goal     PT/OT, PT Ongoing (interventions implemented as appropriate)     Description:  Goals to be met by: 2018     Patient will increase functional independence with mobility by performin. Supine to sit with MInimal Assistance  2. Sit to stand transfer with Minimal Assistance  3. Gait  x 75 feet with Minimal Assistance using Rolling Walker.                       Time Tracking:     PT Received On: 17  PT Start Time: 0845     PT Stop Time: 09  PT Total Time (min): 26 min     Billable Minutes: Gait Training 14 and Therapeutic Activity 12    Treatment Type: Treatment  PT/PTA: PTA     PTA Visit Number: 1     Hayes Shaikh, PTA  2017

## 2017-12-29 NOTE — PLAN OF CARE
TN spoke with Stella at [facility]. She stated patient's labs look good, and therapy notes in. They will admit patient today. TN informed Dr. Wolff, who write write orders. TN notified patient. Also, called son and left message for call back.    Ninfa Fitzgerald RN  Transition Navigator  [phone]

## 2017-12-30 PROBLEM — I15.0 RENOVASCULAR HYPERTENSION: Status: ACTIVE | Noted: 2017-12-30

## 2017-12-30 PROBLEM — S41.102A ARM WOUND, LEFT, INITIAL ENCOUNTER: Status: ACTIVE | Noted: 2017-12-30

## 2017-12-30 LAB
POCT GLUCOSE: 162 MG/DL (ref 70–110)
POCT GLUCOSE: 170 MG/DL (ref 70–110)
POCT GLUCOSE: 183 MG/DL (ref 70–110)
POCT GLUCOSE: 209 MG/DL (ref 70–110)

## 2017-12-30 PROCEDURE — 12000000 HC SNF SEMI-PRIVATE ROOM

## 2017-12-30 PROCEDURE — 97802 MEDICAL NUTRITION INDIV IN: CPT

## 2017-12-30 PROCEDURE — 97110 THERAPEUTIC EXERCISES: CPT

## 2017-12-30 PROCEDURE — 97161 PT EVAL LOW COMPLEX 20 MIN: CPT

## 2017-12-30 PROCEDURE — 99306 1ST NF CARE HIGH MDM 50: CPT | Mod: AI,,, | Performed by: HOSPITALIST

## 2017-12-30 PROCEDURE — 97535 SELF CARE MNGMENT TRAINING: CPT

## 2017-12-30 PROCEDURE — 97116 GAIT TRAINING THERAPY: CPT

## 2017-12-30 PROCEDURE — 25000003 PHARM REV CODE 250: Performed by: HOSPITALIST

## 2017-12-30 PROCEDURE — 97165 OT EVAL LOW COMPLEX 30 MIN: CPT

## 2017-12-30 PROCEDURE — 63600175 PHARM REV CODE 636 W HCPCS: Performed by: HOSPITALIST

## 2017-12-30 RX ORDER — IBUPROFEN 200 MG
16 TABLET ORAL
Status: DISCONTINUED | OUTPATIENT
Start: 2017-12-30 | End: 2018-01-11 | Stop reason: HOSPADM

## 2017-12-30 RX ORDER — OXYCODONE HYDROCHLORIDE 5 MG/1
5 TABLET ORAL EVERY 6 HOURS PRN
Status: DISCONTINUED | OUTPATIENT
Start: 2017-12-30 | End: 2018-01-11 | Stop reason: HOSPADM

## 2017-12-30 RX ORDER — GLUCAGON 1 MG
1 KIT INJECTION
Status: DISCONTINUED | OUTPATIENT
Start: 2017-12-30 | End: 2018-01-11 | Stop reason: HOSPADM

## 2017-12-30 RX ORDER — ACETAMINOPHEN 325 MG/1
650 TABLET ORAL EVERY 6 HOURS PRN
Status: DISCONTINUED | OUTPATIENT
Start: 2017-12-30 | End: 2018-01-11 | Stop reason: HOSPADM

## 2017-12-30 RX ORDER — IBUPROFEN 200 MG
24 TABLET ORAL
Status: DISCONTINUED | OUTPATIENT
Start: 2017-12-30 | End: 2018-01-11 | Stop reason: HOSPADM

## 2017-12-30 RX ORDER — INSULIN ASPART 100 [IU]/ML
0-5 INJECTION, SOLUTION INTRAVENOUS; SUBCUTANEOUS
Status: DISCONTINUED | OUTPATIENT
Start: 2017-12-30 | End: 2018-01-11 | Stop reason: HOSPADM

## 2017-12-30 RX ADMIN — STANDARDIZED SENNA CONCENTRATE AND DOCUSATE SODIUM 1 TABLET: 8.6; 5 TABLET, FILM COATED ORAL at 08:12

## 2017-12-30 RX ADMIN — AMOXICILLIN AND CLAVULANATE POTASSIUM 500 MG: 500; 125 TABLET, FILM COATED ORAL at 08:12

## 2017-12-30 RX ADMIN — GABAPENTIN 300 MG: 300 CAPSULE ORAL at 08:12

## 2017-12-30 RX ADMIN — LOSARTAN POTASSIUM 100 MG: 50 TABLET, FILM COATED ORAL at 08:12

## 2017-12-30 RX ADMIN — APIXABAN 2.5 MG: 2.5 TABLET, FILM COATED ORAL at 08:12

## 2017-12-30 RX ADMIN — TAMSULOSIN HYDROCHLORIDE 0.4 MG: 0.4 CAPSULE ORAL at 08:12

## 2017-12-30 RX ADMIN — CARVEDILOL 12.5 MG: 12.5 TABLET, FILM COATED ORAL at 08:12

## 2017-12-30 RX ADMIN — INSULIN ASPART 1 UNITS: 100 INJECTION, SOLUTION INTRAVENOUS; SUBCUTANEOUS at 08:12

## 2017-12-30 RX ADMIN — RAMELTEON 8 MG: 8 TABLET, FILM COATED ORAL at 08:12

## 2017-12-30 RX ADMIN — ASPIRIN 81 MG: 81 TABLET, COATED ORAL at 08:12

## 2017-12-30 RX ADMIN — FUROSEMIDE 40 MG: 40 TABLET ORAL at 08:12

## 2017-12-30 NOTE — PT/OT/SLP EVAL
PhysicalTherapy   Evaluation    oJseph Valerio   MRN: [id number]     PT Received On: 12/30/17  PT Start Time: 1124     PT Stop Time: 1214    PT Total Time (min): 50 min       Billable Minutes:  Evaluation 10, Gait Training 15, Therapeutic Exercise 25 and Total Time 40    Diagnosis: Pneumonia of right lower lobe due to infectious organism  Past Medical History:   Diagnosis Date    Allergy     Aortic sclerosis     BPH (benign prostatic hypertrophy)     CAD (coronary artery disease) 7/31/2012    Calcium nephrolithiasis     Chronic allergic rhinitis 5/28/2013    Chronic anticoagulation - apixaban  11/7/2017    For AFib    Chronic atrial fibrillation 10/16/2014    CKD (chronic kidney disease), stage III 10/16/2014    Colon polyps     Coronary artery disease     CPAP (continuous positive airway pressure) dependence     Degenerative disc disease     Encephalopathy, toxic 6/19/2017    Erectile dysfunction 8/20/2012    Hiatal hernia     HLD (hyperlipidemia) 7/31/2012    Hyperprolactinemia 12/29/2014    Hypertension     Increased prolactin level 9/17/2012    39.9 (3-15 range)     Lumbar disc disease 7/31/2012    Male hypogonadism 9/12/2012    Obesity 7/31/2012    S/p Gastric Bypass Surgery     Peripheral vascular disease     Pituitary microadenoma 2/26/2013    Pneumonia, community acquired 6/18/2017    Proteinuria     Pulmonary hypertension 12/26/2017    Renal insufficiency     Renovascular hypertension 7/31/2012    Severe sepsis 6/19/2017    Sleep apnea     Traumatic rhabdomyolysis 11/7/2017    Type 2 diabetes mellitus with stage 3 chronic kidney disease, with long-term current use of insulin 7/31/2012    Venous insufficiency       Past Surgical History:   Procedure Laterality Date    CATARACT EXTRACTION, BILATERAL      CHOLECYSTECTOMY      CYSTOLITHOTOMY  09/23/2005    GASTRIC BYPASS  03/28/2007    Jg-en-[id number]    RADIOFREQUENCY ABLATION  10/18/2005    right subeustachian isthums ablation  for atrial flutter         General Precautions: Standard, fall  Orthopedic Precautions: N/A   Braces: N/A    Do you have any cultural, spiritual, Denominational conflicts, given your current situation?: none reported    Patient History:  Lives With: alone  Living Arrangements: house  Home Accessibility: stairs to enter home  Home Layout: Able to live on 1st floor  Number of Stairs to Enter Home: 1  Stair Railings at Home: none  Transportation Available: family or friend will provide  Living Environment Comment: Pt lives alone in a 1SH w/ 1STE and a tub/shower combo w/ a grab bar. Pt will be alone most of the time but reports that his 2 sons and neighbor can check on him occasionally.   Equipment Currently Used at Home: grab bar, walker, rolling, cane, straight  DME owned (not currently used): none    Previous Level of Function: PTA pt was [place] using either RW or SPC occasionally for mobility. He reports 1 fall that occurred ~6 months ago due to LOB. He denies dizziness. He is retired. He drives.  Ambulation Skills: needs device  Transfer Skills: independent  ADL Skills: independent    Subjective:  Communicated with pt prior to session.  Pt agreeable to session.  Chief Complaint: weakness  Patient goals: to return home    Pain/Comfort  Pain Rating 1: 0/10    Objective:  Patient found sitting in w/c      Cognitive Exam:  Oriented to: Person, Place, Time and Situation  Follows Commands/attention: Follows two-step commands  Communication: clear/fluent  Safety awareness/insight to disability: intact    Physical Exam:  Postural examination/scapula alignment:    -       Rounded shoulders    Skin integrity: Visible skin intact and Dry  Edema: Moderate feet    Sensation:   -       Intact    Upper Extremity Range of Motion:  Right Upper Extremity: WFL  Left Upper Extremity: WFL    Upper Extremity Strength:  Right Upper Extremity: WFL  Left Upper Extremity: WFL    Lower Extremity Range of Motion:  Right Lower Extremity: WFL  Left  Lower Extremity: WFL    Lower Extremity Strength:  Right Lower Extremity: WFL except HF 2+/5, ankle 4/5  Left Lower Extremity: WFL except HE 3+/5, KF 4/5    Functional Status:  MDS G  Bed Mobility Functional Status: S-SBA  Transfer Functional Status: CGA-Min (A)  Walk in Room Functional Status: CGA-Min (A)  Walk in Corridor Functional Status: CGA-Min (A)  Locomotion on Unit Functional Status: total(A)  Eval Only: Number of L/E limb <4/5 MMT: 0  Moving from seated to standing position: Not steady, only able to stabilize with staff assistance  Walking (with assistive device if used): Not steady, only able to stabilize with staff assistance  Turning around and facing the opposite direction while walking: Not steady, only able to stabilize with staff assistance  Moving on and off the toilet: Not steady, only able to stabilize with staff assistance  Surface-to-surface transfer (transfer between bed and chair or wheelchair): Not steady, only able to stabilize with staff assistance    Timed Up & Go Test (TUG)  Instructions to the patient:   From sitting in a chair, stand up, walk 3 meters, turn around, walk back, and sit down  Scoring:   Assistive Device and/or Bracing Used: Rolling Walker  TUG Time: 32.19 seconds   Community Dwelling Older Adult = > 13.5 sec. are associated with high fall risk     Bed Mobility:  Sit>Supine:on mat w/ SBA  Supine>Sit: on mat w/ SBA  Cueing for technique, inc'd time required    Transfers:  Sit<>Stand: to/from w/c (2 trials) w/ RW and CGA for safety  Stand Pivot Transfer: w/c<>EOM w/ RW and CGA for safety  Cueing for hand placement    Gait:  Amb 64ft w/ RW and CGA for safety, cueing to remain inside RW and for upright posture/forward gaze     Therex:  Supine and seated BLE therex 2x10 reps (GS, SAQ, AP, HF, LAQ)    Balance:  Static stand w/ RW and CGA for safety    Additional Treatment:  Seated UBE x15min to inc BUE strength and endurance    Patient left up in chair with call button in  reach.    Assessment:  Joseph Valerio is a 82 y.o. male with a medical diagnosis of Pneumonia of right lower lobe due to infectious organism.  Pt presents w/ previous pertinent co-morbidities and personal factors including CAD, stage 3 CKD, PVD, and limited assistance available upon D/C. Pt currently presents w/ the below mentioned deficits requiring CGA for safety w/ transfers and ambulation. Pt's clinical presentation is simple and uncomplicated. According to the TUG pt is a high fall risk. These factors classify pt as a LOW complexity evaluation. Pt is appropriate for skilled PT and will begin PT POC.    Rehab identified problem list/impairments: weakness, impaired endurance, impaired self care skills, impaired functional mobilty, gait instability, impaired balance    Rehab potential is good.    Activity tolerance: Good    Discharge recommendations: home with home health     Barriers to discharge: Decreased caregiver support    Equipment recommendations: bedside commode, bath bench     GOALS:    Physical Therapy Goals        Problem: Physical Therapy Goal    Goal Priority Disciplines Outcome Goal Variances Interventions   Physical Therapy Goal     PT/OT, PT Ongoing (interventions implemented as appropriate)     Description:  Goals to be met by: 14 days     Patient will increase functional independence with mobility by performin. Supine to sit with Modified [place]  2. Sit to supine with Modified [place]  3. Sit to stand transfer with Supervision  4. Bed to chair transfer with Supervision using Rolling Walker  5. Gait  x 150 feet with Supervision using Rolling Walker.   6. Ascend/Descend 4 inch curb step with Stand-by Assistance using Rolling Walker.  7. Stand for 3 minutes with Stand-by Assistance using Rolling Walker while performing dynamic standing activity.  8. Lower extremity exercise program x20 reps per handout, with assistance as needed  9. Pt will improve TUG time to 20s or less in  order to decrease fall risk                      PLAN:    Patient to be seen  (5-6X/week)  to address the above listed problems via gait training, therapeutic activities, therapeutic exercises  Plan of Care Expires: 01/29/18    Jo-Ann Pelayo, PT 12/30/2017

## 2017-12-30 NOTE — PT/OT/SLP EVAL
Occupational Therapy  Evaluation    Joseph Valerio   MRN: [id number]   Admitting Diagnosis: Pneumonia of right lower lobe due to infectious organism     OT Date of Treatment: 12/30/17   OT Start Time: 0900  OT Stop Time: 1024  OT Total Time (min): 84 min    Billable Minutes:  Evaluation 30  Self Care/Home Management 54    Diagnosis: Pneumonia of right lower lobe due to infectious organism    Past Medical History:   Diagnosis Date    Allergy     Aortic sclerosis     BPH (benign prostatic hypertrophy)     CAD (coronary artery disease) 7/31/2012    Calcium nephrolithiasis     Chronic allergic rhinitis 5/28/2013    Chronic anticoagulation - apixaban  11/7/2017    For AFib    Chronic atrial fibrillation 10/16/2014    CKD (chronic kidney disease), stage III 10/16/2014    Colon polyps     Coronary artery disease     CPAP (continuous positive airway pressure) dependence     Degenerative disc disease     Encephalopathy, toxic 6/19/2017    Erectile dysfunction 8/20/2012    Hiatal hernia     HLD (hyperlipidemia) 7/31/2012    Hyperprolactinemia 12/29/2014    Hypertension     Increased prolactin level 9/17/2012    39.9 (3-15 range)     Lumbar disc disease 7/31/2012    Male hypogonadism 9/12/2012    Obesity 7/31/2012    S/p Gastric Bypass Surgery     Peripheral vascular disease     Pituitary microadenoma 2/26/2013    Pneumonia, community acquired 6/18/2017    Proteinuria     Pulmonary hypertension 12/26/2017    Renal insufficiency     Renovascular hypertension 7/31/2012    Severe sepsis 6/19/2017    Sleep apnea     Traumatic rhabdomyolysis 11/7/2017    Type 2 diabetes mellitus with stage 3 chronic kidney disease, with long-term current use of insulin 7/31/2012    Venous insufficiency       Past Surgical History:   Procedure Laterality Date    CATARACT EXTRACTION, BILATERAL      CHOLECYSTECTOMY      CYSTOLITHOTOMY  09/23/2005    GASTRIC BYPASS  03/28/2007    [id number]    RADIOFREQUENCY  "ABLATION  10/18/2005    right subeustachian isthums ablation for atrial flutter         General Precautions: Standard, fall ([place])  Orthopedic Precautions:    Braces:      Do you have any cultural, spiritual, Spiritism conflicts, given your current situation?: no     Patient History:  Lives With: alone  Living Arrangements: house  Home Accessibility: stairs to enter home  Home Layout: Able to live on 1st floor  Number of Stairs to Enter Home: 1  Stair Railings at Home: none  Transportation Available: family or friend will provide  Living Environment Comment: Patient lives alone in a single story home with tub/shower comobo with grab bar, owns a FWW and a cane   Equipment Currently Used at Home: cane, straight, grab bar, walker, rolling    Prior level of function:   Bed Mobility/Transfers: independent  Grooming: independent  Bathing: independent  Upper Body Dressing: independent  Lower Body Dressing: independent  Toileting: independent  Home Management Skills: independent  Homemaking Responsibilities: Yes  Driving License: Yes  Mode of Transportation: Car  Occupation: Retired  Leisure and Hobbies: visiting and having coffee with friends, odd jobs around the house  IADL Comments: Patient lives alone and completes all IADL tasks himself however has two sons that assist as needed.     Dominant hand: right    Subjective:  Communicated with patient prior to session.  "I'f like to get out of this bed."  Chief Complaint: Weakness  Patient/Family stated goals: "To go home and do everything myself."    Pain/Comfort  Pain Rating 1: 0/10  Pain Rating Post-Intervention 1: 0/10    Objective:    Presents in bed supine with HOB elevated    Cognitive Exam:  Oriented to: Person, Place, Time and Situation  Follows Commands/attention: Follows two-step commands  Communication: clear/fluent  Memory:  No Deficits noted  Safety awareness/insight to disability: mildly impaired  Coping skills/emotional control: Appropriate to " situation    Visual/perceptual:  Intact    Physical Exam:  Postural examination/scapula alignment:    -       Rounded shoulders  -       Forward head  -       Posterior pelvic tilt  Skin integrity: Wound buttocks and right posterior shoulder and B ankle and lower leg red and thin   Edema: Moderate BLE including feet    Sensation:      -       Impaired  numbness and tingling BLE    Upper Extremity Range of Motion:  Right Upper Extremity: WFL  Left Upper Extremity: WFL    Upper Extremity Strength:  Right Upper Extremity: WFL  Left Upper Extremity: WFL   Strength: good bilaterally    Fine motor coordination:      -       Intact    Gross motor coordination: WFL    Functional Status:  MDS G  Bed Mobility Functional Status: S-SBA  Transfer Functional Status: CGA-Min (A) with FWW  Walk in Room Functional Status: CGA-Min (A) with FWW  Dressing Functional Status: 3:mod(A) seated in WC  Eating Functional Status: mod(I) - (I)  Toilet Use Functional Status: mod(A) standard toilet; assist for thorough hygiene   Personal Hygiene Functional Status: CGA-Min (A)  Bathing Functional Status: mod(A) seated at sink for sponge bath  Moving from seated to standing position: Not steady, only able to stabilize with staff assistance  Walking (with assistive device if used): Not steady, only able to stabilize with staff assistance  Turning around and facing the opposite direction while walking: Not steady, only able to stabilize with staff assistance  Moving on and off the toilet: Not steady, only able to stabilize with staff assistance  Surface-to-surface transfer (transfer between bed and chair or wheelchair): Not steady, only able to stabilize with staff assistance     Treatment:  Education and training provided for safety, energy conservation, work simplification, fall prevention and pressure sore reduction during dressing, grooming, bathing and functional mobility.    Patient left up in chair with call button in  reach    Assessment:  Joseph Valerio is a 82 y.o. male with a medical diagnosis of Pneumonia of right lower lobe due to infectious organism. Primarily limited by SOB, weakness and endurance.  Requires extra time for all tasks and cues for pacing. Uses C-Pap at home secondary to sleep apnea. Will benefit from OT intervention to focus on safety, fall prevention, energy conservation and standing balance and tolerance in order to safely participate in meaningful self-care and functional mobility activities at highest level of independence.       Pt presented with a minimal complexity OT evaluation. Pt required an extensive review of medical history and occupational profile. Pt demod 3+ performance deficits (physical, cognitive, or psychosocial) resulting in limitations and engagement restrictions. Clinical decision making required analytical complexity with minimal treatment options. Pt with cormorbidities and required minimal modification of task/assistance with assessment.       Physical- skills refer to impairments of body structure or functions, balance, mobility; strength, endurance, FMC, GMC, sensation, dexterity, and posture.  Cognitive- skills refer to ability to attend, communicate, perceive, think, understand, problem solve, mentally sequence, learn, and remember resulting in ability to organize occupational performance in timely and safe manner.   Psychosocial- skills refer to interpersonal interactions, habits, routines, and behaviors, active use of coping strategies, and environmental adaptations to appropriately participate in everyday tasks and situations.        Rehab identified problem list/impairments: weakness, impaired endurance, impaired self care skills, impaired functional mobilty, gait instability, impaired balance, decreased safety awareness, edema    Rehab potential is good    Activity tolerance: Poor    Discharge recommendations: home with home health, home health OT     Barriers to  discharge: Decreased caregiver support     Equipment recommendations: bedside commode, shower chair     GOALS:    Occupational Therapy Goals        Problem: Occupational Therapy Goal    Goal Priority Disciplines Outcome Interventions   Occupational Therapy Goal     OT, PT/OT Ongoing (interventions implemented as appropriate)    Description:  Goals to be met by: 14 days    Patient will increase functional independence with ADLs by performing:    UE Dressing with [place].  LE Dressing with Modified [place].  Grooming while standing with Modified [place].  Toileting from toilet with Modified [place] for hygiene and clothing management.   Bathing from  shower chair/bench with Modified [place].  Toilet transfer to toilet with Modified [place].  Standing tolerance 10 minutes to complete standing ADL and IADL tasks.                      PLAN: Patient to be seen   to address the above listed problems via    Plan of Care expires:    Plan of Care reviewed with: patient    MILLER Martinez  12/30/2017

## 2017-12-30 NOTE — NURSING
BS earlier was 217 re- checked went down to 187. No orders for Blood sugar monitoring but endorsed by AM charge to checked and no sliding scale order for insulin. Charge nurse made aware.

## 2017-12-30 NOTE — CONSULTS
[facility]  Adult Nutrition  Consult Note    SUMMARY     Recommendations    Recommendation/Intervention: 1.Continue current diet. 2. RD to f/u with interview and assess education needs. 3. RD to monitor.   Goals: Adherence to hospital Rx diet  Nutrition Goal Status: new  Communication of RD Recs:  (POC, sticky note)      Reason for Assessment    Reason for Assessment: physician consult   diagnosis:  1. Debility      Past Medical History:   Diagnosis Date    Allergy     Aortic sclerosis     BPH (benign prostatic hypertrophy)     CAD (coronary artery disease) 7/31/2012    Calcium nephrolithiasis     Chronic allergic rhinitis 5/28/2013    Chronic anticoagulation - apixaban  11/7/2017    For AFib    Chronic atrial fibrillation 10/16/2014    CKD (chronic kidney disease), stage III 10/16/2014    Colon polyps     Coronary artery disease     CPAP (continuous positive airway pressure) dependence     Degenerative disc disease     Encephalopathy, toxic 6/19/2017    Erectile dysfunction 8/20/2012    Hiatal hernia     HLD (hyperlipidemia) 7/31/2012    Hyperprolactinemia 12/29/2014    Hypertension     Increased prolactin level 9/17/2012    39.9 (3-15 range)     Lumbar disc disease 7/31/2012    Male hypogonadism 9/12/2012    Obesity 7/31/2012    S/p Gastric Bypass Surgery     Peripheral vascular disease     Pituitary microadenoma 2/26/2013    Pneumonia, community acquired 6/18/2017    Proteinuria     Pulmonary hypertension 12/26/2017    Renal insufficiency     Renovascular hypertension 7/31/2012    Severe sepsis 6/19/2017    Sleep apnea     Traumatic rhabdomyolysis 11/7/2017    Type 2 diabetes mellitus with stage 3 chronic kidney disease, with long-term current use of insulin 7/31/2012    Venous insufficiency         General Information Comments: Remote coverage for nutrition services today. Unable to reach patient via telephone. Per nsg notes PO intake ~ 100 % yesterday.   "Noted last A1c within normal level (5.6).     Nutrition Discharge Planning: Cardiac Diabetic diet     Nutrition Prescription Ordered    Current Diet Order: Cardiac Diabetic 2000       Evaluation of Received Nutrients/Fluid Intake           Intake/Output Summary (Last 24 hours) at 12/30/17 1001  Last data filed at 12/30/17 0600   Gross per 24 hour   Intake              200 ml   Output              700 ml   Net             -500 ml       Energy Calories Required: meeting needs   Protein Required: meeting needs     % Intake of Estimated Energy Needs: 75 - 100 %  % Meal Intake: 100%     Nutrition Risk Screen     Nutrition Risk Screen: no indicators present    Nutrition/Diet History       Typical Food/Fluid Intake: TODD  Food Preferences: no Mandaeism/cultural food preferences noted         Labs/Tests/Procedures/Meds       Pertinent Labs Reviewed: reviewed, pertinent      BMP  Lab Results   Component Value Date     12/29/2017    K 4.2 12/29/2017     12/29/2017    CO2 25 12/29/2017    BUN 41 (H) 12/29/2017    CREATININE 1.6 (H) 12/29/2017    CALCIUM 8.1 (L) 12/29/2017    ANIONGAP 8 12/29/2017    ESTGFRAFRICA 46 (A) 12/29/2017    EGFRNONAA 40 (A) 12/29/2017     Lab Results   Component Value Date    CALCIUM 8.1 (L) 12/29/2017    PHOS 3.2 12/28/2017     Lab Results   Component Value Date    ALBUMIN 2.2 (L) 12/29/2017       Recent Labs  Lab 12/30/17  0734   POCTGLUCOSE 162*     Lab Results   Component Value Date    HGBA1C 5.6 12/13/2017       Pertinent Medications Reviewed: reviewed       Physical Findings    Overall Physical Appearance: obese (per BMI)     Oral/Mouth Cavity:  (teeth missing)  Skin:  (Manan Score 19, wound skin tear lower arm, pressure ulcer sacral spine suspected deep tissue injury)    Anthropometrics    Temp: 98.3 °F (36.8 °C)     Height: 5' 10" (177.8 cm)  Weight Method: Standard Scale  Weight: 105 kg (231 lb 7.7 oz)  Ideal Body Weight (IBW), Male: 166 lb     % Ideal Body Weight, Male (lb): " 139.45 lb     BMI (Calculated): 33.3  BMI Grade: 30 - 34.9- obesity - grade I       Estimated/Assessed Needs    Weight Used For Calorie Calculations: 105 kg (231 lb 7.7 oz)   Height (cm): 177.8 cm  Energy Calorie Requirements (kcal): 1761- 2113  Energy Need Method: Gratiot-St Jeor (x1- 1.2 )     RMR (Gratiot-St. Jeor Equation): 1761.25     Weight Used For Protein Calculations: 105 kg (231 lb 7.7 oz)     1.0 gm Protein (gm): 105.22 and 1.2 gm Protein (gm): 126.26     Fluid Need Method: RDA Method (or per MD)        RDA Method (mL): 1761         CHO Requirement: 250 g     Assessment and Plan    Type 2 diabetes mellitus with stage 3 chronic kidney disease, with long-term current use of insulin    Nutrition problem  Altered nutrition related laboratory values    Related to (etiology):   DM    Signs and Symptoms (as evidenced by):     Recent Labs  Lab 12/30/17  0734   POCTGLUCOSE 162*       Interventions/Recommendations (treatment strategy):  See above    Nutrition Diagnosis Status:   New              Monitor and Evaluation    Food and Nutrient Intake: energy intake  Food and Nutrient Adminstration: diet order  Knowledge/Beliefs/Attitudes: food and nutrition knowledge/skill  Biochemical Data, Medical Tests and Procedures: electrolyte and renal panel, glucose/endocrine profile  Nutrition-Focused Physical Findings: overall appearance, skin      Nutrition Follow-Up    RD Follow-up?: Yes (2xweekly)

## 2017-12-30 NOTE — H&P
[facility]  Department of Hospital Medicine  History & Physical    Patient Name: Joseph Valerio  MRN: [id number]  Code Status: Full Code  Admission Date: 12/29/2017  Attending Physician: Jaqueline Rodarte MD   Primary Care Provider: Thompson Stiles MD    Subjective:     Principal Problem:Pneumonia of right lower lobe due to infectious organism    No chief complaint on file.       HPI: Chief Complaint/Reason for Admission: Debility    History of Present Illness:  Patient is a 82 y.o. male with PMH of  Aortic sclerosis; BPH; CAD;  Chronic allergic rhinitis; Chronic anticoagulation - apixaban ; Chronic atrial fibrillation; CKD stage III; CPAP dependence; HLD; Hyperprolactinemia; Hypertension; Lumbar disc disease; Male hypogonadism; Obesity; Peripheral vascular disease; Pituitary microadenoma; Pulmonary hypertension; Renovascular hypertension; Sleep apnea; Traumatic rhabdomyolysis; Type 2 diabetes mellitus with stage 3 chronic kidney disease, with long-term current use of insulin; and Venous insufficiency. He was admitted for respiratory distress and altered mental status. Sats on admit were in the 80s. He has a history of tobacco use of 3.5 packs per day x 30 years and had worked as a  with asbestos exposure. He had been followed by his PCP and pulmonary and a CT chest earlier this month showed tree in bud nodularity and inflammation. He was seen by pulmonary in July but did not follow up. During his hospitalization he was treated for RLL PNA with augmentin and had clinical improvement. He had JOHN PAUL on CKD 3 but it resolved back to baseline, and a slight troponemia which cardiology attributed to demand ischemia as he was hypertensive on presentation with SBP in the 240s. He was recently hospitalized in November for rhabdomyolysis and was discharged home after a short stay. Patient has been working with PT/OT who recommend SNF for further balance/mobility training.         Past Medical History:    Diagnosis Date    Allergy     Aortic sclerosis     BPH (benign prostatic hypertrophy)     CAD (coronary artery disease) 7/31/2012    Calcium nephrolithiasis     Chronic allergic rhinitis 5/28/2013    Chronic anticoagulation - apixaban  11/7/2017    For AFib    Chronic atrial fibrillation 10/16/2014    CKD (chronic kidney disease), stage III 10/16/2014    Colon polyps     Coronary artery disease     CPAP (continuous positive airway pressure) dependence     Degenerative disc disease     Encephalopathy, toxic 6/19/2017    Erectile dysfunction 8/20/2012    Hiatal hernia     HLD (hyperlipidemia) 7/31/2012    Hyperprolactinemia 12/29/2014    Hypertension     Increased prolactin level 9/17/2012    39.9 (3-15 range)     Lumbar disc disease 7/31/2012    Male hypogonadism 9/12/2012    Obesity 7/31/2012    S/p Gastric Bypass Surgery     Peripheral vascular disease     Pituitary microadenoma 2/26/2013    Pneumonia, community acquired 6/18/2017    Proteinuria     Pulmonary hypertension 12/26/2017    Renal insufficiency     Renovascular hypertension 7/31/2012    Severe sepsis 6/19/2017    Sleep apnea     Traumatic rhabdomyolysis 11/7/2017    Type 2 diabetes mellitus with stage 3 chronic kidney disease, with long-term current use of insulin 7/31/2012    Venous insufficiency        Past Surgical History:   Procedure Laterality Date    CATARACT EXTRACTION, BILATERAL      CHOLECYSTECTOMY      CYSTOLITHOTOMY  09/23/2005    GASTRIC BYPASS  03/28/2007    [id number]    RADIOFREQUENCY ABLATION  10/18/2005    right subeustachian isthums ablation for atrial flutter       Review of patient's allergies indicates:  No Known Allergies    Current Facility-Administered Medications on File Prior to Encounter   Medication    [DISCONTINUED] acetaminophen tablet 650 mg    [DISCONTINUED] albuterol-ipratropium 2.5mg-0.5mg/3mL nebulizer solution 3 mL    [DISCONTINUED] amoxicillin-clavulanate 500-125mg per  tablet 500 mg    [DISCONTINUED] apixaban tablet 2.5 mg    [DISCONTINUED] aspirin EC tablet 81 mg    [DISCONTINUED] carvedilol tablet 12.5 mg    [DISCONTINUED] furosemide tablet 40 mg    [DISCONTINUED] gabapentin capsule 300 mg    [DISCONTINUED] hydrocodone-acetaminophen 5-325mg per tablet 1 tablet    [DISCONTINUED] lactulose 20 gram/30 mL solution Soln 15 g    [DISCONTINUED] losartan tablet 100 mg    [DISCONTINUED] ondansetron disintegrating tablet 8 mg    [DISCONTINUED] ondansetron injection 4 mg    [DISCONTINUED] oxyCODONE immediate release tablet 10 mg    [DISCONTINUED] ramelteon tablet 8 mg    [DISCONTINUED] sodium chloride 0.9% flush 5 mL    [DISCONTINUED] tamsulosin 24 hr capsule 0.4 mg     Current Outpatient Prescriptions on File Prior to Encounter   Medication Sig    acetaminophen (TYLENOL) 500 MG tablet Take 2 tablets (1,000 mg total) by mouth every 8 (eight) hours as needed for Pain. Pain Scaled < 7    apixaban 2.5 mg Tab Take 1 tablet (2.5 mg total) by mouth 2 (two) times daily.    blood sugar diagnostic Strp Check glucose 2-3x/day before meals and/or bedtime (Patient taking differently: Check glucose 1-2 times daily)    calcium-vitamin D 600 mg(1,500mg) -400 unit Tab 1 tab daily    COREG CR 20 mg 24 hr capsule Take 20 mg by mouth once daily.     ferrous sulfate dried (SLOW FE) 160 mg (50 mg iron) TbSR Take 1 tablet (160 mg total) by mouth once daily.    furosemide (LASIX) 40 MG tablet Take 1 tablet (40 mg total) by mouth once daily.    gabapentin (NEURONTIN) 300 MG capsule TAKE 1 CAPSULE BY MOUTH AT BEDTIME FOR DIABETIC NEUROPATHY    insulin glargine (LANTUS SOLOSTAR) 100 unit/mL (3 mL) InPn pen 25 units SQ in AM for Diabetes    lancets (ACCU-CHEK SOFTCLIX LANCETS) Misc 1 strip by Misc.(Non-Drug; Combo Route) route 2 (two) times daily. (Patient taking differently: 1 strip by Misc.(Non-Drug; Combo Route) route once daily. )    lansoprazole (PREVACID SOLUTAB) 30 MG disintegrating  tablet Take 1 tablet (30 mg total) by mouth daily as needed (acid reflux).    losartan (COZAAR) 100 MG tablet Take 1 tablet (100 mg total) by mouth once daily.    multivitamin (THERAGRAN) per tablet Take 1 tablet by mouth once daily.    oxyCODONE (OXYCONTIN) 30 mg TR12 12 hr tablet Take 1 tablet (30 mg total) by mouth every 12 (twelve) hours.    tamsulosin (FLOMAX) 0.4 mg Cp24 Take 1 capsule (0.4 mg total) by mouth once daily. 1 Capsule, Sust. Release 24 hr Oral Every day.  for prostate (Patient taking differently: Take 0.4 mg by mouth once daily. )    timolol maleate 0.5% (TIMOPTIC) 0.5 % Drop Place 1 drop into both eyes once daily.     TRAVATAN Z 0.004 % Drop Place 1 drop into both eyes every evening.     walker Misc 1 Rolling Walker with a Seat     Family History     None        Social History Main Topics    Smoking status: Former Smoker     Packs/day: 4.00     Years: 30.00     Quit date: 1/9/1990    Smokeless tobacco: Never Used      Comment: pt was up to 4 packs a day before.    Alcohol use No    Drug use: No    Sexual activity: Not on file     Review of Systems   Constitutional: Negative for chills, fatigue and fever.   HENT: Negative for congestion, facial swelling, hearing loss and trouble swallowing.    Eyes: Negative for photophobia and visual disturbance.   Respiratory: Negative for chest tightness, shortness of breath and wheezing.    Cardiovascular: Negative for chest pain, palpitations and leg swelling.   Gastrointestinal: Negative for abdominal pain, blood in stool, constipation, diarrhea, nausea and vomiting.   Endocrine: Negative.    Genitourinary: Negative.    Musculoskeletal: Negative for back pain, joint swelling and myalgias.   Skin: Negative.    Allergic/Immunologic: Negative.    Neurological: Negative for dizziness, facial asymmetry, speech difficulty, weakness and numbness.   Hematological: Negative.    Psychiatric/Behavioral: Negative for agitation, confusion and dysphoric mood.  The patient is not nervous/anxious.      Objective:     Vital Signs (Most Recent):  Temp: 98.3 °F (36.8 °C) (12/30/17 0843)  Pulse: 71 (12/30/17 0843)  Resp: 18 (12/30/17 0843)  BP: (!) 161/72 (12/30/17 0843)  SpO2: 97 % (12/30/17 0843) Vital Signs (24h Range):  Temp:  [97.4 °F (36.3 °C)-98.9 °F (37.2 °C)] 98.3 °F (36.8 °C)  Pulse:  [49-71] 71  Resp:  [14-18] 18  SpO2:  [97 %-98 %] 97 %  BP: (155-185)/(70-77) 161/72     Weight: 105 kg (231 lb 7.7 oz)  Body mass index is 33.21 kg/m².    Physical Exam   Constitutional: He is oriented to person, place, and time. Vital signs are normal. He appears well-developed and well-nourished.  Non-toxic appearance. He does not appear ill. No distress.   Patient is hard of hearing    HENT:   Head: Normocephalic and atraumatic.   Eyes: Conjunctivae and EOM are normal. Pupils are equal, round, and reactive to light. No scleral icterus.   Neck: Normal range of motion and full passive range of motion without pain. Neck supple. No JVD present. Carotid bruit is not present. No thyromegaly present.   Cardiovascular: Normal rate, S1 normal, S2 normal, normal heart sounds and normal pulses.  An irregularly irregular rhythm present. Exam reveals no gallop, no S3, no S4 and no friction rub.    No murmur heard.  +1 LE edema B/L   Pulmonary/Chest: Effort normal. No accessory muscle usage. No tachypnea. No respiratory distress. He has no decreased breath sounds. He has no wheezes. He has rhonchi (scattered rhonchi) in the right upper field and the left upper field. He has no rales.   Abdominal: Soft. Normal appearance and bowel sounds are normal. He exhibits no shifting dullness, no distension, no abdominal bruit and no ascites. There is no hepatosplenomegaly. There is no tenderness. There is no rigidity and no guarding.   Musculoskeletal: Normal range of motion. He exhibits no edema or tenderness.   Neurological: He is alert and oriented to person, place, and time. He has normal strength. He is  not disoriented. No cranial nerve deficit or sensory deficit. GCS eye subscore is 4. GCS verbal subscore is 5. GCS motor subscore is 6.   Skin: Skin is warm, dry and intact. No abrasion and no lesion noted.   Multiple ecchymosis and skin tears to bilateral upper extremities   Bilateral LE skin changes consistent with lipodermatosclerosis and stasis dermatitis    Psychiatric: He has a normal mood and affect. His behavior is normal. Judgment and thought content normal. His mood appears not anxious. His speech is not slurred. He is not actively hallucinating. Cognition and memory are normal. He does not exhibit a depressed mood. He is attentive.     Significant Labs: All pertinent labs within the past 24 hours have been reviewed.    Significant Imaging: I have reviewed all pertinent imaging results/findings within the past 24 hours.    Assessment/Plan:     * Pneumonia of right lower lobe due to infectious organism    complete course of augmentin as prescribed  monitor sats  leukocytosis resolved; afebrile        Renovascular hypertension    BP elevated  BP goal of < 140/90  continue carvedilol, furosemide, and losartan but titrate as needed   hydralazine as needed         Arm wound, left, initial encounter    wound care as prescribed         Debility    Cont with PT/OT for gait training and strengthening and restoration of ADL's   Fall precautions   Cont DVT prophylaxis with apixaban   Anticoagulants   Medication Route Frequency    apixaban tablet 2.5 mg Oral BID           Chronic anticoagulation - apixaban     Plan as above        Stage 3 chronic kidney disease    creatinine at baseline  avoid nephrotoxic agents         Persistent atrial fibrillation    rate controlled on carvedilol  continue apixaban         Coronary artery disease involving native coronary artery of native heart without angina pectoris    no acute issues  continue medical issues ASA, losartan, carvedilol, and furosemide         Venous stasis of  lower extremity    Low Na diet  CHARO hose  Leg elevation           Obstructive sleep apnea on CPAP    does not use CPAP at home   O2 as needed   Follow up as outpatient with sleep medicine         Benign prostatic hyperplasia    continue tamsulosin         Hyperlipidemia    Cont home statin to treat         Type 2 diabetes mellitus with stage 3 chronic kidney disease, with long-term current use of insulin    blood glucoses well controlled well controlled   home dose of glargine held as his A1C with WNL   continue with accuchecks and SSI             Jaqueline London MD  Department of Hospital Medicine  [facility]

## 2017-12-30 NOTE — PLAN OF CARE
Problem: Occupational Therapy Goal  Goal: Occupational Therapy Goal  Goals to be met by: 14 days    Patient will increase functional independence with ADLs by performing:    UE Dressing with [place].  LE Dressing with Modified [place].  Grooming while standing with Modified [place].  Toileting from toilet with Modified [place] for hygiene and clothing management.   Bathing from  shower chair/bench with Modified [place].  Toilet transfer to toilet with Modified [place].  Standing tolerance 10 minutes to complete standing ADL and IADL tasks.    Outcome: Ongoing (interventions implemented as appropriate)  Patient in agreement with goals and treatment plan.  MILLER Martinez  12/30/2017

## 2017-12-30 NOTE — ASSESSMENT & PLAN NOTE
blood glucoses well controlled well controlled   home dose of glargine held as his A1C with WNL   continue with accuchecks and SSI

## 2017-12-30 NOTE — PLAN OF CARE
Problem: Patient Care Overview  Goal: Plan of Care Review  Outcome: Ongoing (interventions implemented as appropriate)   12/29/17 2311   Coping/Psychosocial   Plan Of Care Reviewed With patient       Problem: Fall Risk (Adult)  Goal: Absence of Falls  Patient will demonstrate the desired outcomes by discharge/transition of care.   Outcome: Ongoing (interventions implemented as appropriate)   12/29/17 2311   Fall Risk (Adult)   Absence of Falls making progress toward outcome       Problem: Pressure Ulcer Risk (Manan Scale) (Adult,Obstetrics,Pediatric)  Goal: Skin Integrity  Patient will demonstrate the desired outcomes by discharge/transition of care.   Outcome: Ongoing (interventions implemented as appropriate)   12/29/17 2311   Pressure Ulcer Risk (Manan Scale) (Adult,Obstetrics,Pediatric)   Skin Integrity making progress toward outcome

## 2017-12-30 NOTE — ASSESSMENT & PLAN NOTE
BP elevated  BP goal of < 140/90  continue carvedilol, furosemide, and losartan but titrate as needed   hydralazine as needed

## 2017-12-30 NOTE — ASSESSMENT & PLAN NOTE
Nutrition problem  Altered nutrition related laboratory values    Related to (etiology):   DM    Signs and Symptoms (as evidenced by):     Recent Labs  Lab 12/30/17  0734   POCTGLUCOSE 162*       Interventions/Recommendations (treatment strategy):  See above    Nutrition Diagnosis Status:   New

## 2017-12-30 NOTE — ASSESSMENT & PLAN NOTE
Cont with PT/OT for gait training and strengthening and restoration of ADL's   Fall precautions   Cont DVT prophylaxis with apixaban   Anticoagulants   Medication Route Frequency    apixaban tablet 2.5 mg Oral BID

## 2017-12-30 NOTE — PLAN OF CARE
Problem: Physical Therapy Goal  Goal: Physical Therapy Goal  Goals to be met by: 14 days     Patient will increase functional independence with mobility by performin. Supine to sit with Modified [place]  2. Sit to supine with Modified [place]  3. Sit to stand transfer with Supervision  4. Bed to chair transfer with Supervision using Rolling Walker  5. Gait  x 150 feet with Supervision using Rolling Walker.   6. Ascend/Descend 4 inch curb step with Stand-by Assistance using Rolling Walker.  7. Stand for 3 minutes with Stand-by Assistance using Rolling Walker while performing dynamic standing activity.  8. Lower extremity exercise program x20 reps per handout, with assistance as needed  9. Pt will improve TUG time to 20s or less in order to decrease fall risk    Outcome: Ongoing (interventions implemented as appropriate)  PT eval completed. Pt will begin PT POC.    Jo-Ann Pelayo, PT  2017

## 2017-12-30 NOTE — HPI
Chief Complaint/Reason for Admission: Debility    History of Present Illness:  Patient is a 82 y.o. male with PMH of  Aortic sclerosis; BPH; CAD;  Chronic allergic rhinitis; Chronic anticoagulation - apixaban ; Chronic atrial fibrillation; CKD stage III; CPAP dependence; HLD; Hyperprolactinemia; Hypertension; Lumbar disc disease; Male hypogonadism; Obesity; Peripheral vascular disease; Pituitary microadenoma; Pulmonary hypertension; Renovascular hypertension; Sleep apnea; Traumatic rhabdomyolysis; Type 2 diabetes mellitus with stage 3 chronic kidney disease, with long-term current use of insulin; and Venous insufficiency. He was admitted for respiratory distress and altered mental status. Sats on admit were in the 80s. He has a history of tobacco use of 3.5 packs per day x 30 years and had worked as a  with asbestos exposure. He had been followed by his PCP and pulmonary and a CT chest earlier this month showed tree in bud nodularity and inflammation. He was seen by pulmonary in July but did not follow up. During his hospitalization he was treated for RLL PNA with augmentin and had clinical improvement. He had JOHN PAUL on CKD 3 but it resolved back to baseline, and a slight troponemia which cardiology attributed to demand ischemia as he was hypertensive on presentation with SBP in the 240s. He was recently hospitalized in November for rhabdomyolysis and was discharged home after a short stay. Patient has been working with PT/OT who recommend SNF for further balance/mobility training.

## 2017-12-30 NOTE — PLAN OF CARE
Problem: Patient Care Overview  Goal: Plan of Care Review  Outcome: Ongoing (interventions implemented as appropriate)  Recommendations     Recommendation/Intervention: 1.Continue current diet. 2. RD to f/u with interview and assess education needs. 3. RD to monitor.   Goals: Adherence to hospital Rx diet  Nutrition Goal Status: new  Communication of RD Recs:  (POC, sticky note)

## 2017-12-30 NOTE — SUBJECTIVE & OBJECTIVE
Past Medical History:   Diagnosis Date    Allergy     Aortic sclerosis     BPH (benign prostatic hypertrophy)     CAD (coronary artery disease) 7/31/2012    Calcium nephrolithiasis     Chronic allergic rhinitis 5/28/2013    Chronic anticoagulation - apixaban  11/7/2017    For AFib    Chronic atrial fibrillation 10/16/2014    CKD (chronic kidney disease), stage III 10/16/2014    Colon polyps     Coronary artery disease     CPAP (continuous positive airway pressure) dependence     Degenerative disc disease     Encephalopathy, toxic 6/19/2017    Erectile dysfunction 8/20/2012    Hiatal hernia     HLD (hyperlipidemia) 7/31/2012    Hyperprolactinemia 12/29/2014    Hypertension     Increased prolactin level 9/17/2012    39.9 (3-15 range)     Lumbar disc disease 7/31/2012    Male hypogonadism 9/12/2012    Obesity 7/31/2012    S/p Gastric Bypass Surgery     Peripheral vascular disease     Pituitary microadenoma 2/26/2013    Pneumonia, community acquired 6/18/2017    Proteinuria     Pulmonary hypertension 12/26/2017    Renal insufficiency     Renovascular hypertension 7/31/2012    Severe sepsis 6/19/2017    Sleep apnea     Traumatic rhabdomyolysis 11/7/2017    Type 2 diabetes mellitus with stage 3 chronic kidney disease, with long-term current use of insulin 7/31/2012    Venous insufficiency        Past Surgical History:   Procedure Laterality Date    CATARACT EXTRACTION, BILATERAL      CHOLECYSTECTOMY      CYSTOLITHOTOMY  09/23/2005    GASTRIC BYPASS  03/28/2007    [id number]    RADIOFREQUENCY ABLATION  10/18/2005    right subeustachian isthums ablation for atrial flutter       Review of patient's allergies indicates:  No Known Allergies    Current Facility-Administered Medications on File Prior to Encounter   Medication    [DISCONTINUED] acetaminophen tablet 650 mg    [DISCONTINUED] albuterol-ipratropium 2.5mg-0.5mg/3mL nebulizer solution 3 mL    [DISCONTINUED]  amoxicillin-clavulanate 500-125mg per tablet 500 mg    [DISCONTINUED] apixaban tablet 2.5 mg    [DISCONTINUED] aspirin EC tablet 81 mg    [DISCONTINUED] carvedilol tablet 12.5 mg    [DISCONTINUED] furosemide tablet 40 mg    [DISCONTINUED] gabapentin capsule 300 mg    [DISCONTINUED] hydrocodone-acetaminophen 5-325mg per tablet 1 tablet    [DISCONTINUED] lactulose 20 gram/30 mL solution Soln 15 g    [DISCONTINUED] losartan tablet 100 mg    [DISCONTINUED] ondansetron disintegrating tablet 8 mg    [DISCONTINUED] ondansetron injection 4 mg    [DISCONTINUED] oxyCODONE immediate release tablet 10 mg    [DISCONTINUED] ramelteon tablet 8 mg    [DISCONTINUED] sodium chloride 0.9% flush 5 mL    [DISCONTINUED] tamsulosin 24 hr capsule 0.4 mg     Current Outpatient Prescriptions on File Prior to Encounter   Medication Sig    acetaminophen (TYLENOL) 500 MG tablet Take 2 tablets (1,000 mg total) by mouth every 8 (eight) hours as needed for Pain. Pain Scaled < 7    apixaban 2.5 mg Tab Take 1 tablet (2.5 mg total) by mouth 2 (two) times daily.    blood sugar diagnostic Strp Check glucose 2-3x/day before meals and/or bedtime (Patient taking differently: Check glucose 1-2 times daily)    calcium-vitamin D 600 mg(1,500mg) -400 unit Tab 1 tab daily    COREG CR 20 mg 24 hr capsule Take 20 mg by mouth once daily.     ferrous sulfate dried (SLOW FE) 160 mg (50 mg iron) TbSR Take 1 tablet (160 mg total) by mouth once daily.    furosemide (LASIX) 40 MG tablet Take 1 tablet (40 mg total) by mouth once daily.    gabapentin (NEURONTIN) 300 MG capsule TAKE 1 CAPSULE BY MOUTH AT BEDTIME FOR DIABETIC NEUROPATHY    insulin glargine (LANTUS SOLOSTAR) 100 unit/mL (3 mL) InPn pen 25 units SQ in AM for Diabetes    lancets (ACCU-CHEK SOFTCLIX LANCETS) Misc 1 strip by Misc.(Non-Drug; Combo Route) route 2 (two) times daily. (Patient taking differently: 1 strip by Misc.(Non-Drug; Combo Route) route once daily. )    lansoprazole  (PREVACID SOLUTAB) 30 MG disintegrating tablet Take 1 tablet (30 mg total) by mouth daily as needed (acid reflux).    losartan (COZAAR) 100 MG tablet Take 1 tablet (100 mg total) by mouth once daily.    multivitamin (THERAGRAN) per tablet Take 1 tablet by mouth once daily.    oxyCODONE (OXYCONTIN) 30 mg TR12 12 hr tablet Take 1 tablet (30 mg total) by mouth every 12 (twelve) hours.    tamsulosin (FLOMAX) 0.4 mg Cp24 Take 1 capsule (0.4 mg total) by mouth once daily. 1 Capsule, Sust. Release 24 hr Oral Every day.  for prostate (Patient taking differently: Take 0.4 mg by mouth once daily. )    timolol maleate 0.5% (TIMOPTIC) 0.5 % Drop Place 1 drop into both eyes once daily.     TRAVATAN Z 0.004 % Drop Place 1 drop into both eyes every evening.     walker Misc 1 Rolling Walker with a Seat     Family History     None        Social History Main Topics    Smoking status: Former Smoker     Packs/day: 4.00     Years: 30.00     Quit date: 1/9/1990    Smokeless tobacco: Never Used      Comment: pt was up to 4 packs a day before.    Alcohol use No    Drug use: No    Sexual activity: Not on file     Review of Systems   Constitutional: Negative for chills, fatigue and fever.   HENT: Negative for congestion, facial swelling, hearing loss and trouble swallowing.    Eyes: Negative for photophobia and visual disturbance.   Respiratory: Negative for chest tightness, shortness of breath and wheezing.    Cardiovascular: Negative for chest pain, palpitations and leg swelling.   Gastrointestinal: Negative for abdominal pain, blood in stool, constipation, diarrhea, nausea and vomiting.   Endocrine: Negative.    Genitourinary: Negative.    Musculoskeletal: Negative for back pain, joint swelling and myalgias.   Skin: Negative.    Allergic/Immunologic: Negative.    Neurological: Negative for dizziness, facial asymmetry, speech difficulty, weakness and numbness.   Hematological: Negative.    Psychiatric/Behavioral: Negative for  agitation, confusion and dysphoric mood. The patient is not nervous/anxious.      Objective:     Vital Signs (Most Recent):  Temp: 98.3 °F (36.8 °C) (12/30/17 0843)  Pulse: 71 (12/30/17 0843)  Resp: 18 (12/30/17 0843)  BP: (!) 161/72 (12/30/17 0843)  SpO2: 97 % (12/30/17 0843) Vital Signs (24h Range):  Temp:  [97.4 °F (36.3 °C)-98.9 °F (37.2 °C)] 98.3 °F (36.8 °C)  Pulse:  [49-71] 71  Resp:  [14-18] 18  SpO2:  [97 %-98 %] 97 %  BP: (155-185)/(70-77) 161/72     Weight: 105 kg (231 lb 7.7 oz)  Body mass index is 33.21 kg/m².    Physical Exam   Constitutional: He is oriented to person, place, and time. Vital signs are normal. He appears well-developed and well-nourished.  Non-toxic appearance. He does not appear ill. No distress.   Patient is hard of hearing    HENT:   Head: Normocephalic and atraumatic.   Eyes: Conjunctivae and EOM are normal. Pupils are equal, round, and reactive to light. No scleral icterus.   Neck: Normal range of motion and full passive range of motion without pain. Neck supple. No JVD present. Carotid bruit is not present. No thyromegaly present.   Cardiovascular: Normal rate, S1 normal, S2 normal, normal heart sounds and normal pulses.  An irregularly irregular rhythm present. Exam reveals no gallop, no S3, no S4 and no friction rub.    No murmur heard.  +1 LE edema B/L   Pulmonary/Chest: Effort normal. No accessory muscle usage. No tachypnea. No respiratory distress. He has no decreased breath sounds. He has no wheezes. He has rhonchi (scattered rhonchi) in the right upper field and the left upper field. He has no rales.   Abdominal: Soft. Normal appearance and bowel sounds are normal. He exhibits no shifting dullness, no distension, no abdominal bruit and no ascites. There is no hepatosplenomegaly. There is no tenderness. There is no rigidity and no guarding.   Musculoskeletal: Normal range of motion. He exhibits no edema or tenderness.   Neurological: He is alert and oriented to person,  place, and time. He has normal strength. He is not disoriented. No cranial nerve deficit or sensory deficit. GCS eye subscore is 4. GCS verbal subscore is 5. GCS motor subscore is 6.   Skin: Skin is warm, dry and intact. No abrasion and no lesion noted.   Multiple ecchymosis and skin tears to bilateral upper extremities   Bilateral LE skin changes consistent with lipodermatosclerosis and stasis dermatitis    Psychiatric: He has a normal mood and affect. His behavior is normal. Judgment and thought content normal. His mood appears not anxious. His speech is not slurred. He is not actively hallucinating. Cognition and memory are normal. He does not exhibit a depressed mood. He is attentive.     Significant Labs: All pertinent labs within the past 24 hours have been reviewed.    Significant Imaging: I have reviewed all pertinent imaging results/findings within the past 24 hours.

## 2017-12-31 LAB
BACTERIA BLD CULT: NORMAL
BACTERIA BLD CULT: NORMAL
POCT GLUCOSE: 158 MG/DL (ref 70–110)
POCT GLUCOSE: 166 MG/DL (ref 70–110)
POCT GLUCOSE: 191 MG/DL (ref 70–110)
POCT GLUCOSE: 251 MG/DL (ref 70–110)

## 2017-12-31 PROCEDURE — 12000000 HC SNF SEMI-PRIVATE ROOM

## 2017-12-31 PROCEDURE — 25000003 PHARM REV CODE 250: Performed by: HOSPITALIST

## 2017-12-31 PROCEDURE — 97535 SELF CARE MNGMENT TRAINING: CPT

## 2017-12-31 RX ORDER — AMLODIPINE BESYLATE 5 MG/1
5 TABLET ORAL DAILY
Status: DISCONTINUED | OUTPATIENT
Start: 2017-12-31 | End: 2017-12-31

## 2017-12-31 RX ADMIN — RAMELTEON 8 MG: 8 TABLET, FILM COATED ORAL at 08:12

## 2017-12-31 RX ADMIN — CARVEDILOL 12.5 MG: 12.5 TABLET, FILM COATED ORAL at 09:12

## 2017-12-31 RX ADMIN — APIXABAN 2.5 MG: 2.5 TABLET, FILM COATED ORAL at 08:12

## 2017-12-31 RX ADMIN — LOSARTAN POTASSIUM 100 MG: 50 TABLET, FILM COATED ORAL at 08:12

## 2017-12-31 RX ADMIN — FUROSEMIDE 40 MG: 40 TABLET ORAL at 08:12

## 2017-12-31 RX ADMIN — AMOXICILLIN AND CLAVULANATE POTASSIUM 500 MG: 500; 125 TABLET, FILM COATED ORAL at 08:12

## 2017-12-31 RX ADMIN — TAMSULOSIN HYDROCHLORIDE 0.4 MG: 0.4 CAPSULE ORAL at 08:12

## 2017-12-31 RX ADMIN — ASPIRIN 81 MG: 81 TABLET, COATED ORAL at 08:12

## 2017-12-31 RX ADMIN — GABAPENTIN 300 MG: 300 CAPSULE ORAL at 08:12

## 2017-12-31 RX ADMIN — INSULIN ASPART 3 UNITS: 100 INJECTION, SOLUTION INTRAVENOUS; SUBCUTANEOUS at 12:12

## 2017-12-31 RX ADMIN — STANDARDIZED SENNA CONCENTRATE AND DOCUSATE SODIUM 1 TABLET: 8.6; 5 TABLET, FILM COATED ORAL at 08:12

## 2017-12-31 NOTE — PT/OT/SLP PROGRESS
"Occupational Therapy  Treatment    Joseph Valerio   MRN: [id number]   Admitting Diagnosis: Pneumonia of right lower lobe due to infectious organism    OT Date of Treatment: 12/31/17  Total Time (min): 43 min    Billable Minutes:  Self Care/Home Management 43    General Precautions: Standard, fall ([place])  Orthopedic Precautions:    Braces:      Do you have any cultural, spiritual, Mormon conflicts, given your current situation?: no    Subjective:  Communicated with nurse and PCT prior to session.  "I've got diarrhea."    Pain/Comfort  Pain Rating 1: 0/10  Pain Rating Post-Intervention 1: 0/10    Objective:   Found seated on toilet in bathroom nurse and PCT present    Functional Status:  MDS G  Transfer Functional Status: CGA-Min (A) with FWW from toilet to WC with assist for safety during sit to stand  Dressing Functional Status: 3:mod(A)-Max(A) seated at sink with extra time and assist to thread BLE in pants, patient able to thread underwear  Toilet Use Functional Status: mod(A) for through hygiene   Personal Hygiene Functional Status: CGA-Min (A) for shave with electric razor seated at sink  Bathing Functional Status: mod(A)-Max(A) for sponge bath at sink with assist for posterio-aurelia area, back and B lower leg and feet  Moving from seated to standing position: Not steady, only able to stabilize with staff assistance  Turning around and facing the opposite direction while walking: Not steady, only able to stabilize with staff assistance  Moving on and off the toilet: Not steady, only able to stabilize with staff assistance  Surface-to-surface transfer (transfer between bed and chair or wheelchair): Not steady, only able to stabilize with staff assistance    Treatment:  Education and training provided for safety and fall prevention during self-care and functional mobility. Instruction provided for pacing as patient easily fatigues and become SOB.      Patient left up in chair with call button in reach and seated " bedside present    ASSESSMENT:  Joseph Valerio is a 82 y.o. male with a medical diagnosis of Pneumonia of right lower lobe due to infectious organism. Tolerated session well in sitting as patient was fatigued from having diarrhea with multiple trips to the toilet. Presents with skin sore at buttocks and right shoulder and edema BLE. Pleasant and cooperative with treatment and eager to participate. Will continue to benefit from OT intervention to focus on energy conservation, safety, fall prevention and standing balance and tolerance in order to safely participate in meaningful self-care and functional mobility activities at highest level of independence and return home.         Rehab identified problem list/impairments: weakness, impaired endurance, impaired self care skills, impaired functional mobilty, gait instability, impaired balance, decreased safety awareness, edema    Rehab potential is good    Activity tolerance: Poor    Discharge recommendations: home with home health, home health OT     Barriers to discharge: Decreased caregiver support     Equipment recommendations: bedside commode, shower chair     GOALS:    Occupational Therapy Goals        Problem: Occupational Therapy Goal    Goal Priority Disciplines Outcome Interventions   Occupational Therapy Goal     OT, PT/OT Ongoing (interventions implemented as appropriate)    Description:  Goals to be met by: 14 days    Patient will increase functional independence with ADLs by performing:    UE Dressing with [place].  LE Dressing with Modified [place].  Grooming while standing with Modified [place].  Toileting from toilet with Modified [place] for hygiene and clothing management.   Bathing from  shower chair/bench with Modified [place].  Toilet transfer to toilet with Modified [place].  Standing tolerance 10 minutes to complete standing ADL and IADL tasks.                      Plan:  Patient to be seen   to address the above  listed problems via    Plan of Care expires:    Plan of Care reviewed with: patient    Laura A MILLER Falcon  12/31/2017

## 2017-12-31 NOTE — PLAN OF CARE
Problem: Patient Care Overview  Goal: Plan of Care Review  Outcome: Revised  Repositions independently with encouragement. mepilex in place to nba buttock ulcer and rt arm skin tears. Afebrile. Amoxicillin PO scheduled. Monitored for pain and safety. Safety maintained. Denies pain.

## 2017-12-31 NOTE — PROGRESS NOTES
Early morning /88 P 57. admin am meds. meds effective /73 P 71. BP now 129/59 P 53 phone notified Dr. Rodarte who ordered to hold new scheduled med amlodipine

## 2017-12-31 NOTE — PLAN OF CARE
Problem: Occupational Therapy Goal  Goal: Occupational Therapy Goal  Goals to be met by: 14 days    Patient will increase functional independence with ADLs by performing:    UE Dressing with [place].  LE Dressing with Modified [place].  Grooming while standing with Modified [place].  Toileting from toilet with Modified [place] for hygiene and clothing management.   Bathing from  shower chair/bench with Modified [place].  Toilet transfer to toilet with Modified [place].  Standing tolerance 10 minutes to complete standing ADL and IADL tasks.     Outcome: Ongoing (interventions implemented as appropriate)  Goals appropriate. Continue with current plan of care.  MILLER Martinez  12/31/2017

## 2017-12-31 NOTE — PLAN OF CARE
Problem: Patient Care Overview  Goal: Plan of Care Review  Outcome: Ongoing (interventions implemented as appropriate)   12/30/17 2215   Coping/Psychosocial   Plan Of Care Reviewed With patient       Problem: Diabetes, Type 2 (Adult)  Goal: Signs and Symptoms of Listed Potential Problems Will be Absent, Minimized or Managed (Diabetes, Type 2)  Signs and symptoms of listed potential problems will be absent, minimized or managed by discharge/transition of care (reference Diabetes, Type 2 (Adult) CPG).   Outcome: Ongoing (interventions implemented as appropriate)   12/30/17 2215   Diabetes, Type 2   Problems Assessed (Type 2 Diabetes) all   Problems Present (Type 2 Diabetes) hyperglycemia       Problem: Fall Risk (Adult)  Goal: Absence of Falls  Patient will demonstrate the desired outcomes by discharge/transition of care.   Outcome: Ongoing (interventions implemented as appropriate)   12/30/17 2215   Fall Risk (Adult)   Absence of Falls making progress toward outcome

## 2018-01-01 ENCOUNTER — PATIENT OUTREACH (OUTPATIENT)
Dept: ADMINISTRATIVE | Facility: CLINIC | Age: 83
End: 2018-01-01

## 2018-01-01 LAB
POCT GLUCOSE: 153 MG/DL (ref 70–110)
POCT GLUCOSE: 177 MG/DL (ref 70–110)
POCT GLUCOSE: 203 MG/DL (ref 70–110)
POCT GLUCOSE: 213 MG/DL (ref 70–110)

## 2018-01-01 PROCEDURE — 12000000 HC SNF SEMI-PRIVATE ROOM

## 2018-01-01 PROCEDURE — 97803 MED NUTRITION INDIV SUBSEQ: CPT

## 2018-01-01 PROCEDURE — 25000003 PHARM REV CODE 250: Performed by: HOSPITALIST

## 2018-01-01 RX ADMIN — LOSARTAN POTASSIUM 100 MG: 50 TABLET, FILM COATED ORAL at 08:01

## 2018-01-01 RX ADMIN — APIXABAN 2.5 MG: 2.5 TABLET, FILM COATED ORAL at 08:01

## 2018-01-01 RX ADMIN — CARVEDILOL 12.5 MG: 12.5 TABLET, FILM COATED ORAL at 08:01

## 2018-01-01 RX ADMIN — AMOXICILLIN AND CLAVULANATE POTASSIUM 500 MG: 500; 125 TABLET, FILM COATED ORAL at 08:01

## 2018-01-01 RX ADMIN — TAMSULOSIN HYDROCHLORIDE 0.4 MG: 0.4 CAPSULE ORAL at 08:01

## 2018-01-01 RX ADMIN — RAMELTEON 8 MG: 8 TABLET, FILM COATED ORAL at 08:01

## 2018-01-01 RX ADMIN — GABAPENTIN 300 MG: 300 CAPSULE ORAL at 08:01

## 2018-01-01 RX ADMIN — INSULIN ASPART 1 UNITS: 100 INJECTION, SOLUTION INTRAVENOUS; SUBCUTANEOUS at 08:01

## 2018-01-01 RX ADMIN — ASPIRIN 81 MG: 81 TABLET, COATED ORAL at 08:01

## 2018-01-01 RX ADMIN — FUROSEMIDE 40 MG: 40 TABLET ORAL at 08:01

## 2018-01-01 NOTE — PLAN OF CARE
Problem: Fall Risk (Adult)  Goal: Absence of Falls  Patient will demonstrate the desired outcomes by discharge/transition of care.   Outcome: Ongoing (interventions implemented as appropriate)  Pt. had no falls this shift.will continue to monitor.

## 2018-01-01 NOTE — PLAN OF CARE
Problem: Diabetes, Type 2 (Adult)  Intervention: Optimize Glycemic Control     Type 2 diabetes mellitus with stage 3 chronic kidney disease, with long-term current use of insulin     Nutrition problem  Altered nutrition related laboratory values     Related to (etiology):   DM     Signs and Symptoms (as evidenced by):      Recent Labs  Lab 12/30/17  0734   POCTGLUCOSE 162*          Recommendation/Intervention: Continue cardiac diabetic 2000 diet, Diabetic teaching if accepted.  Goals: PO >85% of needs  Nutrition Goal Status: progressing towards goal

## 2018-01-01 NOTE — PROGRESS NOTES
"[facility]  Adult Nutrition  Progress Note    SUMMARY     Recommendations    Recommendation/Intervention: Continue cardiac diabetic 2000 diet  Goals: PO >85% of needs  Nutrition Goal Status: progressing towards goal  Communication of RD Recs:  (POC, sticky note)    Continuum of Care Plan      home care  Reason for Assessment    Reason for Assessment: RD follow-up     Interdisciplinary Rounds: did not attend   General Information Comments: pt very angry about being here, grumpy  Nutrition Discharge Planning: Cardiac Diabetic diet     Nutrition Prescription Ordered  Current Diet Order: Cardiac, 2000 diabetic  Nutrition Order Comments: pt does not follow any diet at home       Oral Nutrition Supplement: none     Evaluation of Received Nutrients/Fluid Intake          Energy Calories Required: not meeting needs         Protein Required: meeting needs         Fluid Required: meeting needs     Comments: PO 50-75%     % Intake of Estimated Energy Needs: 75 - 100 %  % Meal Intake: 75%     Nutrition Risk Screen     Nutrition Risk Screen: no indicators present    Nutrition/Diet History       Typical Food/Fluid Intake: TODD  Food Preferences: no preferences patient just reports he is eating smaller amounts these days, family states less fast food                         Labs/Tests/Procedures/Meds       Pertinent Labs Reviewed: reviewed, pertinent     Pertinent Medications Reviewed: other (see comments)       Physical Findings    Overall Physical Appearance: obese (per BMI)     Oral/Mouth Cavity:  (teeth missing)  Skin:  (Manan Score 19, wound skin tear lower arm, pressure ulcer)    Anthropometrics    Temp: 98.2 °F (36.8 °C)     Height: 5' 10" (177.8 cm)  Weight Method: Standard Scale  Weight: 103 kg (227 lb 1.2 oz)  Ideal Body Weight (IBW), Male: 166 lb     % Ideal Body Weight, Male (lb): 139.45 lb     BMI (Calculated): 33.3  BMI Grade: 30 - 34.9- obesity - grade I  Weight Loss:  (pt reports weight loss from " decreased intake of 27#/6mon)                         Estimated/Assessed Needs    Weight Used For Calorie Calculations: 105 kg (231 lb 7.7 oz)   Height (cm): 177.8 cm  Energy Calorie Requirements (kcal): 1761- 2113  Energy Need Method: Kremmling-St Jeor (x1- 1.2 )   RMR (Kremmling-St. Jeor Equation): 1761.25      Weight Used For Protein Calculations: 105 kg (231 lb 7.7 oz)   Protein needs: 80-100gm   Fluid Need Method: RDA Method (or per MD)    RDA Method (mL): 1761       CHO Requirement: 250 g     Assessment and Plan    Type 2 diabetes mellitus with stage 3 chronic kidney disease, with long-term current use of insulin    Nutrition problem  Altered nutrition related laboratory values    Related to (etiology):   DM    Signs and Symptoms (as evidenced by):     Recent Labs  Lab 12/30/17  0734   POCTGLUCOSE 162*       Interventions/Recommendations (treatment strategy):  See above    Nutrition Diagnosis Status:   New          Will ascertain patient interest in diabetic education, presently he is not receptive.    Monitor and Evaluation    Food and Nutrient Intake: energy intake  Food and Nutrient Adminstration: diet order  Knowledge/Beliefs/Attitudes: food and nutrition knowledge/skill        Biochemical Data, Medical Tests and Procedures: electrolyte and renal panel, glucose/endocrine profile  Nutrition-Focused Physical Findings: overall appearance, skin    Nutrition Risk    Level of Risk:  (follow one time per week)    Nutrition Follow-Up    RD Follow-up?: Yes

## 2018-01-01 NOTE — NURSING
Pt BP was 158/72 and IN of 54  re-checked after an hour BP was 150/80 manually and apical heart rate of 55, Carvedilol 12.5mg was not given. Charge nurse aware.

## 2018-01-01 NOTE — PLAN OF CARE
Problem: Patient Care Overview  Goal: Plan of Care Review  Outcome: Ongoing (interventions implemented as appropriate)   12/31/17 2221   Coping/Psychosocial   Plan Of Care Reviewed With patient       Problem: Diabetes, Type 2 (Adult)  Goal: Signs and Symptoms of Listed Potential Problems Will be Absent, Minimized or Managed (Diabetes, Type 2)  Signs and symptoms of listed potential problems will be absent, minimized or managed by discharge/transition of care (reference Diabetes, Type 2 (Adult) CPG).   Outcome: Ongoing (interventions implemented as appropriate)   12/31/17 2221   Diabetes, Type 2   Problems Assessed (Type 2 Diabetes) all   Problems Present (Type 2 Diabetes) none       Problem: Fall Risk (Adult)  Goal: Absence of Falls  Patient will demonstrate the desired outcomes by discharge/transition of care.   Outcome: Ongoing (interventions implemented as appropriate)   12/31/17 2221   Fall Risk (Adult)   Absence of Falls making progress toward outcome       Problem: Pressure Ulcer Risk (Manan Scale) (Adult,Obstetrics,Pediatric)  Goal: Skin Integrity  Patient will demonstrate the desired outcomes by discharge/transition of care.   Outcome: Ongoing (interventions implemented as appropriate)   12/31/17 2221   Pressure Ulcer Risk (Manan Scale) (Adult,Obstetrics,Pediatric)   Skin Integrity making progress toward outcome

## 2018-01-02 LAB
POCT GLUCOSE: 139 MG/DL (ref 70–110)
POCT GLUCOSE: 176 MG/DL (ref 70–110)
POCT GLUCOSE: 190 MG/DL (ref 70–110)
POCT GLUCOSE: 283 MG/DL (ref 70–110)

## 2018-01-02 PROCEDURE — 97116 GAIT TRAINING THERAPY: CPT

## 2018-01-02 PROCEDURE — 25000003 PHARM REV CODE 250: Performed by: INTERNAL MEDICINE

## 2018-01-02 PROCEDURE — 25000003 PHARM REV CODE 250: Performed by: HOSPITALIST

## 2018-01-02 PROCEDURE — 97110 THERAPEUTIC EXERCISES: CPT

## 2018-01-02 PROCEDURE — 97535 SELF CARE MNGMENT TRAINING: CPT

## 2018-01-02 PROCEDURE — 12000000 HC SNF SEMI-PRIVATE ROOM

## 2018-01-02 RX ORDER — TRAVOPROST OPHTHALMIC SOLUTION 0.04 MG/ML
1 SOLUTION OPHTHALMIC NIGHTLY
Status: DISCONTINUED | OUTPATIENT
Start: 2018-01-02 | End: 2018-01-11 | Stop reason: HOSPADM

## 2018-01-02 RX ORDER — AMLODIPINE BESYLATE 2.5 MG/1
2.5 TABLET ORAL DAILY
Status: DISCONTINUED | OUTPATIENT
Start: 2018-01-03 | End: 2018-01-11 | Stop reason: HOSPADM

## 2018-01-02 RX ORDER — TIMOLOL MALEATE 5 MG/ML
1 SOLUTION/ DROPS OPHTHALMIC DAILY
Status: DISCONTINUED | OUTPATIENT
Start: 2018-01-03 | End: 2018-01-04

## 2018-01-02 RX ADMIN — APIXABAN 2.5 MG: 2.5 TABLET, FILM COATED ORAL at 09:01

## 2018-01-02 RX ADMIN — TRAVOPROST 1 DROP: 0.04 SOLUTION/ DROPS OPHTHALMIC at 09:01

## 2018-01-02 RX ADMIN — ASPIRIN 81 MG: 81 TABLET, COATED ORAL at 08:01

## 2018-01-02 RX ADMIN — TAMSULOSIN HYDROCHLORIDE 0.4 MG: 0.4 CAPSULE ORAL at 08:01

## 2018-01-02 RX ADMIN — LOSARTAN POTASSIUM 100 MG: 50 TABLET, FILM COATED ORAL at 08:01

## 2018-01-02 RX ADMIN — APIXABAN 2.5 MG: 2.5 TABLET, FILM COATED ORAL at 08:01

## 2018-01-02 RX ADMIN — GABAPENTIN 300 MG: 300 CAPSULE ORAL at 09:01

## 2018-01-02 RX ADMIN — INSULIN ASPART 1 UNITS: 100 INJECTION, SOLUTION INTRAVENOUS; SUBCUTANEOUS at 09:01

## 2018-01-02 RX ADMIN — FUROSEMIDE 40 MG: 40 TABLET ORAL at 08:01

## 2018-01-02 RX ADMIN — COLLAGENASE SANTYL: 250 OINTMENT TOPICAL at 04:01

## 2018-01-02 NOTE — PLAN OF CARE
Problem: Physical Therapy Goal  Goal: Physical Therapy Goal  Goals to be met by: 14 days     Patient will increase functional independence with mobility by performin. Supine to sit with Modified [place]  2. Sit to supine with Modified [place]  3. Sit to stand transfer with Supervision  4. Bed to chair transfer with Supervision using Rolling Walker  5. Gait  x 150 feet with Supervision using Rolling Walker.   6. Ascend/Descend 4 inch curb step with Stand-by Assistance using Rolling Walker.  7. Stand for 3 minutes with Stand-by Assistance using Rolling Walker while performing dynamic standing activity.  8. Lower extremity exercise program x20 reps per handout, with assistance as needed  9. Pt will improve TUG time to 20s or less in order to decrease fall risk     Outcome: Ongoing (interventions implemented as appropriate)  goals remain appropriate

## 2018-01-02 NOTE — PROGRESS NOTES
01/02/2018  9:39 AM  Discharge Planning Assessment     Payor: MEDICARE / Plan: MEDICARE PART A & B / Product Type: Government /     Expected length of stay:  [] 7 days   [] 10 days  [x] 14 days   [] 21 days   [] > 30 days    Communicated expected length of stay with patient/caregiver:  [x] Yes   [] No    Anticipated discharge date:  1/11/2018    Assessment information obtained from:  [x] Patient   [] Caregiver     Arrived from:   [x] Home   [] Assisted Living    [] Nursing Home   [] SNF   [] Rehab  [] LTACH   [] Group Home   [] Foster Care   [] Psych   [] Shelter   [] Homeless   [] Transfer  [] Correctional Facility  [] Name of Facility:      Patient currently lives with:    [x] Alone   [] Spouse   [] Daughter   [] Son   [] Grandparents   []  Parents   [] Siblings   [] Friends   [] Domestic Partner   [] Facility Resident      [] Foster Home    [] Other:       Extended Emergency Contact Information  Primary Emergency Contact: Joseph Valerio Jr  Address: [address] [place] [address]  Mobile Phone: [phone]  Relation: Son     Prior to hospitalization cognitive status:   [x] Alert/Oriented  [] No Deficits [] Risk of Harm to Self/Others   [] Not Oriented to Person   [] Not Oriented to Place   [] Not Oriented to Time   [] Coma/Sedated/Intubated  [] Judgement Impaired    []  Unable to Assess   [] Inappropriate Behavior  [] Infant/Toddler    Prior to hospitalization functional status:   [x] Independent   [x] Assistive Equipment   [] Assistive Person    [] Completely Dependent  [] Infant/Toddler/Child Appropriate   [] Infant/Toddler/Child Delayed    []  Adolescent     Current cognitive status:   [x] Alert/Oriented  [] No Deficits [] Risk of Harm to Self/Others   [] Not Oriented to Person   [] Not Oriented to Place   [] Not Oriented to Time   [] Coma/Sedated/Intubated  [] Judgement Impaired    []  Unable to Assess   [] Inappropriate Behavior  [] Infant/Toddler    Current functional  status:     [] Independent   [x] Assistive Equipment   [x] Assistive Person     [] Completely Dependent   [] Infant/Toddler/Child Appropriate   [] Infant/Toddler/Child Delayed     [] Adolescent     Capacity to Care for Self:   Is patient able to return to prior living arrangements after discharge: [x] Yes  [] No     Is patient able to care for self after discharge?   [] Yes   [x] No     [] Pediatric     Does the patient have family/friends to assist after discharge?:  [x] Yes   [] No    [] N/A   Comments:  Sons      Patient/caregiver perception of discharge disposition:   [x] Home   [] Home with Family  [x] Home Health   [] SNF   [] Rehab   [] LTAC    []  New Nursing Home Placement  [] Return to Nursing Home    [] Shelter     [] Assisted Living  [] Foster Home   [] Other:      Readmit:   Has patient yehuda in the hospital in the last 30 days? [] Yes   [x] No     If YES, was patient admitted for the same reason?  [] Yes   [] No       Home Health:   Patient currently receives home health services?:   [] Yes   [x] No     Patient previously received home health services and would like to resume services if necessary   [x] Yes   [] No      If YES, name of home health provider: does not remember agency name    DME:   Patient currently uses DME:   [x] Yes   [] No        List of equipment currently used:     [] Wheelchair   [] Standard Walker  [x] Rolling Walker  []  Rollator    [] Oxygen    [] Portable oxygen   [] Nebulizer    [] Apnea Monitor    [] Crutches  [] Hospital Bed   []  Lift Device   [] Scooter [x] Cane     [] Prosthesis   []  BSC   [] Tub Bench   [] Catheter Supplies    [] Ostomy Supplies   [] Trach Supplies     [] Suction Machine        [] Home Vent    [] Bipap   [x] Other: grab bar      Medications:    Can the patient afford all prescribed medications?  [x] Yes   [] No     If NO, what medication:       Is the patient taking medications as prescribed?    [x] Yes   [] No    Financial Concerns:   Does the patient  have any financial concerns?   [] Yes   [x] No      If YES, what are the concerns:      Transportation:   Does the patient have transportation to healthcare appointments?   [x] Yes   [] No     If YES, what means of transportation does the patient have?   [x] Car   [x] Family/Friend  [] Bicycle   [] Motorcycle   [] Public Transportation [] Ambulance[] Wheelchair van   [] Name of Provider    Dialysis:   Does the patient currently receive dialysis?   [] Yes   [x] No     Thompson Stiles MD   [address]  [place] [address]  [phone]         APS/CPS involved in the case:  [] Yes   [x] No   If YES, name of :     If YES, phone number of :        Discharge Plan A:  [x] Home   [] Home with Family  [x] Home Health   [] SNF   [] Rehab   [] LTAC   []  New Nursing Home Placement  [] Return to Nursing Home    [] Assisted Living    [] Shelter     [] Private Duty Nursing   [] Foster Home    [] Psych    [] Early Steps  [] WIC       [] Home Hospice   [] Inpatient Hospice   [] Other    Discharge Plan B:  [] Home   [] Home with Family  [] Home Health   [] SNF   [] Rehab   [] LTAC  []  New Nursing Home Placement   [] Return to  Nursing Home    [] Assisted Living   [] Shelter  [] Private Duty Nursing   [] Foster Home     [] Psych     [] Early Steps  [] WIC    [] Home Hospice     [] Inpatient Hospice   [] Other     [x] Patient and family in agreement with discharge plan.    Met with patient in room to discuss discharge plan and date. Patient AAO. Discharge plan is to return home alone. Patient stated assist of sons if needed. Informed patient therapy recommends a 2 week SNF stay and that we (MD, nurse, therapy, CLARE, OSEI) meet today to review his case and come up with a discharge date. Patient stated he is ready to go now. Patient stated he can do what he's doing here at home. Patient stated using home health in the past but could not remember agency name. OSEI and CLARE will continue to follow for any  additional needs.    Kailee Horowitz RN, CM Skilled  [id number]

## 2018-01-02 NOTE — PT/OT/SLP PROGRESS
"Physical Therapy  Treatment    Joseph Valerio   MRN: [id number]   Admitting Diagnosis: Pneumonia of right lower lobe due to infectious organism    PT Received On: 01/02/18  Total Time (min): 45       Billable Minutes:45    Gait Training 15 and Therapeutic Exercise 30    Treatment Type: Treatment  PT/PTA: PTA     PTA Visit Number: 1       General Precautions: Standard, fall  Orthopedic Precautions: N/A   Braces: N/A    Do you have any cultural, spiritual, Mormon conflicts, given your current situation?: none reported    Subjective:  "feel pretty good"      Pain/Comfort  Pain Rating 1: 0/10  Pain Rating Post-Intervention 1: 0/10    Objective:   Patient found with:  (in WC)       Functional Status:  MDS G  Transfer Functional Status: CGA-Min (A)  Walk in Corridor Functional Status: CGA-Min (A)       Transfers:  Sit<>Stand: with RW CGA vcs for tech/handplacement    Gait:  Amb with RW CGA ~ 75 ft x 2, 1 std'g rest break    Therex:  2x10 reps AP,GS,LAQ,hip flex,abd/add    Balance:  CGA with RW    Additional Treatment:  LBE x 15 min    Patient left up in chair with call button in reach, PCT notified, pt needed to go to the bathroom first before getting in bed didn't want me to help wanted me to get to my next pt, ed on A for safety, and belongings in reach.    Assessment:  Joseph Valerio is a 82 y.o. male with a medical diagnosis of Pneumonia of right lower lobe due to infectious organism.  Pt tolerated well, pt would continue to benefit from skilled PT services to improve overall functional mobility, strength and endurance.  .    Rehab identified problem list/impairments: weakness, impaired endurance, impaired self care skills, impaired functional mobilty, gait instability, impaired balance    Rehab potential is good.    Activity tolerance: Fair    Discharge recommendations: home with home health     Barriers to discharge: Decreased caregiver support    Equipment recommendations: bedside commode, bath bench "     GOALS:    Physical Therapy Goals        Problem: Physical Therapy Goal    Goal Priority Disciplines Outcome Goal Variances Interventions   Physical Therapy Goal     PT/OT, PT Ongoing (interventions implemented as appropriate)     Description:  Goals to be met by: 14 days     Patient will increase functional independence with mobility by performin. Supine to sit with Modified [place]  2. Sit to supine with Modified [place]  3. Sit to stand transfer with Supervision  4. Bed to chair transfer with Supervision using Rolling Walker  5. Gait  x 150 feet with Supervision using Rolling Walker.   6. Ascend/Descend 4 inch curb step with Stand-by Assistance using Rolling Walker.  7. Stand for 3 minutes with Stand-by Assistance using Rolling Walker while performing dynamic standing activity.  8. Lower extremity exercise program x20 reps per handout, with assistance as needed  9. Pt will improve TUG time to 20s or less in order to decrease fall risk                      PLAN:    Patient to be seen  (5-6X/week)  to address the above listed problems via gait training, therapeutic activities, therapeutic exercises  Plan of Care expires: 18  Plan of Care reviewed with: patient    Laura Luca, PTA  2018

## 2018-01-02 NOTE — CLINICAL REVIEW
Clinical Pharmacy Chart Review Note      Admit Date: 12/29/2017   LOS: 4 days       Joseph Valerio is a 82 y.o. male admitted to SNF for PT/OT after hospitalization for pneumonia of right lower lobe due to infectious organism.    Active Hospital Problems    Diagnosis  POA    *Pneumonia of right lower lobe due to infectious organism [J18.1]  Yes    Arm wound, left, initial encounter [S41.102A]  Yes    Renovascular hypertension [I15.0]  Yes    Debility [R53.81]  Yes    Stage 3 chronic kidney disease [N18.3]  Yes     Chronic    Chronic anticoagulation - apixaban  [Z79.01]  Not Applicable     Chronic     For AFib      Persistent atrial fibrillation [I48.1]  Yes     Chronic    Benign prostatic hyperplasia [N40.0]  Yes     Chronic    Coronary artery disease involving native coronary artery of native heart without angina pectoris [I25.10]  Yes     Chronic    Obstructive sleep apnea on CPAP [G47.33, Z99.89]  Not Applicable     Chronic    Type 2 diabetes mellitus with stage 3 chronic kidney disease, with long-term current use of insulin [E11.22, N18.3, Z79.4]  Not Applicable     Chronic    Hyperlipidemia [E78.5]  Yes     Chronic    Venous stasis of lower extremity [I87.8]  Yes      Resolved Hospital Problems    Diagnosis Date Resolved POA   No resolved problems to display.     Review of patient's allergies indicates:  No Known Allergies  Patient Active Problem List    Diagnosis Date Noted    Arm wound, left, initial encounter 12/30/2017    Renovascular hypertension 12/30/2017    Debility 12/29/2017    Elevated troponin 12/27/2017    Pneumonia of right lower lobe due to infectious organism 12/26/2017    Biatrial enlargement 12/26/2017    Stage 3 chronic kidney disease 11/07/2017    Chronic anticoagulation - apixaban  11/07/2017    Persistent atrial fibrillation 10/16/2014    Erectile dysfunction 08/20/2012    Type 2 diabetes mellitus with stage 3 chronic kidney disease, with long-term current use of  insulin 07/31/2012    Essential hypertension 07/31/2012    Hyperlipidemia 07/31/2012    Benign prostatic hyperplasia 07/31/2012    Obstructive sleep apnea on CPAP 07/31/2012    Obesity 07/31/2012    Venous stasis of lower extremity 07/31/2012    Coronary artery disease involving native coronary artery of native heart without angina pectoris 07/31/2012    Proteinuria 07/31/2012    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       Scheduled Meds:    apixaban  2.5 mg Oral BID    aspirin  81 mg Oral Daily    carvedilol  12.5 mg Oral BID    furosemide  40 mg Oral Daily    gabapentin  300 mg Oral QHS    losartan  100 mg Oral Daily    senna-docusate 8.6-50 mg  1 tablet Oral BID    tamsulosin  0.4 mg Oral Daily     Continuous Infusions:   PRN Meds: acetaminophen, albuterol-ipratropium 2.5mg-0.5mg/3mL, calcium carbonate, dextrose 50%, dextrose 50%, glucagon (human recombinant), glucose, glucose, hydrALAZINE, insulin aspart, lactulose, ondansetron, oxyCODONE, oxyCODONE, ramelteon    OBJECTIVE:     Vital Signs (Last 24H)  Temp:  [97.6 °F (36.4 °C)-98.3 °F (36.8 °C)]   Pulse:  [54-57]   Resp:  [18]   BP: (140-145)/(67-68)   SpO2:  [96 %-98 %]     Laboratory:  CBC:   Recent Labs  Lab 12/27/17  0401 12/28/17  0800 12/29/17  1122   WBC 10.61 6.31 6.92   RBC 3.43* 3.13* 3.26*   HGB 9.3* 8.5* 8.8*   HCT 31.3* 27.9* 28.4*    193 231   MCV 91 89 87   MCH 27.1 27.2 27.0   MCHC 29.7* 30.5* 31.0*     BMP:   Recent Labs  Lab 12/27/17  0401 12/28/17  0800 12/29/17  1150   * 127* 179*    136 142   K 4.3 3.8 4.2    108 109   CO2 27 24 25   BUN 44* 44* 41*   CREATININE 2.8* 2.3* 1.6*   CALCIUM 8.3* 8.1* 8.1*   MG 1.9 1.9  --      CMP:   Recent Labs  Lab 12/27/17  0401 12/28/17  0800 12/29/17  1150   * 127* 179*   CALCIUM 8.3* 8.1* 8.1*   ALBUMIN  --   --  2.2*   PROT  --   --  5.5*    136 142   K 4.3 3.8 4.2   CO2 27 24 25    108 109   BUN 44* 44* 41*   CREATININE 2.8* 2.3* 1.6*    ALKPHOS  --   --  61   ALT  --   --  13   AST  --   --  9*   BILITOT  --   --  0.6     LFTs:   Recent Labs  Lab 12/29/17  1150   ALT 13   AST 9*   ALKPHOS 61   BILITOT 0.6   PROT 5.5*   ALBUMIN 2.2*     Coagulation: No results for input(s): PT, INR, APTT in the last 168 hours.  Cardiac markers: No results for input(s): CKMB, TROPONINT, MYOGLOBIN in the last 168 hours.  ABGs: No results for input(s): PH, PCO2, PO2, HCO3, POCSATURATED, BE in the last 168 hours.  Microbiology Results (last 7 days)     ** No results found for the last 168 hours. **        Specimen (12h ago through future)    None        No results for input(s): COLORU, CLARITYU, SPECGRAV, PHUR, PROTEINUA, GLUCOSEU, BILIRUBINCON, BLOODU, WBCU, RBCU, BACTERIA, MUCUS, NITRITE, LEUKOCYTESUR, UROBILINOGEN, HYALINECASTS in the last 168 hours.  Others: No results for input(s): XIOBSFBD38BJ, TSH, T4FREE, LABLIPI in the last 168 hours.    Invalid input(s): A1C      ASSESSMENT/PLAN:     Active Hospital Problems    Diagnosis    *Pneumonia of right lower lobe due to infectious organism   --Antibiotics completed  --Albuterol-ipratropium 3 ml per neb q4h prn wheezing    Debility  --PT/OT: Gabapentin 300 mg nightly; APAP 650 mg q6h prn mild pain; Oxycodone 5 mg q6h prn moderate pain, 10 mg q6h prn severe pain  --bowel regimen for constipation; hold for loose or frequent stools: Senna-docusate twice daily; Lactulose 15gm q6h prn constipation  --DVT prophylaxis: Apixaban 2.5 mg twice daily      Renovascular hypertension   **Monitor BP and pulse  --Carvedilol 12.5 mg twice daily  --Furosemide 40 mg daily (monitor BMP)  --Losartan 100 mg daily  --Hydralazine 25 mg q8h prn SBP>170  BP Readings from Last 3 Encounters:   01/02/18 (!) 140/68   12/29/17 (!) 162/75   12/20/17 136/88     Pulse Readings from Last 3 Encounters:   01/02/18 (!) 54   12/29/17 64   12/20/17 (!) 58         BMP  Lab Results   Component Value Date     12/29/2017    K 4.2 12/29/2017      12/29/2017    CO2 25 12/29/2017    BUN 41 (H) 12/29/2017    CREATININE 1.6 (H) 12/29/2017    CALCIUM 8.1 (L) 12/29/2017    ANIONGAP 8 12/29/2017    ESTGFRAFRICA 46 (A) 12/29/2017    EGFRNONAA 40 (A) 12/29/2017     .    Arm wound, left, initial encounter  --Wound care as prescribed    Stage 3 chronic kidney disease   **Monitor renal function and adjust medications accordingly  1/2/2018 Estimated Creatinine Clearance: 43.5 mL/min (based on SCr of 1.6 mg/dL (H)).      Chronic anticoagulation - apixaban      Persistent atrial fibrillation   **Monitor HR  --Apixaban 2.5 mg twice daily  --Carvedilol  Pulse Readings from Last 3 Encounters:   01/02/18 (!) 54   12/29/17 64   12/20/17 (!) 58         Benign prostatic hyperplasia  --Tamsulosin 0.4 mg daily    Coronary artery disease involving native coronary artery of native heart without angina pectoris   --ASA 81 mg daily    Obstructive sleep apnea on CPAP  --Dose not use CPAP at home  --O2 as needed    Type 2 diabetes mellitus with stage 3 chronic kidney disease, with long-term current use of insulin   **Monitor blood glucose  --SSI  Lab Results   Component Value Date    HGBA1C 5.6 12/13/2017     POCT Glucose   Date Value Ref Range Status   01/02/2018 139 (H) 70 - 110 mg/dL Final   01/01/2018 213 (H) 70 - 110 mg/dL Final   01/01/2018 177 (H) 70 - 110 mg/dL Final   01/01/2018 203 (H) 70 - 110 mg/dL Final   01/01/2018 153 (H) 70 - 110 mg/dL Final   12/31/2017 191 (H) 70 - 110 mg/dL Final   12/31/2017 166 (H) 70 - 110 mg/dL Final   12/31/2017 251 (H) 70 - 110 mg/dL Final   12/31/2017 158 (H) 70 - 110 mg/dL Final   12/30/2017 209 (H) 70 - 110 mg/dL Final   12/30/2017 183 (H) 70 - 110 mg/dL Final   12/30/2017 170 (H) 70 - 110 mg/dL Final         Hyperlipidemia   --No medications ordered at this time, patient states he was taking Lipitor at home but lipid panel indicates low lipid levels  Lab Results   Component Value Date    CHOL 102 (L) 12/13/2017    CHOL 110 (L)  08/23/2017    CHOL 97 (L) 12/12/2016     Lab Results   Component Value Date    HDL 25 (L) 12/13/2017    HDL 23 (L) 08/23/2017    HDL 27 (L) 12/12/2016     Lab Results   Component Value Date    LDLCALC 60.0 (L) 12/13/2017    LDLCALC 61.2 (L) 08/23/2017    LDLCALC 51.8 (L) 12/12/2016     Lab Results   Component Value Date    TRIG 85 12/13/2017    TRIG 129 08/23/2017    TRIG 91 12/12/2016     Lab Results   Component Value Date    CHOLHDL 24.5 12/13/2017    CHOLHDL 20.9 08/23/2017    CHOLHDL 27.8 12/12/2016     Lab Results   Component Value Date    ALT 13 12/29/2017    AST 9 (L) 12/29/2017    ALKPHOS 61 12/29/2017    BILITOT 0.6 12/29/2017         Venous stasis of lower extremity  --CHARO zendejase   **Recommend restarting home eye drops, Travatan Z and Timolol          I have reviewed the medications in compliance with CMS Regulation F329 of the [place] Appendix PP.      Yolie Levy, Pharm. D.  Clinical Pharmacist  [facility]

## 2018-01-02 NOTE — PLAN OF CARE
Problem: Patient Care Overview  Goal: Plan of Care Review  Outcome: Ongoing (interventions implemented as appropriate)   01/01/18 2144   Coping/Psychosocial   Plan Of Care Reviewed With patient       Problem: Diabetes, Type 2 (Adult)  Goal: Signs and Symptoms of Listed Potential Problems Will be Absent, Minimized or Managed (Diabetes, Type 2)  Signs and symptoms of listed potential problems will be absent, minimized or managed by discharge/transition of care (reference Diabetes, Type 2 (Adult) CPG).   Outcome: Ongoing (interventions implemented as appropriate)   01/01/18 2144   Diabetes, Type 2   Problems Assessed (Type 2 Diabetes) all   Problems Present (Type 2 Diabetes) hyperglycemia       Problem: Fall Risk (Adult)  Goal: Absence of Falls  Patient will demonstrate the desired outcomes by discharge/transition of care.   Outcome: Ongoing (interventions implemented as appropriate)   01/01/18 2144   Fall Risk (Adult)   Absence of Falls making progress toward outcome       Problem: Pressure Ulcer Risk (Manan Scale) (Adult,Obstetrics,Pediatric)  Goal: Skin Integrity  Patient will demonstrate the desired outcomes by discharge/transition of care.   Outcome: Ongoing (interventions implemented as appropriate)   01/01/18 2144   Pressure Ulcer Risk (Manan Scale) (Adult,Obstetrics,Pediatric)   Skin Integrity making progress toward outcome

## 2018-01-02 NOTE — PROGRESS NOTES
01/02/2018  2:44 PM    Discharge Planning-Met with patient in room to inform of discharge date of 1/11/2018. Discharge date written on dry erase board in room. Patient stated understanding to discharge date.  Kailee Horowitz RN, CM Skilled  [id number]

## 2018-01-03 PROBLEM — J18.9 PNEUMONIA OF RIGHT LOWER LOBE DUE TO INFECTIOUS ORGANISM: Status: RESOLVED | Noted: 2017-12-26 | Resolved: 2018-01-03

## 2018-01-03 LAB
POCT GLUCOSE: 141 MG/DL (ref 70–110)
POCT GLUCOSE: 155 MG/DL (ref 70–110)
POCT GLUCOSE: 175 MG/DL (ref 70–110)

## 2018-01-03 PROCEDURE — 25000003 PHARM REV CODE 250: Performed by: HOSPITALIST

## 2018-01-03 PROCEDURE — 25000003 PHARM REV CODE 250: Performed by: NURSE PRACTITIONER

## 2018-01-03 PROCEDURE — 97110 THERAPEUTIC EXERCISES: CPT

## 2018-01-03 PROCEDURE — 97530 THERAPEUTIC ACTIVITIES: CPT

## 2018-01-03 PROCEDURE — 12000000 HC SNF SEMI-PRIVATE ROOM

## 2018-01-03 PROCEDURE — 97116 GAIT TRAINING THERAPY: CPT

## 2018-01-03 PROCEDURE — 99309 SBSQ NF CARE MODERATE MDM 30: CPT | Mod: ,,, | Performed by: NURSE PRACTITIONER

## 2018-01-03 PROCEDURE — 25000003 PHARM REV CODE 250: Performed by: INTERNAL MEDICINE

## 2018-01-03 RX ORDER — HYDRALAZINE HYDROCHLORIDE 10 MG/1
10 TABLET, FILM COATED ORAL EVERY 8 HOURS
Status: DISCONTINUED | OUTPATIENT
Start: 2018-01-03 | End: 2018-01-11 | Stop reason: HOSPADM

## 2018-01-03 RX ADMIN — APIXABAN 2.5 MG: 2.5 TABLET, FILM COATED ORAL at 09:01

## 2018-01-03 RX ADMIN — HYDRALAZINE HYDROCHLORIDE 10 MG: 10 TABLET, FILM COATED ORAL at 09:01

## 2018-01-03 RX ADMIN — COLLAGENASE SANTYL: 250 OINTMENT TOPICAL at 08:01

## 2018-01-03 RX ADMIN — TIMOLOL MALEATE 1 DROP: 5 SOLUTION OPHTHALMIC at 08:01

## 2018-01-03 RX ADMIN — LOSARTAN POTASSIUM 100 MG: 50 TABLET, FILM COATED ORAL at 08:01

## 2018-01-03 RX ADMIN — HYDRALAZINE HYDROCHLORIDE 10 MG: 10 TABLET, FILM COATED ORAL at 03:01

## 2018-01-03 RX ADMIN — TRAVOPROST 1 DROP: 0.04 SOLUTION/ DROPS OPHTHALMIC at 09:01

## 2018-01-03 RX ADMIN — ASPIRIN 81 MG: 81 TABLET, COATED ORAL at 08:01

## 2018-01-03 RX ADMIN — AMLODIPINE BESYLATE 2.5 MG: 2.5 TABLET ORAL at 08:01

## 2018-01-03 RX ADMIN — APIXABAN 2.5 MG: 2.5 TABLET, FILM COATED ORAL at 08:01

## 2018-01-03 RX ADMIN — GABAPENTIN 300 MG: 300 CAPSULE ORAL at 09:01

## 2018-01-03 RX ADMIN — CARVEDILOL 12.5 MG: 12.5 TABLET, FILM COATED ORAL at 08:01

## 2018-01-03 RX ADMIN — TAMSULOSIN HYDROCHLORIDE 0.4 MG: 0.4 CAPSULE ORAL at 08:01

## 2018-01-03 RX ADMIN — FUROSEMIDE 40 MG: 40 TABLET ORAL at 08:01

## 2018-01-03 NOTE — PLAN OF CARE
Problem: Physical Therapy Goal  Goal: Physical Therapy Goal  Goals to be met by: 14 days     Patient will increase functional independence with mobility by performin. Supine to sit with Modified [place]  2. Sit to supine with Modified [place]  3. Sit to stand transfer with Supervision  4. Bed to chair transfer with Supervision using Rolling Walker  5. Gait  x 150 feet with Supervision using Rolling Walker.   6. Ascend/Descend 4 inch curb step with Stand-by Assistance using Rolling Walker.  7. Stand for 3 minutes with Stand-by Assistance using Rolling Walker while performing dynamic standing activity.  8. Lower extremity exercise program x20 reps per handout, with assistance as needed  9. Pt will improve TUG time to 20s or less in order to decrease fall risk     Outcome: Ongoing (interventions implemented as appropriate)  LTGs remain appropriate. Pt will continue PT POC.    Jo-Ann Pelayo, PT  1/3/2018

## 2018-01-03 NOTE — PLAN OF CARE
Problem: Diabetes, Type 2 (Adult)  Goal: Signs and Symptoms of Listed Potential Problems Will be Absent, Minimized or Managed (Diabetes, Type 2)  Signs and symptoms of listed potential problems will be absent, minimized or managed by discharge/transition of care (reference Diabetes, Type 2 (Adult) CPG).    01/03/18 0155   Diabetes, Type 2   Problems Assessed (Type 2 Diabetes) all   Problems Present (Type 2 Diabetes) hyperglycemia

## 2018-01-03 NOTE — ASSESSMENT & PLAN NOTE
wound care as prescribed     1/3/18  Seen by wound care this morning.    Appreciate recs.  Wound care to continue to following biweekly.

## 2018-01-03 NOTE — PROGRESS NOTES
Consult received on patient. Skin tear noted to patient's right forearm, unstageable pressure injury noted to patient's right posterior shoulder, and evolving deep tissue pressure injury with partial thickness skin loss noted to patient's buttocks. See flow sheet and photographs below.   Recommendations:  Skin tear right forearm: weekly-clean with normal saline, dress with mepilex foam dressing.  unstageable pressure injury to right posterior shoulder: weekly- clean with normal saline, dress with hydrocolloid dressing (hydrocolloid will assist with autolytic debridement)  Evolving deep tissue pressure injury buttocks: recommend apply zinc barrier cream qshift and as needed. Remove only soiled cream then reapply more.  Wheel chair cushion and low air loss overlay in use.  Educated patient on turning every 2hrs in bed and readjusting every 30 minutes to 1 hour while in chair. Patient verbalized understanding.  Updated Dr. Eric and nursing on recommendations. Orders placed for nursing.       01/03/18 1025       Wound 12/29/17 1800 Skin tear lower arm   Date First Assessed/Time First Assessed: 12/29/17 1800   Pre-existing: Yes  Wound Type: Skin tear  Side: Right  Orientation: lower  Location: arm  Wound Length (cm): 6  Wound Width (cm): 2.5   Wound WDL ex   Drainage Amount none   Drainage Characteristics/Odor no odor   Wound Base moist;pink   Periwound Area ecchymotic   Wound Edges open   Wound Length (cm) 2   Wound Width (cm) 2   Depth (cm) 0   Non-staged Wound Description Partial thickness   Dressing foam   Dressing Change Due 01/10/18       Pressure Ulcer 12/27/17 1915 sacral spine suspected deep tissue injury   Date First Assessed/Time First Assessed: 12/27/17 1915   Pressure Ulcer Present on Admission: other (see comments)  Location: sacral spine  Staging: suspected deep tissue injury  Wound Length (cm): 10  Wound Width (cm): 14   Wound Image    Staging suspected deep tissue injury  (evolving DTI with partial  thickness skin loss)   Pressure Ulcer Risk Factors activity;mobility;shear/friction;moisture   Drainage Amount scant   Drainage Characteristics/Odor serous;no odor   Appearance reddened;maroon;moist;pink   Periwound Area (darkened discolored wound edges)   Wound Edges open   Wound Length (cm) 8   Wound Width (cm) 6   Depth (cm) 0.1   Interventions barrier applied       Pressure Ulcer 12/29/17 1730 Right shoulder Unstageable   Date First Assessed/Time First Assessed: 12/29/17 1730   Pressure Ulcer Present on Admission: yes  Side: Right  Location: shoulder  Staging: Unstageable  Wound Length (cm): 2  Wound Width (cm): 4   Wound Image    Staging Unstageable   Pressure Ulcer Risk Factors activity;mobility   Drainage Amount none   Drainage Characteristics/Odor no odor   Appearance slough;moist;pink  (80% slough covering)   Periwound Area redness   Wound Edges open   Wound Length (cm) 3   Wound Width (cm) 2   Depth (cm) 0.1   Dressing Dressing applied;hydrocolloid   Dressing Change Due 01/10/18   Skin Interventions   Pressure Reduction Devices pressure-redistributing mattress utilized;chair cushion utilized  (wheel chair cushion and low air loss overlay)

## 2018-01-03 NOTE — HOSPITAL COURSE
1/3/18  Patient seen for first time.  He has no complaints of cough, SOB or pain at this time.  He expresses desire to go home soon.      1/11/18  Patient seen at bedside, he states he is ready to go home.  He has no complaints at this time.

## 2018-01-03 NOTE — ASSESSMENT & PLAN NOTE
Cont with PT/OT for gait training and strengthening and restoration of ADL's   Fall precautions   Cont DVT prophylaxis with apixaban   Anticoagulants   Medication Route Frequency    apixaban tablet 2.5 mg Oral BID     1/3/18  Continue therapy to improved functional strength.  Per PT last note still has potential to move to a SBA level.

## 2018-01-03 NOTE — PT/OT/SLP PROGRESS
"Physical Therapy  Treatment    Joseph Valerio   MRN: [id number]   Admitting Diagnosis: Pneumonia of right lower lobe due to infectious organism    PT Received On: 01/03/18  Total Time (min): 54       Billable Minutes:  Gait Training 14, Therapeutic Activity 15, Therapeutic Exercise 25 and Total Time 54    Treatment Type: Treatment  PT/PTA: PT     PTA Visit Number: 0       General Precautions: Standard, fall  Orthopedic Precautions: N/A   Braces: N/A    Do you have any cultural, spiritual, Taoism conflicts, given your current situation?: none reported    Subjective:  Communicated with pt and nursing prior to session.  Pt agreeable to session.    Pain/Comfort  Pain Rating 1: 8/10 (w/ ambulation only)  Location - Side 1: Bilateral  Location - Orientation 1: generalized  Location 1: knee  Pain Addressed 1: Pre-medicate for activity, Reposition  Pain Rating Post-Intervention 1: 0/10 (0/10 at rest)    Objective:  Patient found sitting EOB       Functional Status:  MDS G  Transfer Functional Status: S-SBA  Walk in Room Functional Status: CGA-Min (A)  Walk in Corridor Functional Status: CGA-Min (A)  Locomotion on Unit Functional Status: CGA-Min (A)  Dressing Functional Status: 1: S-SBA (donning tennis shoes at EOB)    Bed Mobility:  Not Performed    Transfers:  Sit<>Stand: to/from w/c (multiple trials) all w/ RW and SBA for safety  Stand Pivot Transfer: EOB>w/c w/ RW and SBA for safety  Cueing for hand placement    Gait:  Amb 4 trials (75ft, 75ft, 67ft, and 67ft) w/ RW and CGA for safety, cueing for upright posture and to remain inside RW, limited in distance by fatigue     Advanced Gait:  Curb Step: asc/amy 4" curb (2 trials) w/ RW and CGA for safety, cueing to step close to curb prior to lifting ROM    Therex:  Seated BLE therex 2x10-15 reps (HF, LAQ, AP, GS)    Balance:  Standing beach ball tap x2min w/ RW and SBA for safety, limited in time by fatigue, pt slightly unsteady t/o trial    Additional Treatment:  Seated " LBE x15min to inc BLE strength and endurance    Patient left up in chair with call button in reach.    Assessment:  Joseph Valerio is a 82 y.o. male with a medical diagnosis of Pneumonia of right lower lobe due to infectious organism.  Pt he session well w/ good participation. He is progressing towards a SBA level for functional mobility but continues to be limited by decreased endurance and strength. He will continue to benefit from skilled PT.    Rehab identified problem list/impairments: weakness, impaired endurance, impaired self care skills, impaired functional mobilty, gait instability, impaired balance    Rehab potential is good.    Activity tolerance: Good    Discharge recommendations: home with home health     Barriers to discharge: Decreased caregiver support    Equipment recommendations: bedside commode, bath bench     GOALS:    Physical Therapy Goals        Problem: Physical Therapy Goal    Goal Priority Disciplines Outcome Goal Variances Interventions   Physical Therapy Goal     PT/OT, PT Ongoing (interventions implemented as appropriate)     Description:  Goals to be met by: 14 days     Patient will increase functional independence with mobility by performin. Supine to sit with Modified Lane  2. Sit to supine with Modified Lane  3. Sit to stand transfer with Supervision  4. Bed to chair transfer with Supervision using Rolling Walker  5. Gait  x 150 feet with Supervision using Rolling Walker.   6. Ascend/Descend 4 inch curb step with Stand-by Assistance using Rolling Walker.  7. Stand for 3 minutes with Stand-by Assistance using Rolling Walker while performing dynamic standing activity.  8. Lower extremity exercise program x20 reps per handout, with assistance as needed  9. Pt will improve TUG time to 20s or less in order to decrease fall risk                      PLAN:    Patient to be seen  (5-6X/week)  to address the above listed problems via gait training, therapeutic  activities, therapeutic exercises  Plan of Care expires: 01/29/18  Plan of Care reviewed with: patient    Jo-Ann AGGARWAL Pierce, PT  01/03/2018

## 2018-01-03 NOTE — PLAN OF CARE
Problem: Occupational Therapy Goal  Goal: Occupational Therapy Goal  Goals to be met by: 14 days    Patient will increase functional independence with ADLs by performing:    UE Dressing with [place].  LE Dressing with Modified [place].  Grooming while standing with Modified [place].  Toileting from toilet with Modified [place] for hygiene and clothing management.   Bathing from  shower chair/bench with Modified [place].  Toilet transfer to toilet with Modified [place].  Standing tolerance 10 minutes to complete standing ADL and IADL tasks.     Outcome: Ongoing (interventions implemented as appropriate)  Patient's goals are appropriate.  MILLER Jarvis  1/3/2018

## 2018-01-03 NOTE — PT/OT/SLP PROGRESS
Occupational Therapy  Treatment    Joseph Valerio   MRN: [id number]   Admitting Diagnosis: Pneumonia of right lower lobe due to infectious organism    OT Date of Treatment: 01/03/18  Total Time (min): 53 min    Billable Minutes:  Therapeutic Activity 23 and Therapeutic Exercise 30    General Precautions: Standard, fall  Orthopedic Precautions: N/A  Braces: N/A    Do you have any cultural, spiritual, Christianity conflicts, given your current situation?: no    Subjective:  Communicated with patient prior to session.    Pain/Comfort  Pain Rating 1: 7/10  Location - Side 1: Bilateral  Location - Orientation 1: generalized  Location 1: knee  Pain Addressed 1: Reposition, Distraction  Pain Rating Post-Intervention 1: 7/10    Objective:       Functional Status:  MDS G  Bed Mobility Functional Status: S supine>sit  Transfer Functional Status: S bed>w/c stand pivot   Dressing Functional Status: 3: Max(A) Donning shoes      OT Exercises: B UE ROM exercises using 2# dowel princess 3 x 10 (chest press, front rows, back rows, and shoulder flexion) focusing to improve strength and endurance to increase independence with ADLs.     Additional Treatment:  Patient performed a visual perception activity matching a pattern using pegs and pegboard while standing for activity tolerance /c supervision in order to stand to complete personal hygiene tasks.    Patient was returned to room by rehab technician with all needs met.     ASSESSMENT:  Joseph Valerio is a 82 y.o. male with a medical diagnosis of Pneumonia of right lower lobe due to infectious organism and presents with the deficits listed below. Patient tolerated treatment session and was motivated to complete tasks. Patient did need frequent rest breaks during standing task. Patient continues to benefit from skilled OT services to achieve maximal independence.    Rehab identified problem list/impairments: weakness, impaired endurance, impaired self care skills, impaired functional  mobilty, gait instability, impaired balance, decreased safety awareness, edema    Rehab potential is good    Activity tolerance: Good    Discharge recommendations: home with home health     Barriers to discharge: Decreased caregiver support     Equipment recommendations: bedside commode, shower chair     GOALS:    Occupational Therapy Goals        Problem: Occupational Therapy Goal    Goal Priority Disciplines Outcome Interventions   Occupational Therapy Goal     OT, PT/OT Ongoing (interventions implemented as appropriate)    Description:  Goals to be met by: 14 days    Patient will increase functional independence with ADLs by performing:    UE Dressing with [place].  LE Dressing with Modified [place].  Grooming while standing with Modified [place].  Toileting from toilet with Modified [place] for hygiene and clothing management.   Bathing from  shower chair/bench with Modified [place].  Toilet transfer to toilet with Modified [place].  Standing tolerance 10 minutes to complete standing ADL and IADL tasks.                      Plan:  Patient to be seen 5 x/week to address the above listed problems via self-care/home management, therapeutic activities, therapeutic exercises  Plan of Care reviewed with: patient    MILLER Jarvis  01/03/2018

## 2018-01-03 NOTE — ASSESSMENT & PLAN NOTE
rate controlled on carvedilol  continue apixaban     1/3/18  Continue Coreg and apixaban as outline in MD note, no acute issues.

## 2018-01-03 NOTE — ASSESSMENT & PLAN NOTE
no acute issues  continue medical issues ASA, losartan, carvedilol, and furosemide     1/3/18  No acute issues.  Continue ASA, ARB, B-blocker and lasix as ordered by MD.  Denies chest pains at this time.

## 2018-01-03 NOTE — SUBJECTIVE & OBJECTIVE
Interval History:   1/3/18  Patient seen for first time.  He has no complaints of cough, SOB or pain at this time.  He expresses desire to go home soon.          Review of Systems   Constitutional: Negative for appetite change, chills, fatigue and fever.   Respiratory: Negative for cough and shortness of breath.    Cardiovascular: Negative for chest pain, palpitations and leg swelling.   Gastrointestinal: Negative for abdominal pain, constipation, diarrhea, nausea and vomiting.   Endocrine: Negative for cold intolerance and heat intolerance.   Genitourinary: Negative for difficulty urinating and hematuria.   Musculoskeletal: Negative for arthralgias.   Skin: Negative for rash.   Neurological: Positive for weakness.   Psychiatric/Behavioral: Negative for confusion.     Objective:     Vital Signs (Most Recent):  Temp: 98.4 °F (36.9 °C) (01/03/18 0715)  Pulse: (!) 52 (01/03/18 1006)  Resp: 20 (01/03/18 0715)  BP: (!) 159/71 (01/03/18 1006)  SpO2: 99 % (01/03/18 0715) Vital Signs (24h Range):  Temp:  [97.3 °F (36.3 °C)-98.4 °F (36.9 °C)] 98.4 °F (36.9 °C)  Pulse:  [52-60] 52  Resp:  [20] 20  SpO2:  [97 %-99 %] 99 %  BP: (154-179)/(71-80) 159/71     Weight: 103 kg (227 lb 1.2 oz)  Body mass index is 32.58 kg/m².    Intake/Output Summary (Last 24 hours) at 01/03/18 1037  Last data filed at 01/02/18 1700   Gross per 24 hour   Intake              400 ml   Output                0 ml   Net              400 ml      Physical Exam   Constitutional: He is oriented to person, place, and time. He appears well-developed and well-nourished.   Cardiovascular: Normal rate, regular rhythm, normal heart sounds and intact distal pulses.    No murmur heard.  Pulmonary/Chest: Effort normal and breath sounds normal. No respiratory distress.   Abdominal: Soft. Bowel sounds are normal. He exhibits no distension. There is no tenderness.   Musculoskeletal: Normal range of motion. He exhibits edema. He exhibits no tenderness.   Neurological: He  is alert and oriented to person, place, and time.   Skin: Skin is warm and dry. Capillary refill takes less than 2 seconds.   Chronic lower extremity skin discoloration reports secondary to diabetes.     Psychiatric: He has a normal mood and affect.       Significant Labs:   A1C:   Recent Labs  Lab 08/23/17  0716 12/13/17  1000   HGBA1C 6.0* 5.6     BMP: No results for input(s): GLU, NA, K, CL, CO2, BUN, CREATININE, CALCIUM, MG in the last 48 hours.  CBC: No results for input(s): WBC, HGB, HCT, PLT in the last 48 hours.  POCT Glucose:   Recent Labs  Lab 01/02/18  1642 01/02/18  2037 01/03/18  0722   POCTGLUCOSE 190* 283* 155*       Significant Imaging: n/a

## 2018-01-03 NOTE — CLINICAL REVIEW
Spoke to son Joseph Valerio Jr to update on plan of care.  All questions answered to his satisfaction.

## 2018-01-03 NOTE — ASSESSMENT & PLAN NOTE
Low Na diet  CHARO hose  Leg elevation     1/3/18  Chronic, continue medical plan outlined by MD.    Patient to follow up with treating provider after discharge, no acute issues.

## 2018-01-03 NOTE — PROGRESS NOTES
[facility]  Department of Hospital Medicine  Progress Note    Patient Name: Joseph Valerio  MRN: [id number]  Code Status: Full Code  Admission Date: 12/29/2017  Length of Stay: 5 days  Attending Physician: Jaqueline Rodarte MD  Primary Care Provider: Thompson Stiles MD    Subjective:     Principal Problem:Pneumonia of right lower lobe due to infectious organism    HPI:  Chief Complaint/Reason for Admission: Debility    History of Present Illness:  Patient is a 82 y.o. male with PMH of  Aortic sclerosis; BPH; CAD;  Chronic allergic rhinitis; Chronic anticoagulation - apixaban ; Chronic atrial fibrillation; CKD stage III; CPAP dependence; HLD; Hyperprolactinemia; Hypertension; Lumbar disc disease; Male hypogonadism; Obesity; Peripheral vascular disease; Pituitary microadenoma; Pulmonary hypertension; Renovascular hypertension; Sleep apnea; Traumatic rhabdomyolysis; Type 2 diabetes mellitus with stage 3 chronic kidney disease, with long-term current use of insulin; and Venous insufficiency. He was admitted for respiratory distress and altered mental status. Sats on admit were in the 80s. He has a history of tobacco use of 3.5 packs per day x 30 years and had worked as a  with asbestos exposure. He had been followed by his PCP and pulmonary and a CT chest earlier this month showed tree in bud nodularity and inflammation. He was seen by pulmonary in July but did not follow up. During his hospitalization he was treated for RLL PNA with augmentin and had clinical improvement. He had JOHN PAUL on CKD 3 but it resolved back to baseline, and a slight troponemia which cardiology attributed to demand ischemia as he was hypertensive on presentation with SBP in the 240s. He was recently hospitalized in November for rhabdomyolysis and was discharged home after a short stay. Patient has been working with PT/OT who recommend SNF for further balance/mobility training.         Interval History:   1/3/18  Patient  seen for first time.  He has no complaints of cough, SOB or pain at this time.  He expresses desire to go home soon.          Review of Systems   Constitutional: Negative for appetite change, chills, fatigue and fever.   Respiratory: Negative for cough and shortness of breath.    Cardiovascular: Negative for chest pain, palpitations and leg swelling.   Gastrointestinal: Negative for abdominal pain, constipation, diarrhea, nausea and vomiting.   Endocrine: Negative for cold intolerance and heat intolerance.   Genitourinary: Negative for difficulty urinating and hematuria.   Musculoskeletal: Negative for arthralgias.   Skin: Negative for rash.   Neurological: Positive for weakness.   Psychiatric/Behavioral: Negative for confusion.     Objective:     Vital Signs (Most Recent):  Temp: 98.4 °F (36.9 °C) (01/03/18 0715)  Pulse: (!) 52 (01/03/18 1006)  Resp: 20 (01/03/18 0715)  BP: (!) 159/71 (01/03/18 1006)  SpO2: 99 % (01/03/18 0715) Vital Signs (24h Range):  Temp:  [97.3 °F (36.3 °C)-98.4 °F (36.9 °C)] 98.4 °F (36.9 °C)  Pulse:  [52-60] 52  Resp:  [20] 20  SpO2:  [97 %-99 %] 99 %  BP: (154-179)/(71-80) 159/71     Weight: 103 kg (227 lb 1.2 oz)  Body mass index is 32.58 kg/m².    Intake/Output Summary (Last 24 hours) at 01/03/18 1037  Last data filed at 01/02/18 1700   Gross per 24 hour   Intake              400 ml   Output                0 ml   Net              400 ml      Physical Exam   Constitutional: He is oriented to person, place, and time. He appears well-developed and well-nourished.   Cardiovascular: Normal rate, regular rhythm, normal heart sounds and intact distal pulses.    No murmur heard.  Pulmonary/Chest: Effort normal and breath sounds normal. No respiratory distress.   Abdominal: Soft. Bowel sounds are normal. He exhibits no distension. There is no tenderness.   Musculoskeletal: Normal range of motion. He exhibits edema. He exhibits no tenderness.   Neurological: He is alert and oriented to person,  place, and time.   Skin: Skin is warm and dry. Capillary refill takes less than 2 seconds.   Chronic lower extremity skin discoloration reports secondary to diabetes.     Psychiatric: He has a normal mood and affect.       Significant Labs:   A1C:   Recent Labs  Lab 08/23/17  0716 12/13/17  1000   HGBA1C 6.0* 5.6     BMP: No results for input(s): GLU, NA, K, CL, CO2, BUN, CREATININE, CALCIUM, MG in the last 48 hours.  CBC: No results for input(s): WBC, HGB, HCT, PLT in the last 48 hours.  POCT Glucose:   Recent Labs  Lab 01/02/18  1642 01/02/18  2037 01/03/18  0722   POCTGLUCOSE 190* 283* 155*       Significant Imaging: n/a    Assessment/Plan:      * Pneumonia of right lower lobe due to infectious organism-resolved as of 1/3/2018    complete course of augmentin as prescribed  monitor sats  leukocytosis resolved; afebrile    1/3/18  No febrile, last WBC is normal and has completed course of antibiotics per MD note.   Condition has resolved.        Renovascular hypertension    BP elevated  BP goal of < 140/90  continue carvedilol, furosemide, and losartan but titrate as needed   hydralazine as needed     1/3/18  BP this morning is 159/71 and HR is 52.  Per MD note, goal BP < 140/90  Currently on Coreg, Lasix and Losartan scheduled and Hydralazine PRN.    Unable to increase coreg due to low HR and Losartan is at max dosing.  Will scheduled Hydralazine and follow.        Arm wound, left, initial encounter    wound care as prescribed     1/3/18  Seen by wound care this morning.    Appreciate recs.  Wound care to continue to following biweekly.        Debility    Cont with PT/OT for gait training and strengthening and restoration of ADL's   Fall precautions   Cont DVT prophylaxis with apixaban   Anticoagulants   Medication Route Frequency    apixaban tablet 2.5 mg Oral BID     1/3/18  Continue therapy to improved functional strength.  Per PT last note still has potential to move to a SBA level.        Chronic  anticoagulation - apixaban     Plan as above    1/3/18  No acute issues.  Continue treatment with Apixaban.        Stage 3 chronic kidney disease    creatinine at baseline  avoid nephrotoxic agents     1/3/18  Last S. Cr is 1.6.  Continue to monitor biweekly labs.        Persistent atrial fibrillation    rate controlled on carvedilol  continue apixaban     1/3/18  Continue Coreg and apixaban as outline in MD note, no acute issues.        Coronary artery disease involving native coronary artery of native heart without angina pectoris    no acute issues  continue medical issues ASA, losartan, carvedilol, and furosemide     1/3/18  No acute issues.  Continue ASA, ARB, B-blocker and lasix as ordered by MD.  Denies chest pains at this time.        Venous stasis of lower extremity    Low Na diet  CHARO hose  Leg elevation     1/3/18  Chronic, continue medical plan outlined by MD.    Patient to follow up with treating provider after discharge, no acute issues.          Obstructive sleep apnea on CPAP    does not use CPAP at home   O2 as needed   Follow up as outpatient with sleep medicine     1/3/18  Per MD note, follow up with sleep clinic for sleep study.          Benign prostatic hyperplasia    continue tamsulosin     1/3/18  No acute issues.  Continue Flomax as ordered        Hyperlipidemia    Cont home statin to treat     1/3/18  Chronic, no acute issues, review of chart he is not on statin therapy.  Lipid panel from Dec 2017 reviewed and appears normal.        Type 2 diabetes mellitus with stage 3 chronic kidney disease, with long-term current use of insulin    blood glucoses well controlled well controlled   home dose of glargine held as his A1C with WNL   continue with accuchecks and SSI     1/3/18  Lab Results   Component Value Date    HGBA1C 5.6 12/13/2017     POCT Glucose   Date Value Ref Range Status   01/03/2018 155 (H) 70 - 110 mg/dL Final   01/02/2018 283 (H) 70 - 110 mg/dL Final   01/02/2018 190 (H) 70 - 110  mg/dL Final   01/02/2018 176 (H) 70 - 110 mg/dL Final   01/02/2018 139 (H) 70 - 110 mg/dL Final   01/01/2018 213 (H) 70 - 110 mg/dL Final   01/01/2018 177 (H) 70 - 110 mg/dL Final   01/01/2018 203 (H) 70 - 110 mg/dL Final   01/01/2018 153 (H) 70 - 110 mg/dL Final   12/31/2017 191 (H) 70 - 110 mg/dL Final   12/31/2017 166 (H) 70 - 110 mg/dL Final   12/31/2017 251 (H) 70 - 110 mg/dL Final     Hgba1c is normal, continue diet restriction and Novolog per SSI as outlined by MD and follow.            Future Appointments  Date Time Provider Department Center   1/23/2018 2:30 PM Awilda Ho MD [facility] [address]   2/16/2018 4:40 PM Brandon Johansen MD [facility] [address]   3/22/2018 9:30 AM Thompson Stiles MD [facility] MAGDAL[facility] Luevano NP  Department of Hospital Medicine  [facility]

## 2018-01-03 NOTE — ASSESSMENT & PLAN NOTE
BP elevated  BP goal of < 140/90  continue carvedilol, furosemide, and losartan but titrate as needed   hydralazine as needed     1/3/18  BP this morning is 159/71 and HR is 52.  Per MD note, goal BP < 140/90  Currently on Coreg, Lasix and Losartan scheduled and Hydralazine PRN.    Unable to increase coreg due to low HR and Losartan is at max dosing.  Will scheduled Hydralazine and follow.

## 2018-01-03 NOTE — ASSESSMENT & PLAN NOTE
does not use CPAP at home   O2 as needed   Follow up as outpatient with sleep medicine     1/3/18  Per MD note, follow up with sleep clinic for sleep study.

## 2018-01-03 NOTE — ASSESSMENT & PLAN NOTE
creatinine at baseline  avoid nephrotoxic agents     1/3/18  Last S. Cr is 1.6.  Continue to monitor biweekly labs.

## 2018-01-03 NOTE — PT/OT/SLP PROGRESS
Occupational Therapy  Treatment    Joseph Valerio   MRN: [id number]   Admitting Diagnosis: Pneumonia of right lower lobe due to infectious organism    OT Date of Treatment: 01/02/18  Total Time (min): 48 min    Billable Minutes:  Self Care/Home Management 15 and Therapeutic Exercise 33    General Precautions: Standard, fall  Orthopedic Precautions:    Braces:      Do you have any cultural, spiritual, Synagogue conflicts, given your current situation?: no    Subjective:  Communicated with nursing prior to session.  Pt reports he wants to go home as he is tired of being in the hospital. OT noted during IDT that this patient's son is having increasing difficulty providing the required A for this patient to live at home. Therefore, focus of treatment is to maximize safe and feasible performance of household level ADL's and progress to community ADL's per son request.    Pain/Comfort  Pain Rating 1: 0/10  Pain Rating Post-Intervention 1: 0/10    Objective:       Functional Status:  MDS G  Bed Mobility Functional Status: S-SBA  Transfer Functional Status: S-SBA  Walk in Room Functional Status: S-SBA  Walk in Corridor Functional Status: S-SBA  Locomotion on Unit Functional Status: S-SBA  Locomotion Off Unit Functional Status: CGA-Min (A)  Eating Functional Status: mod(I) - (I)  Toilet Use Functional Status: CGA-Min (A)  Personal Hygiene Functional Status: CGA-Min (A)  Moving from seated to standing position: Not steady, but able to stabilize without staff assistance  Turning around and facing the opposite direction while walking: Not steady, only able to stabilize with staff assistance  Moving on and off the toilet: Not steady, but able to stabilize without staff assistance  Surface-to-surface transfer (transfer between bed and chair or wheelchair): Not steady, but able to stabilize without staff assistance    OT Exercises: UE Ergometer x 8 min in seating.    Additional Treatment:  Pt was seen for core stabilization  activities focused on increasing demands to improve pts ability to perform ADL's from standing. Also focused on prolonged standing with facilitation of co-activation proximally during reaching in simulated meal prep activities with education of strategies to stabilize with 1 UE for safety.     Training in tub transfers walker level stepping in/out of tub with use of 2 grab bars. OT recommends tub chair upon d/c.    Patient left up in chair with call button in reach    ASSESSMENT:  Joseph Valerio is a 82 y.o. male with a medical diagnosis of Pneumonia of right lower lobe due to infectious organism who presents motivated to return home with questionable availability of family to provide required A for self care and homemaking tasks. OT discussed with SW requesting A to define the amount of assist pts son will be able to provide on d/c and alternative resources. Pt is focused during treatment and expresses desire to return to his home. Pt has the potential to return to home with S for bathing, intermittent A for homemaking skills, A for all community ADL's from ambulatory level with appropriate DME.      Rehab identified problem list/impairments: weakness, impaired endurance, impaired self care skills, impaired functional mobilty, gait instability, impaired balance, decreased safety awareness, edema    Rehab potential is good    Activity tolerance: Good    Discharge recommendations: home with home health, home health OT     Barriers to discharge: Decreased caregiver support     Equipment recommendations: bedside commode, shower chair     GOALS:    Occupational Therapy Goals        Problem: Occupational Therapy Goal    Goal Priority Disciplines Outcome Interventions   Occupational Therapy Goal     OT, PT/OT Ongoing (interventions implemented as appropriate)    Description:  Goals to be met by: 14 days    Patient will increase functional independence with ADLs by performing:    UE Dressing with [place].  ZACKERY  Dressing with Modified [place].  Grooming while standing with Modified [place].  Toileting from toilet with Modified [place] for hygiene and clothing management.   Bathing from  shower chair/bench with Modified [place].  Toilet transfer to toilet with Modified [place].  Standing tolerance 10 minutes to complete standing ADL and IADL tasks.                      Plan:  Patient to be seen   to address the above listed problems via    Plan of Care expires:    Plan of Care reviewed with: patient    Shanta Antony, OT  01/02/2018

## 2018-01-03 NOTE — PLAN OF CARE
Problem: Occupational Therapy Goal  Goal: Occupational Therapy Goal  Goals to be met by: 14 days    Patient will increase functional independence with ADLs by performing:    UE Dressing with [place].  LE Dressing with Modified [place].  Grooming while standing with Modified [place].  Toileting from toilet with Modified [place] for hygiene and clothing management.   Bathing from  shower chair/bench with Modified [place].  Toilet transfer to toilet with Modified [place].  Standing tolerance 10 minutes to complete standing ADL and IADL tasks.     Outcome: Ongoing (interventions implemented as appropriate)  Shanta Hardy OT  1/2/2018

## 2018-01-03 NOTE — ASSESSMENT & PLAN NOTE
blood glucoses well controlled well controlled   home dose of glargine held as his A1C with WNL   continue with accuchecks and SSI     1/3/18  Lab Results   Component Value Date    HGBA1C 5.6 12/13/2017     POCT Glucose   Date Value Ref Range Status   01/03/2018 155 (H) 70 - 110 mg/dL Final   01/02/2018 283 (H) 70 - 110 mg/dL Final   01/02/2018 190 (H) 70 - 110 mg/dL Final   01/02/2018 176 (H) 70 - 110 mg/dL Final   01/02/2018 139 (H) 70 - 110 mg/dL Final   01/01/2018 213 (H) 70 - 110 mg/dL Final   01/01/2018 177 (H) 70 - 110 mg/dL Final   01/01/2018 203 (H) 70 - 110 mg/dL Final   01/01/2018 153 (H) 70 - 110 mg/dL Final   12/31/2017 191 (H) 70 - 110 mg/dL Final   12/31/2017 166 (H) 70 - 110 mg/dL Final   12/31/2017 251 (H) 70 - 110 mg/dL Final     Hgba1c is normal, continue diet restriction and Novolog per SSI as outlined by MD and follow.

## 2018-01-03 NOTE — ASSESSMENT & PLAN NOTE
complete course of augmentin as prescribed  monitor sats  leukocytosis resolved; afebrile    1/3/18  No febrile, last WBC is normal and has completed course of antibiotics per MD note.   Condition has resolved.

## 2018-01-03 NOTE — ASSESSMENT & PLAN NOTE
Cont home statin to treat     1/3/18  Chronic, no acute issues, review of chart he is not on statin therapy.  Lipid panel from Dec 2017 reviewed and appears normal.

## 2018-01-04 LAB
ANION GAP SERPL CALC-SCNC: 6 MMOL/L
BASOPHILS # BLD AUTO: 0.03 K/UL
BASOPHILS NFR BLD: 0.4 %
BUN SERPL-MCNC: 34 MG/DL
CALCIUM SERPL-MCNC: 8.3 MG/DL
CHLORIDE SERPL-SCNC: 108 MMOL/L
CO2 SERPL-SCNC: 30 MMOL/L
CREAT SERPL-MCNC: 1.5 MG/DL
DIFFERENTIAL METHOD: ABNORMAL
EOSINOPHIL # BLD AUTO: 0.1 K/UL
EOSINOPHIL NFR BLD: 0.9 %
ERYTHROCYTE [DISTWIDTH] IN BLOOD BY AUTOMATED COUNT: 13.7 %
EST. GFR  (AFRICAN AMERICAN): 49.8 ML/MIN/1.73 M^2
EST. GFR  (NON AFRICAN AMERICAN): 43 ML/MIN/1.73 M^2
GLUCOSE SERPL-MCNC: 126 MG/DL
HCT VFR BLD AUTO: 30.5 %
HGB BLD-MCNC: 9 G/DL
LYMPHOCYTES # BLD AUTO: 1.5 K/UL
LYMPHOCYTES NFR BLD: 22.5 %
MAGNESIUM SERPL-MCNC: 1.8 MG/DL
MCH RBC QN AUTO: 27.3 PG
MCHC RBC AUTO-ENTMCNC: 29.5 G/DL
MCV RBC AUTO: 92 FL
MONOCYTES # BLD AUTO: 0.4 K/UL
MONOCYTES NFR BLD: 6.4 %
NEUTROPHILS # BLD AUTO: 4.7 K/UL
NEUTROPHILS NFR BLD: 69.8 %
PHOSPHATE SERPL-MCNC: 2.4 MG/DL
PLATELET # BLD AUTO: 167 K/UL
PMV BLD AUTO: 11.9 FL
POCT GLUCOSE: 147 MG/DL (ref 70–110)
POCT GLUCOSE: 158 MG/DL (ref 70–110)
POCT GLUCOSE: 167 MG/DL (ref 70–110)
POCT GLUCOSE: 168 MG/DL (ref 70–110)
POTASSIUM SERPL-SCNC: 4.8 MMOL/L
RBC # BLD AUTO: 3.3 M/UL
SODIUM SERPL-SCNC: 144 MMOL/L
WBC # BLD AUTO: 6.67 K/UL

## 2018-01-04 PROCEDURE — 36415 COLL VENOUS BLD VENIPUNCTURE: CPT

## 2018-01-04 PROCEDURE — 80048 BASIC METABOLIC PNL TOTAL CA: CPT

## 2018-01-04 PROCEDURE — 97110 THERAPEUTIC EXERCISES: CPT

## 2018-01-04 PROCEDURE — 12000000 HC SNF SEMI-PRIVATE ROOM

## 2018-01-04 PROCEDURE — 25000003 PHARM REV CODE 250: Performed by: HOSPITALIST

## 2018-01-04 PROCEDURE — 97530 THERAPEUTIC ACTIVITIES: CPT

## 2018-01-04 PROCEDURE — 84100 ASSAY OF PHOSPHORUS: CPT

## 2018-01-04 PROCEDURE — 25000003 PHARM REV CODE 250: Performed by: NURSE PRACTITIONER

## 2018-01-04 PROCEDURE — 83735 ASSAY OF MAGNESIUM: CPT

## 2018-01-04 PROCEDURE — 97116 GAIT TRAINING THERAPY: CPT

## 2018-01-04 PROCEDURE — 85025 COMPLETE CBC W/AUTO DIFF WBC: CPT

## 2018-01-04 PROCEDURE — 25000003 PHARM REV CODE 250: Performed by: INTERNAL MEDICINE

## 2018-01-04 RX ORDER — TIMOLOL MALEATE 5 MG/ML
1 SOLUTION/ DROPS OPHTHALMIC NIGHTLY
Status: DISCONTINUED | OUTPATIENT
Start: 2018-01-05 | End: 2018-01-11 | Stop reason: HOSPADM

## 2018-01-04 RX ORDER — SODIUM,POTASSIUM PHOSPHATES 280-250MG
1 POWDER IN PACKET (EA) ORAL
Status: COMPLETED | OUTPATIENT
Start: 2018-01-04 | End: 2018-01-05

## 2018-01-04 RX ORDER — LOSARTAN POTASSIUM 50 MG/1
100 TABLET ORAL DAILY
Status: DISCONTINUED | OUTPATIENT
Start: 2018-01-05 | End: 2018-01-11 | Stop reason: HOSPADM

## 2018-01-04 RX ORDER — FUROSEMIDE 40 MG/1
40 TABLET ORAL DAILY
Status: DISCONTINUED | OUTPATIENT
Start: 2018-01-05 | End: 2018-01-08

## 2018-01-04 RX ADMIN — GABAPENTIN 300 MG: 300 CAPSULE ORAL at 10:01

## 2018-01-04 RX ADMIN — LOSARTAN POTASSIUM 100 MG: 50 TABLET, FILM COATED ORAL at 10:01

## 2018-01-04 RX ADMIN — POTASSIUM & SODIUM PHOSPHATES POWDER PACK 280-160-250 MG 1 PACKET: 280-160-250 PACK at 11:01

## 2018-01-04 RX ADMIN — CARVEDILOL 12.5 MG: 12.5 TABLET, FILM COATED ORAL at 10:01

## 2018-01-04 RX ADMIN — HYDRALAZINE HYDROCHLORIDE 10 MG: 10 TABLET, FILM COATED ORAL at 02:01

## 2018-01-04 RX ADMIN — APIXABAN 2.5 MG: 2.5 TABLET, FILM COATED ORAL at 10:01

## 2018-01-04 RX ADMIN — TAMSULOSIN HYDROCHLORIDE 0.4 MG: 0.4 CAPSULE ORAL at 10:01

## 2018-01-04 RX ADMIN — ASPIRIN 81 MG: 81 TABLET, COATED ORAL at 10:01

## 2018-01-04 RX ADMIN — HYDRALAZINE HYDROCHLORIDE 10 MG: 10 TABLET, FILM COATED ORAL at 10:01

## 2018-01-04 RX ADMIN — TRAVOPROST 1 DROP: 0.04 SOLUTION/ DROPS OPHTHALMIC at 10:01

## 2018-01-04 RX ADMIN — HYDRALAZINE HYDROCHLORIDE 10 MG: 10 TABLET, FILM COATED ORAL at 05:01

## 2018-01-04 RX ADMIN — POTASSIUM & SODIUM PHOSPHATES POWDER PACK 280-160-250 MG 1 PACKET: 280-160-250 PACK at 10:01

## 2018-01-04 RX ADMIN — AMLODIPINE BESYLATE 2.5 MG: 2.5 TABLET ORAL at 10:01

## 2018-01-04 RX ADMIN — POTASSIUM & SODIUM PHOSPHATES POWDER PACK 280-160-250 MG 1 PACKET: 280-160-250 PACK at 05:01

## 2018-01-04 RX ADMIN — FUROSEMIDE 40 MG: 40 TABLET ORAL at 10:01

## 2018-01-04 NOTE — PLAN OF CARE
Problem: Occupational Therapy Goal  Goal: Occupational Therapy Goal  Goals to be met by: 14 days    Patient will increase functional independence with ADLs by performing:    UE Dressing with [place].  LE Dressing with Modified [place].  Grooming while standing with Modified [place].  Toileting from toilet with Modified [place] for hygiene and clothing management.   Bathing from  shower chair/bench with Modified [place].  Toilet transfer to toilet with Modified [place].  Standing tolerance 10 minutes to complete standing ADL and IADL tasks.     Outcome: Ongoing (interventions implemented as appropriate)  Patient's goals are appropriate.   MILLER Jarvis  1/4/2018

## 2018-01-04 NOTE — PLAN OF CARE
Problem: Fall Risk (Adult)  Goal: Absence of Falls  Patient will demonstrate the desired outcomes by discharge/transition of care.   Outcome: Ongoing (interventions implemented as appropriate)  Pt.had no falls this shift.will continue to monitor.

## 2018-01-04 NOTE — PLAN OF CARE
Problem: Diabetes, Type 2 (Adult)  Goal: Signs and Symptoms of Listed Potential Problems Will be Absent, Minimized or Managed (Diabetes, Type 2)  Signs and symptoms of listed potential problems will be absent, minimized or managed by discharge/transition of care (reference Diabetes, Type 2 (Adult) CPG).    01/04/18 0150   Diabetes, Type 2   Problems Assessed (Type 2 Diabetes) all   Problems Present (Type 2 Diabetes) none

## 2018-01-04 NOTE — PT/OT/SLP PROGRESS
Occupational Therapy  Treatment    Joseph Valerio   MRN: [id number]   Admitting Diagnosis: Pneumonia of right lower lobe due to infectious organism    OT Date of Treatment: 01/04/18  Total Time (min): 60 min    Billable Minutes:  Therapeutic Activity 20 and Therapeutic Exercise 40    General Precautions: Standard, fall  Orthopedic Precautions: N/A  Braces: N/A    Do you have any cultural, spiritual, Scientology conflicts, given your current situation?: no    Subjective:  Communicated with patient prior to session.    Pain/Comfort  Pain Rating 1:  (patient did not rate)  Location - Side 1: Bilateral  Location - Orientation 1: generalized  Location 1: back  Pain Addressed 1: Distraction, Reposition  Pain Rating Post-Intervention 1:  (Patient did not rate. )    Objective:       Functional Status:  MDS G  Bed Mobility Functional Status: mod(A) sit>supine  Transfer Functional Status: S w/c>bed stand pivot; w/c<>toilet using RW /c functional ambulation  Dressing Functional Status: 1: S [place] shoes  Toilet Use Functional Status: Max(A) Per patient report from this [facility] 6 Click:  [facility] Total Score: 20    OT Exercises: UE Ergometer 15 min for improving endurance to increase independence with ADLs.     Additional Treatment:  Patient performed a visual perception activity using spatial relations board incorporating standing /c supervision for activity tolerance in order to stand to complete personal hygiene tasks    Patient left HOB elevated with call button in reach and all needs met.     ASSESSMENT:  Joseph Valerio is a 82 y.o. male with a medical diagnosis of Pneumonia of right lower lobe due to infectious organism and presents with the deficits listed below. Patient tolerated treatment session and was motivated to complete tasks. Patient was limited on standing during activity due to back pain and needed to take seated rest breaks. Patient continues to benefit from skilled OT services to achieve maximal  independence.    Rehab identified problem list/impairments: weakness, impaired endurance, impaired self care skills, impaired functional mobilty, gait instability, impaired balance, decreased safety awareness, edema    Rehab potential is good    Activity tolerance: Good    Discharge recommendations: home with home health     Barriers to discharge: Decreased caregiver support     Equipment recommendations: bedside commode, shower chair     GOALS:    Occupational Therapy Goals        Problem: Occupational Therapy Goal    Goal Priority Disciplines Outcome Interventions   Occupational Therapy Goal     OT, PT/OT Ongoing (interventions implemented as appropriate)    Description:  Goals to be met by: 14 days    Patient will increase functional independence with ADLs by performing:    UE Dressing with [place].  LE Dressing with Modified [place].  Grooming while standing with Modified [place].  Toileting from toilet with Modified [place] for hygiene and clothing management.   Bathing from  shower chair/bench with Modified [place].  Toilet transfer to toilet with Modified [place].  Standing tolerance 10 minutes to complete standing ADL and IADL tasks.                      Plan:  Patient to be seen 5 x/week to address the above listed problems via self-care/home management, therapeutic activities, therapeutic exercises  Plan of Care reviewed with: patient    MILLER Jarvis  01/04/2018

## 2018-01-04 NOTE — PLAN OF CARE
Problem: Physical Therapy Goal  Goal: Physical Therapy Goal  Goals to be met by: 14 days     Patient will increase functional independence with mobility by performin. Supine to sit with Modified [place]  2. Sit to supine with Modified [place]  3. Sit to stand transfer with Supervision  4. Bed to chair transfer with Supervision using Rolling Walker  5. Gait  x 150 feet with Supervision using Rolling Walker.   6. Ascend/Descend 4 inch curb step with Stand-by Assistance using Rolling Walker.  7. Stand for 3 minutes with Stand-by Assistance using Rolling Walker while performing dynamic standing activity.  8. Lower extremity exercise program x20 reps per handout, with assistance as needed  9. Pt will improve TUG time to 20s or less in order to decrease fall risk     Outcome: Ongoing (interventions implemented as appropriate)  Goals remain appropriate

## 2018-01-04 NOTE — PT/OT/SLP PROGRESS
"Physical Therapy  Treatment    Joseph Valerio   MRN: [id number]   Admitting Diagnosis: Pneumonia of right lower lobe due to infectious organism    PT Received On: 01/04/18  Total Time (min): 68       Billable Minutes:68    Gait Training 15, Therapeutic Activity 23 and Therapeutic Exercise 30    Treatment Type: Treatment  PT/PTA: PTA     PTA Visit Number: 1       General Precautions: Standard, fall  Orthopedic Precautions: N/A   Braces: N/A    Do you have any cultural, spiritual, Hindu conflicts, given your current situation?: none reported    Subjective:  "tired" agreeable to therapy      Pain/Comfort  Pain Rating 1:  (did not rate "sore")  Location - Side 1: Bilateral  Location - Orientation 1: generalized  Location 1: thigh  Pain Addressed 1: Reposition, Distraction (decline meds)  Pain Rating Post-Intervention 1:  (no further mention)    Objective:  Patient found in      Functional Status:  MDS G  Transfer Functional Status: S-SBA  Walk in Corridor Functional Status: CGA-Min (A)        AM-PAC 6 CLICK MOBILITY  Total Score:18    Transfers:  Sit<>Stand: with RW from  SBA vcs for tech, SBA x 1 and min A from toilet x1,   Stand Pivot Transfer: CG/SBA no AD, grab bar WC<>toilet      Gait:  Amb with RW CG/close SBA vcs for erect posture and closeness to RW ~ 145 ft with 2 brief std'g rest breaks and 105 ft with 1 std'g rest break to include negotiating turns    Advanced Gait:  Curb Step: attempted, deferred 2* to pt needing to go to the BR quickly, plan to practice tomorrow    Therex:  2x15 reps AP,GS,LAQ,hip flex,abd/add    Balance:  SBA with UE support    Additional Treatment:  LBE x 15 min  toileting min A/CGA with trfs off toilet and grab bar, max/total A with cleaning/changing clothes/sock/shoes 2* to BM accident    Patient left up in chair with call button in reach and belongings in reach.    Assessment:  Joseph Valerio is a 82 y.o. male with a medical diagnosis of Pneumonia of right lower lobe due to " infectious organism.  Pt tolerated fairly well, able to inc gait distance/endurance however became a little emotional/upset 2* to BM, able to comfort,  pt would continue to benefit from skilled PT services to improve overall functional mobility, strength and endurance.  .    Rehab identified problem list/impairments: weakness, impaired endurance, impaired self care skills, impaired functional mobilty, gait instability, impaired balance    Rehab potential is good.    Activity tolerance: Fair    Discharge recommendations: home with home health     Barriers to discharge: Decreased caregiver support    Equipment recommendations: bedside commode, bath bench     GOALS:    Physical Therapy Goals        Problem: Physical Therapy Goal    Goal Priority Disciplines Outcome Goal Variances Interventions   Physical Therapy Goal     PT/OT, PT Ongoing (interventions implemented as appropriate)     Description:  Goals to be met by: 14 days     Patient will increase functional independence with mobility by performin. Supine to sit with Modified [place]  2. Sit to supine with Modified [place]  3. Sit to stand transfer with Supervision  4. Bed to chair transfer with Supervision using Rolling Walker  5. Gait  x 150 feet with Supervision using Rolling Walker.   6. Ascend/Descend 4 inch curb step with Stand-by Assistance using Rolling Walker.  7. Stand for 3 minutes with Stand-by Assistance using Rolling Walker while performing dynamic standing activity.  8. Lower extremity exercise program x20 reps per handout, with assistance as needed  9. Pt will improve TUG time to 20s or less in order to decrease fall risk                      PLAN:    Patient to be seen  (5-6X/week)  to address the above listed problems via gait training, therapeutic activities, therapeutic exercises  Plan of Care expires: 18  Plan of Care reviewed with: patient    Laura Segovia, PTA  2018

## 2018-01-05 LAB
POCT GLUCOSE: 147 MG/DL (ref 70–110)
POCT GLUCOSE: 152 MG/DL (ref 70–110)
POCT GLUCOSE: 164 MG/DL (ref 70–110)
POCT GLUCOSE: 197 MG/DL (ref 70–110)
POCT GLUCOSE: 207 MG/DL (ref 70–110)

## 2018-01-05 PROCEDURE — 97116 GAIT TRAINING THERAPY: CPT

## 2018-01-05 PROCEDURE — 12000000 HC SNF SEMI-PRIVATE ROOM

## 2018-01-05 PROCEDURE — 97535 SELF CARE MNGMENT TRAINING: CPT

## 2018-01-05 PROCEDURE — 25000003 PHARM REV CODE 250: Performed by: NURSE PRACTITIONER

## 2018-01-05 PROCEDURE — 97110 THERAPEUTIC EXERCISES: CPT

## 2018-01-05 PROCEDURE — 25000003 PHARM REV CODE 250: Performed by: HOSPITALIST

## 2018-01-05 PROCEDURE — 97530 THERAPEUTIC ACTIVITIES: CPT

## 2018-01-05 PROCEDURE — 25000003 PHARM REV CODE 250: Performed by: INTERNAL MEDICINE

## 2018-01-05 PROCEDURE — 99900058 HC 022 PAID UNDER SNF PPS

## 2018-01-05 RX ADMIN — STANDARDIZED SENNA CONCENTRATE AND DOCUSATE SODIUM 1 TABLET: 8.6; 5 TABLET, FILM COATED ORAL at 08:01

## 2018-01-05 RX ADMIN — APIXABAN 2.5 MG: 2.5 TABLET, FILM COATED ORAL at 08:01

## 2018-01-05 RX ADMIN — OXYCODONE HYDROCHLORIDE 5 MG: 5 TABLET ORAL at 09:01

## 2018-01-05 RX ADMIN — LOSARTAN POTASSIUM 100 MG: 50 TABLET, FILM COATED ORAL at 09:01

## 2018-01-05 RX ADMIN — CARVEDILOL 12.5 MG: 12.5 TABLET, FILM COATED ORAL at 08:01

## 2018-01-05 RX ADMIN — FUROSEMIDE 40 MG: 40 TABLET ORAL at 09:01

## 2018-01-05 RX ADMIN — HYDRALAZINE HYDROCHLORIDE 10 MG: 10 TABLET, FILM COATED ORAL at 09:01

## 2018-01-05 RX ADMIN — TAMSULOSIN HYDROCHLORIDE 0.4 MG: 0.4 CAPSULE ORAL at 09:01

## 2018-01-05 RX ADMIN — TRAVOPROST 1 DROP: 0.04 SOLUTION/ DROPS OPHTHALMIC at 08:01

## 2018-01-05 RX ADMIN — POTASSIUM & SODIUM PHOSPHATES POWDER PACK 280-160-250 MG 1 PACKET: 280-160-250 PACK at 08:01

## 2018-01-05 RX ADMIN — AMLODIPINE BESYLATE 2.5 MG: 2.5 TABLET ORAL at 09:01

## 2018-01-05 RX ADMIN — GABAPENTIN 300 MG: 300 CAPSULE ORAL at 08:01

## 2018-01-05 RX ADMIN — POTASSIUM & SODIUM PHOSPHATES POWDER PACK 280-160-250 MG 1 PACKET: 280-160-250 PACK at 05:01

## 2018-01-05 RX ADMIN — INSULIN ASPART 2 UNITS: 100 INJECTION, SOLUTION INTRAVENOUS; SUBCUTANEOUS at 11:01

## 2018-01-05 RX ADMIN — POTASSIUM & SODIUM PHOSPHATES POWDER PACK 280-160-250 MG 1 PACKET: 280-160-250 PACK at 12:01

## 2018-01-05 RX ADMIN — ASPIRIN 81 MG: 81 TABLET, COATED ORAL at 09:01

## 2018-01-05 RX ADMIN — APIXABAN 2.5 MG: 2.5 TABLET, FILM COATED ORAL at 09:01

## 2018-01-05 RX ADMIN — HYDRALAZINE HYDROCHLORIDE 10 MG: 10 TABLET, FILM COATED ORAL at 06:01

## 2018-01-05 RX ADMIN — TIMOLOL MALEATE 1 DROP: 5 SOLUTION OPHTHALMIC at 08:01

## 2018-01-05 RX ADMIN — CARVEDILOL 12.5 MG: 12.5 TABLET, FILM COATED ORAL at 09:01

## 2018-01-05 NOTE — PT/OT/SLP PROGRESS
Occupational Therapy  Treatment    Joseph Valerio   MRN: [id number]   Admitting Diagnosis: Pneumonia of right lower lobe due to infectious organism    OT Date of Treatment: 01/05/18  Total Time (min): 53 min    Billable Minutes:  Self Care/Home Management 10, Therapeutic Activity 28 and Therapeutic Exercise 15    General Precautions: Standard, fall  Orthopedic Precautions: N/A  Braces: N/A    Do you have any cultural, spiritual, Hindu conflicts, given your current situation?: no    Subjective:  Communicated with patient prior to session.    Pain/Comfort  Pain Rating 1:  (Patient did not rate)  Location - Side 1: Bilateral  Location - Orientation 1: generalized  Location 1: back  Pain Addressed 1: Reposition, Distraction, Cessation of Activity  Pain Rating Post-Intervention 1:  (Patient did not rate)    Objective:       Functional Status:  MDS G  Bed Mobility Functional Status: CGA supine>sit  Transfer Functional Status: SBA bed>w/c stand pivot; w/c>toilet using RW /c functional ambulation; toilet>bed using RW /c functional ambulation  Dressing Functional Status: 1: S [place] and doffing shoes  Toilet Use Functional Status: S       AMPAC 6 Click:  AMPAC Total Score: 21    OT Exercises: UE Ergometer 15 min for improving endurance to increase independence with ADLs.     Additional Treatment:  Patient performed a visual motor/visual perception activity using balloon and ball for eye hand coordination, UE ROM, UE endurance, and timing for improving independence with household task.    Patient performed a visual perception activity matching dominoes incorporating standing for activity tolerance to increase independence with standing to complete personal hygiene tasks.     Patient left EOB with call button in reach, nurse and friend present and all needs met.     ASSESSMENT:  Joseph Valerio is a 82 y.o. male with a medical diagnosis of Pneumonia of right lower lobe due to infectious organism and presents with the  deficits listed below. Patient tolerated treatment session and was motivated to complete tasks. Patient did need seated rest breaks during standing activity due to back pain. Patient continues to benefit from skilled OT services to achieve maximal independence.    Rehab identified problem list/impairments: weakness, impaired endurance, impaired self care skills, impaired functional mobilty, gait instability, impaired balance, decreased safety awareness, edema    Rehab potential is good    Activity tolerance: Good    Discharge recommendations: home with home health     Barriers to discharge: Decreased caregiver support     Equipment recommendations: bedside commode, shower chair     GOALS:    Occupational Therapy Goals        Problem: Occupational Therapy Goal    Goal Priority Disciplines Outcome Interventions   Occupational Therapy Goal     OT, PT/OT Ongoing (interventions implemented as appropriate)    Description:  Goals to be met by: 14 days    Patient will increase functional independence with ADLs by performing:    UE Dressing with [place].  LE Dressing with Modified [place].  Grooming while standing with Modified [place].  Toileting from toilet with Modified [place] for hygiene and clothing management.   Bathing from  shower chair/bench with Modified [place].  Toilet transfer to toilet with Modified [place].  Standing tolerance 10 minutes to complete standing ADL and IADL tasks.                      Plan:  Patient to be seen 5 x/week to address the above listed problems via self-care/home management, therapeutic activities, therapeutic exercises  Plan of Care reviewed with: patient    MILLER Jarvis  01/05/2018

## 2018-01-05 NOTE — PT/OT/SLP PROGRESS
"Physical Therapy  Treatment    Joseph Valerio   MRN: [id number]   Admitting Diagnosis: Pneumonia of right lower lobe due to infectious organism    PT Received On: 01/05/18  Total Time (min): 45       Billable Minutes:45    Gait Training 15, Therapeutic Activity 10 and Therapeutic Exercise 20    Treatment Type: Treatment  PT/PTA: PTA     PTA Visit Number: 2       General Precautions: Standard, fall  Orthopedic Precautions: N/A   Braces: N/A    Do you have any cultural, spiritual, Druze conflicts, given your current situation?: none reported    Subjective:  "shouldn't have to go to the bathroom, I went 3 times already" nsg notified      Pain/Comfort  Pain Rating 1:  (did not rate)  Location - Side 1: Bilateral  Location - Orientation 1: generalized  Location 1: knee  Pain Addressed 1: Reposition, Distraction, Cessation of Activity (decline meds)  Pain Rating Post-Intervention 1:  (inc with mobility)    Objective:  Patient found with:  (sitting EOB)       Functional Status:  MDS G  Bed Mobility Functional Status: S-SBA  Transfer Functional Status: S-SBA  Walk in Corridor Functional Status: S-SBA          AM-PAC 6 CLICK MOBILITY  Total Score:23    Bed Mobility:  Sit>Supine:SBA/S (pt repts sometimes need help getting my legs in)  Supine>Sit: SBA/S    Transfers:  Sit<>Stand: with RW SBA  Stand Pivot Transfer: SBA no AD EOB>WC      Gait:  Amb with RW close SBA vcs for taking his time for safety, erect posture ~ 176 ft and 118 ft seated rest break, no LOB     Advanced Gait:  Curb Step: asc/amy 4" curb with RW close SBA vcs for safety/tech    Therex:  2x15 reps AP,LAQ,hip flex    Additional Treatment:  LBE x 15 min    Patient left up in chair with call button in reach and belongings in reach.    Assessment:  Joseph Valerio is a 82 y.o. male with a medical diagnosis of Pneumonia of right lower lobe due to infectious organism.  Pt tolerated well, pt would continue to benefit from skilled PT services to improve overall " functional mobility, strength and endurance.  .    Rehab identified problem list/impairments: weakness, impaired endurance, impaired self care skills, impaired functional mobilty, gait instability, impaired balance    Rehab potential is good.    Activity tolerance: Fair    Discharge recommendations: home with home health     Barriers to discharge: Decreased caregiver support    Equipment recommendations: bedside commode, bath bench     GOALS:    Physical Therapy Goals        Problem: Physical Therapy Goal    Goal Priority Disciplines Outcome Goal Variances Interventions   Physical Therapy Goal     PT/OT, PT Ongoing (interventions implemented as appropriate)     Description:  Goals to be met by: 14 days     Patient will increase functional independence with mobility by performin. Supine to sit with Modified [place]  2. Sit to supine with Modified [place]  3. Sit to stand transfer with Supervision  4. Bed to chair transfer with Supervision using Rolling Walker  5. Gait  x 150 feet with Supervision using Rolling Walker.   6. Ascend/Descend 4 inch curb step with Stand-by Assistance using Rolling Walker.  7. Stand for 3 minutes with Stand-by Assistance using Rolling Walker while performing dynamic standing activity.  8. Lower extremity exercise program x20 reps per handout, with assistance as needed  9. Pt will improve TUG time to 20s or less in order to decrease fall risk                      PLAN:    Patient to be seen  (5-6X/week)  to address the above listed problems via gait training, therapeutic activities, therapeutic exercises  Plan of Care expires: 18  Plan of Care reviewed with: patient    Laura Luca, PTA  2018

## 2018-01-05 NOTE — PLAN OF CARE
Problem: Physical Therapy Goal  Goal: Physical Therapy Goal  Goals to be met by: 14 days     Patient will increase functional independence with mobility by performin. Supine to sit with Modified [place]  2. Sit to supine with Modified [place]  3. Sit to stand transfer with Supervision  4. Bed to chair transfer with Supervision using Rolling Walker  5. Gait  x 150 feet with Supervision using Rolling Walker.   6. Ascend/Descend 4 inch curb step with Stand-by Assistance using Rolling Walker.  7. Stand for 3 minutes with Stand-by Assistance using Rolling Walker while performing dynamic standing activity.  8. Lower extremity exercise program x20 reps per handout, with assistance as needed  9. Pt will improve TUG time to 20s or less in order to decrease fall risk     Outcome: Ongoing (interventions implemented as appropriate)  Goals remain appropriate

## 2018-01-05 NOTE — PLAN OF CARE
Problem: Occupational Therapy Goal  Goal: Occupational Therapy Goal  Goals to be met by: 14 days    Patient will increase functional independence with ADLs by performing:    UE Dressing with [place].  LE Dressing with Modified [place].  Grooming while standing with Modified [place].  Toileting from toilet with Modified [place] for hygiene and clothing management.   Bathing from  shower chair/bench with Modified [place].  Toilet transfer to toilet with Modified [place].  Standing tolerance 10 minutes to complete standing ADL and IADL tasks.     Outcome: Ongoing (interventions implemented as appropriate)  Patient's goals are appropriate.   MILLER Jarvis  1/5/2018

## 2018-01-05 NOTE — PLAN OF CARE
Problem: Patient Care Overview  Goal: Plan of Care Review  Outcome: Ongoing (interventions implemented as appropriate)  BLOOD GLUCOSES MONITORED.FALL PRECAUTIONS MAINTAINED NO INJURIES NOTED.BARRIER CREAM APPLIED TO PRESSURE ULCER.

## 2018-01-06 PROBLEM — L89.96 PRESSURE INJURY OF DEEP TISSUE: Status: ACTIVE | Noted: 2018-01-06

## 2018-01-06 LAB
POCT GLUCOSE: 149 MG/DL (ref 70–110)
POCT GLUCOSE: 171 MG/DL (ref 70–110)
POCT GLUCOSE: 177 MG/DL (ref 70–110)
POCT GLUCOSE: 191 MG/DL (ref 70–110)

## 2018-01-06 PROCEDURE — 25000003 PHARM REV CODE 250: Performed by: HOSPITALIST

## 2018-01-06 PROCEDURE — 12000000 HC SNF SEMI-PRIVATE ROOM

## 2018-01-06 PROCEDURE — 25000003 PHARM REV CODE 250: Performed by: NURSE PRACTITIONER

## 2018-01-06 PROCEDURE — 25000003 PHARM REV CODE 250: Performed by: INTERNAL MEDICINE

## 2018-01-06 PROCEDURE — 97110 THERAPEUTIC EXERCISES: CPT

## 2018-01-06 RX ADMIN — STANDARDIZED SENNA CONCENTRATE AND DOCUSATE SODIUM 1 TABLET: 8.6; 5 TABLET, FILM COATED ORAL at 08:01

## 2018-01-06 RX ADMIN — CARVEDILOL 12.5 MG: 12.5 TABLET, FILM COATED ORAL at 08:01

## 2018-01-06 RX ADMIN — TIMOLOL MALEATE 1 DROP: 5 SOLUTION OPHTHALMIC at 08:01

## 2018-01-06 RX ADMIN — TAMSULOSIN HYDROCHLORIDE 0.4 MG: 0.4 CAPSULE ORAL at 08:01

## 2018-01-06 RX ADMIN — TRAVOPROST 1 DROP: 0.04 SOLUTION/ DROPS OPHTHALMIC at 08:01

## 2018-01-06 RX ADMIN — HYDRALAZINE HYDROCHLORIDE 10 MG: 10 TABLET, FILM COATED ORAL at 05:01

## 2018-01-06 RX ADMIN — ASPIRIN 81 MG: 81 TABLET, COATED ORAL at 08:01

## 2018-01-06 RX ADMIN — LOSARTAN POTASSIUM 100 MG: 50 TABLET, FILM COATED ORAL at 08:01

## 2018-01-06 RX ADMIN — APIXABAN 2.5 MG: 2.5 TABLET, FILM COATED ORAL at 09:01

## 2018-01-06 RX ADMIN — APIXABAN 2.5 MG: 2.5 TABLET, FILM COATED ORAL at 08:01

## 2018-01-06 RX ADMIN — FUROSEMIDE 40 MG: 40 TABLET ORAL at 08:01

## 2018-01-06 RX ADMIN — GABAPENTIN 300 MG: 300 CAPSULE ORAL at 08:01

## 2018-01-06 RX ADMIN — HYDRALAZINE HYDROCHLORIDE 10 MG: 10 TABLET, FILM COATED ORAL at 11:01

## 2018-01-06 RX ADMIN — HYDRALAZINE HYDROCHLORIDE 10 MG: 10 TABLET, FILM COATED ORAL at 02:01

## 2018-01-06 RX ADMIN — AMLODIPINE BESYLATE 2.5 MG: 2.5 TABLET ORAL at 08:01

## 2018-01-06 NOTE — PLAN OF CARE
Problem: Diabetes, Type 2 (Adult)  Goal: Signs and Symptoms of Listed Potential Problems Will be Absent, Minimized or Managed (Diabetes, Type 2)  Signs and symptoms of listed potential problems will be absent, minimized or managed by discharge/transition of care (reference Diabetes, Type 2 (Adult) CPG).    01/06/18 0216   Diabetes, Type 2   Problems Assessed (Type 2 Diabetes) all   Problems Present (Type 2 Diabetes) hyperglycemia

## 2018-01-06 NOTE — PT/OT/SLP PROGRESS
Occupational Therapy  Treatment    Joseph Valerio   MRN: [id number]   Admitting Diagnosis: Pneumonia of right lower lobe due to infectious organism    OT Date of Treatment: 01/06/18  Total Time (min): 30 min    Billable Minutes:  Therapeutic Exercise 30    General Precautions: Standard, fall  Orthopedic Precautions: N/A  Braces: N/A    Do you have any cultural, spiritual, Bahai conflicts, given your current situation?: no    Subjective:  Communicated with patient prior to session.    Pain/Comfort  Pain Rating 1: 6/10  Location - Side 1: Bilateral  Location - Orientation 1: generalized  Location 1: back  Pain Addressed 1: Reposition, Distraction, Cessation of Activity  Pain Rating Post-Intervention 1: 6/10    Objective:     AMPA 6 Click:  [facility] Total Score: 21    OT Exercises: UE Ergometer 15 min for improving endurance to increase independence with ADLs.    Patient performed B UE ROM exercises using 2# dowel princess 3 x 10 (chest press, shoulder flexion, front rows, and back rows) focusing to improve strength and endurance to increase independence with ADLs.     Patient left up in chair with call button in reach and all needs met.     ASSESSMENT:  Joseph Valerio is a 82 y.o. male with a medical diagnosis of Pneumonia of right lower lobe due to infectious organism and presents with the deficits listed below. Patient tolerated treatment session, but declined ADLs and standing activities. Patient continues to benefit from skilled OT services to achieve maximal independence.    Rehab identified problem list/impairments: weakness, impaired endurance, impaired self care skills, impaired functional mobilty, gait instability, impaired balance, decreased safety awareness, edema    Rehab potential is good    Activity tolerance: Good    Discharge recommendations: home with home health     Barriers to discharge: Decreased caregiver support     Equipment recommendations: bedside commode, shower chair     GOALS:     Occupational Therapy Goals        Problem: Occupational Therapy Goal    Goal Priority Disciplines Outcome Interventions   Occupational Therapy Goal     OT, PT/OT Ongoing (interventions implemented as appropriate)    Description:  Goals to be met by: 14 days    Patient will increase functional independence with ADLs by performing:    UE Dressing with [place].  LE Dressing with Modified [place].  Grooming while standing with Modified [place].  Toileting from toilet with Modified [place] for hygiene and clothing management.   Bathing from  shower chair/bench with Modified [place].  Toilet transfer to toilet with Modified [place].  Standing tolerance 10 minutes to complete standing ADL and IADL tasks.                      Plan:  Patient to be seen 5 x/week to address the above listed problems via self-care/home management, therapeutic activities, therapeutic exercises  Plan of Care reviewed with: patient    MILLER Jarvis  01/06/2018

## 2018-01-06 NOTE — PLAN OF CARE
Problem: Occupational Therapy Goal  Goal: Occupational Therapy Goal  Goals to be met by: 14 days    Patient will increase functional independence with ADLs by performing:    UE Dressing with [place].  LE Dressing with Modified [place].  Grooming while standing with Modified [place].  Toileting from toilet with Modified [place] for hygiene and clothing management.   Bathing from  shower chair/bench with Modified [place].  Toilet transfer to toilet with Modified [place].  Standing tolerance 10 minutes to complete standing ADL and IADL tasks.     Outcome: Ongoing (interventions implemented as appropriate)  Patient's goals are appropriate.  MILLER Jarvis  1/6/2018

## 2018-01-07 LAB
POCT GLUCOSE: 121 MG/DL (ref 70–110)
POCT GLUCOSE: 139 MG/DL (ref 70–110)
POCT GLUCOSE: 199 MG/DL (ref 70–110)
POCT GLUCOSE: 218 MG/DL (ref 70–110)

## 2018-01-07 PROCEDURE — 25000003 PHARM REV CODE 250: Performed by: INTERNAL MEDICINE

## 2018-01-07 PROCEDURE — 25000003 PHARM REV CODE 250: Performed by: HOSPITALIST

## 2018-01-07 PROCEDURE — 97116 GAIT TRAINING THERAPY: CPT

## 2018-01-07 PROCEDURE — 25000003 PHARM REV CODE 250: Performed by: NURSE PRACTITIONER

## 2018-01-07 PROCEDURE — 12000000 HC SNF SEMI-PRIVATE ROOM

## 2018-01-07 PROCEDURE — 97110 THERAPEUTIC EXERCISES: CPT

## 2018-01-07 PROCEDURE — 97530 THERAPEUTIC ACTIVITIES: CPT

## 2018-01-07 RX ADMIN — HYDRALAZINE HYDROCHLORIDE 10 MG: 10 TABLET, FILM COATED ORAL at 06:01

## 2018-01-07 RX ADMIN — GABAPENTIN 300 MG: 300 CAPSULE ORAL at 10:01

## 2018-01-07 RX ADMIN — APIXABAN 2.5 MG: 2.5 TABLET, FILM COATED ORAL at 10:01

## 2018-01-07 RX ADMIN — HYDRALAZINE HYDROCHLORIDE 10 MG: 10 TABLET, FILM COATED ORAL at 10:01

## 2018-01-07 RX ADMIN — FUROSEMIDE 40 MG: 40 TABLET ORAL at 08:01

## 2018-01-07 RX ADMIN — ASPIRIN 81 MG: 81 TABLET, COATED ORAL at 08:01

## 2018-01-07 RX ADMIN — CARVEDILOL 12.5 MG: 12.5 TABLET, FILM COATED ORAL at 10:01

## 2018-01-07 RX ADMIN — HYDRALAZINE HYDROCHLORIDE 10 MG: 10 TABLET, FILM COATED ORAL at 01:01

## 2018-01-07 RX ADMIN — LOSARTAN POTASSIUM 100 MG: 50 TABLET, FILM COATED ORAL at 08:01

## 2018-01-07 RX ADMIN — AMLODIPINE BESYLATE 2.5 MG: 2.5 TABLET ORAL at 08:01

## 2018-01-07 RX ADMIN — CARVEDILOL 12.5 MG: 12.5 TABLET, FILM COATED ORAL at 08:01

## 2018-01-07 RX ADMIN — INSULIN ASPART 2 UNITS: 100 INJECTION, SOLUTION INTRAVENOUS; SUBCUTANEOUS at 08:01

## 2018-01-07 RX ADMIN — TAMSULOSIN HYDROCHLORIDE 0.4 MG: 0.4 CAPSULE ORAL at 08:01

## 2018-01-07 RX ADMIN — TRAVOPROST 1 DROP: 0.04 SOLUTION/ DROPS OPHTHALMIC at 10:01

## 2018-01-07 RX ADMIN — TIMOLOL MALEATE 1 DROP: 5 SOLUTION OPHTHALMIC at 10:01

## 2018-01-07 RX ADMIN — APIXABAN 2.5 MG: 2.5 TABLET, FILM COATED ORAL at 08:01

## 2018-01-07 NOTE — PLAN OF CARE
Problem: Diabetes, Type 2 (Adult)  Goal: Signs and Symptoms of Listed Potential Problems Will be Absent, Minimized or Managed (Diabetes, Type 2)  Signs and symptoms of listed potential problems will be absent, minimized or managed by discharge/transition of care (reference Diabetes, Type 2 (Adult) CPG).   Outcome: Ongoing (interventions implemented as appropriate)   01/07/18 0319   Diabetes, Type 2   Problems Assessed (Type 2 Diabetes) hyperglycemia;hypoglycemia;situational response       Problem: Fall Risk (Adult)  Goal: Identify Related Risk Factors and Signs and Symptoms  Related risk factors and signs and symptoms are identified upon initiation of Human Response Clinical Practice Guideline (CPG)   Outcome: Ongoing (interventions implemented as appropriate)   01/07/18 0319   Fall Risk   Related Risk Factors (Fall Risk) fatigue/slow reaction;gait/mobility problems

## 2018-01-07 NOTE — PLAN OF CARE
Problem: Physical Therapy Goal  Goal: Physical Therapy Goal  Goals to be met by: 14 days     Patient will increase functional independence with mobility by performin. Supine to sit with Modified [place]  2. Sit to supine with Modified [place]  3. Sit to stand transfer with Supervision  4. Bed to chair transfer with Supervision using Rolling Walker  5. Gait  x 150 feet with Supervision using Rolling Walker.   6. Ascend/Descend 4 inch curb step with Stand-by Assistance using Rolling Walker.  7. Stand for 3 minutes with Stand-by Assistance using Rolling Walker while performing dynamic standing activity.  8. Lower extremity exercise program x20 reps per handout, with assistance as needed  9. Pt will improve TUG time to 20s or less in order to decrease fall risk     Outcome: Ongoing (interventions implemented as appropriate)  Goals remain appropriate

## 2018-01-07 NOTE — PT/OT/SLP PROGRESS
"Physical Therapy  Treatment    Joseph Valerio   MRN: [id number]   Admitting Diagnosis: Pneumonia of right lower lobe due to infectious organism    PT Received On: 01/07/18  Total Time (min): 40       Billable Minutes:40    Gait Training 10, Therapeutic Activity 20 and Therapeutic Exercise 10    Treatment Type: Treatment  PT/PTA: PTA     PTA Visit Number: 3       General Precautions: Standard, fall  Orthopedic Precautions: N/A   Braces: N/A    Do you have any cultural, spiritual, Jain conflicts, given your current situation?: none reported    Subjective:  "alright"      Pain/Comfort  Pain Rating 1:  (did not rate)  Location - Side 1: Bilateral  Location - Orientation 1: generalized  Location 1: knee  Pain Addressed 1: Reposition, Distraction, Cessation of Activity (decline meds)  Pain Rating Post-Intervention 1:  (inc with WB/gait/cold weather)    Objective:  Patient found with:  (in wc)       Functional Status:  MDS G  Transfer Functional Status: S-SBA  Walk in Corridor Functional Status: S-SBA          AM-PAC 6 CLICK MOBILITY  Total Score:23    Transfers:  Sit<>Stand: S with RW  Std pivot WC<>toilet with grab bars S    Gait:  Amb with  ft S no LOB, 2 brief std'g rest breaks 2* to B knee pain     Therex:  2x15 AP,LAQ,hip flex    Balance:  SBA/S with RW    Additional Treatment:  toileting SBA/S with trf, clothing mgmt, I with aurelia care    Patient left up in chair with call button in reach and belongings in reach.    Assessment:  Joseph Valerio is a 82 y.o. male with a medical diagnosis of Pneumonia of right lower lobe due to infectious organism.  Pt tolerated well, pt would continue to benefit from skilled PT services to improve overall functional mobility, strength and endurance.  .    Rehab identified problem list/impairments: weakness, impaired endurance, impaired self care skills, impaired functional mobilty, gait instability, impaired balance    Rehab potential is good.    Activity tolerance: " Fair    Discharge recommendations: home with home health     Barriers to discharge: Decreased caregiver support    Equipment recommendations: bedside commode, bath bench     GOALS:    Physical Therapy Goals        Problem: Physical Therapy Goal    Goal Priority Disciplines Outcome Goal Variances Interventions   Physical Therapy Goal     PT/OT, PT Ongoing (interventions implemented as appropriate)     Description:  Goals to be met by: 14 days     Patient will increase functional independence with mobility by performin. Supine to sit with Modified [place]  2. Sit to supine with Modified [place]  3. Sit to stand transfer with Supervision  4. Bed to chair transfer with Supervision using Rolling Walker  5. Gait  x 150 feet with Supervision using Rolling Walker.   6. Ascend/Descend 4 inch curb step with Stand-by Assistance using Rolling Walker.  7. Stand for 3 minutes with Stand-by Assistance using Rolling Walker while performing dynamic standing activity.  8. Lower extremity exercise program x20 reps per handout, with assistance as needed  9. Pt will improve TUG time to 20s or less in order to decrease fall risk                      PLAN:    Patient to be seen  (5-6X/week)  to address the above listed problems via gait training, therapeutic activities, therapeutic exercises  Plan of Care expires: 18  Plan of Care reviewed with: patient    Laura Segovia, PTA  2018

## 2018-01-08 LAB
ANION GAP SERPL CALC-SCNC: 5 MMOL/L
BASOPHILS # BLD AUTO: 0.03 K/UL
BASOPHILS NFR BLD: 0.5 %
BUN SERPL-MCNC: 37 MG/DL
CALCIUM SERPL-MCNC: 8.2 MG/DL
CHLORIDE SERPL-SCNC: 111 MMOL/L
CO2 SERPL-SCNC: 30 MMOL/L
CREAT SERPL-MCNC: 1.6 MG/DL
DIFFERENTIAL METHOD: ABNORMAL
EOSINOPHIL # BLD AUTO: 0.1 K/UL
EOSINOPHIL NFR BLD: 1.1 %
ERYTHROCYTE [DISTWIDTH] IN BLOOD BY AUTOMATED COUNT: 13.9 %
EST. GFR  (AFRICAN AMERICAN): 46 ML/MIN/1.73 M^2
EST. GFR  (NON AFRICAN AMERICAN): 39.8 ML/MIN/1.73 M^2
GLUCOSE SERPL-MCNC: 131 MG/DL
HCT VFR BLD AUTO: 29.6 %
HGB BLD-MCNC: 8.7 G/DL
LYMPHOCYTES # BLD AUTO: 1.3 K/UL
LYMPHOCYTES NFR BLD: 21 %
MAGNESIUM SERPL-MCNC: 1.9 MG/DL
MCH RBC QN AUTO: 27.2 PG
MCHC RBC AUTO-ENTMCNC: 29.4 G/DL
MCV RBC AUTO: 93 FL
MONOCYTES # BLD AUTO: 0.4 K/UL
MONOCYTES NFR BLD: 7.1 %
NEUTROPHILS # BLD AUTO: 4.4 K/UL
NEUTROPHILS NFR BLD: 70.3 %
PHOSPHATE SERPL-MCNC: 3.1 MG/DL
PLATELET # BLD AUTO: 162 K/UL
PMV BLD AUTO: 12.5 FL
POCT GLUCOSE: 140 MG/DL (ref 70–110)
POCT GLUCOSE: 151 MG/DL (ref 70–110)
POCT GLUCOSE: 279 MG/DL (ref 70–110)
POTASSIUM SERPL-SCNC: 4.4 MMOL/L
RBC # BLD AUTO: 3.2 M/UL
SODIUM SERPL-SCNC: 146 MMOL/L
WBC # BLD AUTO: 6.19 K/UL

## 2018-01-08 PROCEDURE — 84100 ASSAY OF PHOSPHORUS: CPT

## 2018-01-08 PROCEDURE — 97110 THERAPEUTIC EXERCISES: CPT

## 2018-01-08 PROCEDURE — 25000003 PHARM REV CODE 250: Performed by: INTERNAL MEDICINE

## 2018-01-08 PROCEDURE — 12000000 HC SNF SEMI-PRIVATE ROOM

## 2018-01-08 PROCEDURE — 63600175 PHARM REV CODE 636 W HCPCS: Performed by: INTERNAL MEDICINE

## 2018-01-08 PROCEDURE — 25000003 PHARM REV CODE 250: Performed by: NURSE PRACTITIONER

## 2018-01-08 PROCEDURE — 83735 ASSAY OF MAGNESIUM: CPT

## 2018-01-08 PROCEDURE — 85025 COMPLETE CBC W/AUTO DIFF WBC: CPT

## 2018-01-08 PROCEDURE — 80048 BASIC METABOLIC PNL TOTAL CA: CPT

## 2018-01-08 PROCEDURE — 97803 MED NUTRITION INDIV SUBSEQ: CPT

## 2018-01-08 PROCEDURE — 25000003 PHARM REV CODE 250: Performed by: HOSPITALIST

## 2018-01-08 PROCEDURE — 97116 GAIT TRAINING THERAPY: CPT

## 2018-01-08 PROCEDURE — 36415 COLL VENOUS BLD VENIPUNCTURE: CPT

## 2018-01-08 PROCEDURE — 97530 THERAPEUTIC ACTIVITIES: CPT

## 2018-01-08 RX ORDER — FUROSEMIDE 40 MG/1
40 TABLET ORAL DAILY
Status: DISCONTINUED | OUTPATIENT
Start: 2018-01-10 | End: 2018-01-11 | Stop reason: HOSPADM

## 2018-01-08 RX ADMIN — HYDRALAZINE HYDROCHLORIDE 10 MG: 10 TABLET, FILM COATED ORAL at 05:01

## 2018-01-08 RX ADMIN — LOSARTAN POTASSIUM 100 MG: 50 TABLET, FILM COATED ORAL at 09:01

## 2018-01-08 RX ADMIN — TRAVOPROST 1 DROP: 0.04 SOLUTION/ DROPS OPHTHALMIC at 10:01

## 2018-01-08 RX ADMIN — FUROSEMIDE 40 MG: 40 TABLET ORAL at 09:01

## 2018-01-08 RX ADMIN — GABAPENTIN 300 MG: 300 CAPSULE ORAL at 10:01

## 2018-01-08 RX ADMIN — ASPIRIN 81 MG: 81 TABLET, COATED ORAL at 09:01

## 2018-01-08 RX ADMIN — TIMOLOL MALEATE 1 DROP: 5 SOLUTION OPHTHALMIC at 10:01

## 2018-01-08 RX ADMIN — HYDRALAZINE HYDROCHLORIDE 10 MG: 10 TABLET, FILM COATED ORAL at 10:01

## 2018-01-08 RX ADMIN — APIXABAN 2.5 MG: 2.5 TABLET, FILM COATED ORAL at 09:01

## 2018-01-08 RX ADMIN — CARVEDILOL 12.5 MG: 12.5 TABLET, FILM COATED ORAL at 09:01

## 2018-01-08 RX ADMIN — INSULIN ASPART 3 UNITS: 100 INJECTION, SOLUTION INTRAVENOUS; SUBCUTANEOUS at 11:01

## 2018-01-08 RX ADMIN — CARVEDILOL 12.5 MG: 12.5 TABLET, FILM COATED ORAL at 10:01

## 2018-01-08 RX ADMIN — AMLODIPINE BESYLATE 2.5 MG: 2.5 TABLET ORAL at 09:01

## 2018-01-08 RX ADMIN — INSULIN DETEMIR 5 UNITS: 100 INJECTION, SOLUTION SUBCUTANEOUS at 09:01

## 2018-01-08 RX ADMIN — HYDRALAZINE HYDROCHLORIDE 10 MG: 10 TABLET, FILM COATED ORAL at 03:01

## 2018-01-08 RX ADMIN — APIXABAN 2.5 MG: 2.5 TABLET, FILM COATED ORAL at 10:01

## 2018-01-08 RX ADMIN — TAMSULOSIN HYDROCHLORIDE 0.4 MG: 0.4 CAPSULE ORAL at 09:01

## 2018-01-08 RX ADMIN — OXYCODONE HYDROCHLORIDE 10 MG: 5 TABLET ORAL at 08:01

## 2018-01-08 NOTE — PT/OT/SLP PROGRESS
"Physical Therapy  Treatment    Joseph Valerio   MRN: [id number]   Admitting Diagnosis: Pneumonia of right lower lobe due to infectious organism    PT Received On: 01/08/18  Total Time (min): 45       Billable Minutes:45    Gait Training 15, Therapeutic Activity 15 and Therapeutic Exercise 15    Treatment Type: Treatment  PT/PTA: PTA     PTA Visit Number: 4       General Precautions: Standard, fall  Orthopedic Precautions: N/A   Braces: N/A    Do you have any cultural, spiritual, Mandaen conflicts, given your current situation?: none reported    Subjective:   "Good Babe except my knees always hurts" agreeable to therapy      Pain/Comfort  Pain Rating 1: 8/10  Location - Side 1: Bilateral  Location - Orientation 1: generalized  Location 1: knee  Pain Addressed 1: Nurse notified, Other (see comments) (meds requested/given during session)  Pain Rating Post-Intervention 1: 4/10 (inc with mobility/dec with rest)    Objective:   Patient found with:  (in wc)       Functional Status:  MDS G  Bed Mobility Functional Status: mod(I) - (I)  Transfer Functional Status: S-SBA  Walk in Room Functional Status: S-SBA  Walk in Corridor Functional Status: S-SBA          AM-PAC 6 CLICK MOBILITY  Total Score:23    Bed Mobility:  Sit>Supine:mod I in bed HOB flat no rail  Supine>Sit: mod I in bed HOB flat no rail    Transfers:  Sit<>Stand: S with RW  Stand Pivot Transfer: S with RW in room WC<>EOB ~ 5 ft x 2    Gait:  Amb with RW S 150 ft no LOB     Advanced Gait:  Curb Step: asc/amy 4" curb with RW SBA    Therex:  2x15 reps AP,LAQ,hip flex    Additional Treatment:  LBE x 15   toileting S with RW for trfs, S/mod I with clothing mgmt and aurelia care    Patient left up in chair with call button in reach and belongings in reach.    Assessment:  Joseph Valerio is a 82 y.o. male with a medical diagnosis of Pneumonia of right lower lobe due to infectious organism.  Pt tolerated well, pt would continue to benefit from skilled PT services to " improve overall functional mobility, strength and endurance.  .    Rehab identified problem list/impairments: weakness, impaired endurance, impaired self care skills, impaired functional mobilty, gait instability, impaired balance    Rehab potential is good.    Activity tolerance: Fair    Discharge recommendations: home with home health     Barriers to discharge: Decreased caregiver support    Equipment recommendations: bedside commode, bath bench     GOALS:    Physical Therapy Goals        Problem: Physical Therapy Goal    Goal Priority Disciplines Outcome Goal Variances Interventions   Physical Therapy Goal     PT/OT, PT Ongoing (interventions implemented as appropriate)     Description:  Goals to be met by: 14 days   3Patient will increase functional independence with mobility by performin. Supine to sit with Modified [place] met  2. Sit to supine with Modified [place] met  3. Sit to stand transfer with Supervision met  4. Bed to chair transfer with Supervision using Rolling Walker met  5. Gait  x 150 feet with Supervision using Rolling Walker. met  6. Ascend/Descend 4 inch curb step with Stand-by Assistance using Rolling Walker. met  7. Stand for 3 minutes with Stand-by Assistance using Rolling Walker while performing dynamic standing activity.  8. Lower extremity exercise program x20 reps per handout, with assistance as needed met  9. Pt will improve TUG time to 20s or less in order to decrease fall risk                       PLAN:    Patient to be seen  (5-6X/week)  to address the above listed problems via gait training, therapeutic activities, therapeutic exercises  Plan of Care expires: 18  Plan of Care reviewed with: patient    Laura Segovia, PTA  2018

## 2018-01-08 NOTE — PHYSICIAN QUERY
PT Name: Joseph Valerio  MR #: [id number]     Physician Query Form - Documentation Clarification      CDS/: Cely Beckham               Contact information:[email or web address]    This form is a permanent document in the medical record.     Query Date: January 8, 2018    By submitting this query, we are merely seeking further clarification of documentation. Please utilize your independent clinical judgment when addressing the question(s) below.    The Medical record reflects the following:    Supporting Clinical Findings Location in Medical Record     Type 2 diabetes mellitus with stage 3 chronic kidney disease, with long-term current use of insulin    Check blood glucose AC and HS and use SSI.  A1c 5.6% on 12/13/17. Pt states that he takes Lantus 25 units daily at home. Hold here for now to minimize risk of hypoglycemia.   Diabetic diet.            H&P 12/26   Glucose = 169---->112----->179    POCT Glucose = 197--->234---->196--->201   Labs 12/26-12/29    POCT tests 12/26-12/29                                                                            Doctor, Please specify diagnosis or diagnoses associated with above clinical findings.    Provider Use Only        [ x  ]  DM2 with hyperglycemia    [   ]  DM2 with hypoglycemia    [   ]  Other explanations with details____________________________________                                                                                                             [  ] Clinically undetermined

## 2018-01-08 NOTE — PLAN OF CARE
Problem: Physical Therapy Goal  Goal: Physical Therapy Goal  Goals to be met by: 14 days   3Patient will increase functional independence with mobility by performin. Supine to sit with Modified [place] met  2. Sit to supine with Modified [place] met  3. Sit to stand transfer with Supervision met  4. Bed to chair transfer with Supervision using Rolling Walker met  5. Gait  x 150 feet with Supervision using Rolling Walker. met  6. Ascend/Descend 4 inch curb step with Stand-by Assistance using Rolling Walker. met  7. Stand for 3 minutes with Stand-by Assistance using Rolling Walker while performing dynamic standing activity.  8. Lower extremity exercise program x20 reps per handout, with assistance as needed met  9. Pt will improve TUG time to 20s or less in order to decrease fall risk     Outcome: Ongoing (interventions implemented as appropriate)  Goals remain appropriate

## 2018-01-08 NOTE — PHYSICIAN QUERY
PT Name: Joseph Valerio  MR #: [id number]    Physician Query Form - Neurological Condition Clarification       CDS/: Cely Beckham               Contact information:[email or web address]    This form is a permanent document in the medical record.     Query Date: January 8, 2018    By submitting this query, we are merely seeking further clarification of documentation. Please utilize your independent clinical judgment when addressing the question(s) below.    The Medical record contains the following:   Indicators   Supporting Clinical Findings Location in Medical Record   x AMS, Confusion, LOC, etc. EMS called out to pt's home by son for altered mental status. Patient lives alone. Son states pt. did not recognize him around 1900 last night which was unusual for patient. Pt has been progressively forgetful and disoriented   Nursing ED Notes 12/26   x Acute / Chronic Illness PNA  Acute resp failure with hypoxia  JOHN PAUL  CKD 3-4  DM2   H&P 12/26    Radiology Findings     x Electrolyte Imbalance Calcium = 8.0 Labs 12/26    Medication      Treatment     x Other Opiates presumptive positive  UDS 12/26     Provider, please specify the diagnosis or diagnoses associated with above clinical findings.      [x] Metabolic Encephalopathy    [  ] Toxic Encephalopathy    [  ] Other Encephalopathy    [  ] Unspecified Encephalopathy    [  ] Other (please specify): _____________________________________    [  ] Clinically Undetermined      Please document in your progress notes daily for the duration of treatment until resolved, and  include in your discharge summary.

## 2018-01-08 NOTE — PROGRESS NOTES
"[facility]  Adult Nutrition  Progress Note    SUMMARY     Recommendations    Recommendation/Intervention: Continue 2000 diabetic diet, pt declines diabetic teaching, RD to follow  Goals: PO >85% of needs  Nutrition Goal Status: goal met  Push fluids           Reason for Assessment    Reason for Assessment: RD follow-up         Interdisciplinary Rounds: attended     General Information Comments: %     Nutrition Discharge Planning: Cardiac Diabetic diet     Nutrition Prescription Ordered    Current Diet Order: 2000 diabetic cardiac  Nutrition Order Comments: push fluids, edema in lower legs continues           Oral Nutrition Supplement: none     Evaluation of Received Nutrients/Fluid Intake                                           Energy Calories Required: meeting needs                 Protein Required: meeting needs                              Fluid Required: not meeting needs  Comments: push fluids  Tolerance: tolerating     % Intake of Estimated Energy Needs: 75 - 100 %  % Meal Intake: 100%     Nutrition Risk Screen     Nutrition Risk Screen: no indicators present    Nutrition/Diet History       Typical Food/Fluid Intake: TODD  Food Preferences: no preferences patient just reports he is eating smaller amounts these days, family states less fast food                         Labs/Tests/Procedures/Meds       Pertinent Labs Reviewed: reviewed, pertinent  Pertinent Labs Comments: gluc 146, Na 146. BUN 37, Cr 1.6  Pertinent Medications Reviewed: reviewed, pertinent  Pertinent Medications Comments: lasix    Physical Findings    Overall Physical Appearance: obese (per BMI)     Oral/Mouth Cavity:  (teeth missing)  Skin:  (Manan Score 19, wound skin tear lower arm, pressure ulcer)    Anthropometrics    Temp: 97.5 °F (36.4 °C)     Height: 5' 10" (177.8 cm)  Weight Method: Standard Scale  Weight: 101.9 kg (224 lb 10.4 oz)  Ideal Body Weight (IBW), Male: 166 lb     % Ideal Body Weight, Male (lb): " 139.45 lb     BMI (Calculated): 33.3  BMI Grade: 30 - 34.9- obesity - grade I  Weight Loss:  (weight loss gradual , pt on lasix with good po intake, )                         Estimated/Assessed Needs    Weight Used For Calorie Calculations: 105 kg (231 lb 7.7 oz)   Height (cm): 177.8 cm  Energy Calorie Requirements (kcal): 1761- 2113  Energy Need Method: Cloud-St Jeor (x1- 1.2 )       RMR (Cloud-St. Jeor Equation): 1761.25        Weight Used For Protein Calculations: 105 kg (231 lb 7.7 oz)          Fluid Need Method: RDA Method (or per MD)        RDA Method (mL): 1761         CHO Requirement: 250 g     Assessment and Plan    Type 2 diabetes mellitus with stage 3 chronic kidney disease, with long-term current use of insulin    Nutrition problem  Altered nutrition related laboratory values    Related to (etiology):   DM    Signs and Symptoms (as evidenced by):     Recent Labs  Lab 12/30/17  0734   POCTGLUCOSE 162*       Interventions/Recommendations (treatment strategy):  See above    Nutrition Diagnosis Status:   New          Insulin adjusted     Monitor and Evaluation    Food and Nutrient Intake: energy intake  Food and Nutrient Adminstration: diet order  Knowledge/Beliefs/Attitudes: food and nutrition knowledge/skill        Biochemical Data, Medical Tests and Procedures: electrolyte and renal panel, glucose/endocrine profile  Nutrition-Focused Physical Findings: overall appearance, skin    Nutrition Risk    Level of Risk:  (follow one time per week)    Nutrition Follow-Up    RD Follow-up?: Yes

## 2018-01-08 NOTE — PLAN OF CARE
Problem: Diabetes, Type 2 (Adult)  Goal: Signs and Symptoms of Listed Potential Problems Will be Absent, Minimized or Managed (Diabetes, Type 2)  Signs and symptoms of listed potential problems will be absent, minimized or managed by discharge/transition of care (reference Diabetes, Type 2 (Adult) CPG).   Outcome: Ongoing (interventions implemented as appropriate)   01/08/18 0212   Diabetes, Type 2   Problems Assessed (Type 2 Diabetes) hyperglycemia;hypoglycemia;situational response       Problem: Fall Risk (Adult)  Goal: Identify Related Risk Factors and Signs and Symptoms  Related risk factors and signs and symptoms are identified upon initiation of Human Response Clinical Practice Guideline (CPG)   Outcome: Ongoing (interventions implemented as appropriate)   01/08/18 0212   Fall Risk   Related Risk Factors (Fall Risk) fatigue/slow reaction;gait/mobility problems

## 2018-01-09 PROBLEM — S41.111A SKIN TEAR OF RIGHT UPPER ARM WITHOUT COMPLICATION: Status: ACTIVE | Noted: 2018-01-09

## 2018-01-09 PROBLEM — L89.95 UNSTAGEABLE PRESSURE INJURY OF SKIN AND TISSUE: Status: ACTIVE | Noted: 2018-01-09

## 2018-01-09 LAB
POCT GLUCOSE: 139 MG/DL (ref 70–110)
POCT GLUCOSE: 148 MG/DL (ref 70–110)
POCT GLUCOSE: 180 MG/DL (ref 70–110)
POCT GLUCOSE: 193 MG/DL (ref 70–110)
POCT GLUCOSE: 263 MG/DL (ref 70–110)

## 2018-01-09 PROCEDURE — 12000000 HC SNF SEMI-PRIVATE ROOM

## 2018-01-09 PROCEDURE — 25000003 PHARM REV CODE 250: Performed by: NURSE PRACTITIONER

## 2018-01-09 PROCEDURE — 25000003 PHARM REV CODE 250: Performed by: INTERNAL MEDICINE

## 2018-01-09 PROCEDURE — 25000003 PHARM REV CODE 250: Performed by: HOSPITALIST

## 2018-01-09 RX ADMIN — APIXABAN 2.5 MG: 2.5 TABLET, FILM COATED ORAL at 09:01

## 2018-01-09 RX ADMIN — TIMOLOL MALEATE 1 DROP: 5 SOLUTION OPHTHALMIC at 09:01

## 2018-01-09 RX ADMIN — CARVEDILOL 12.5 MG: 12.5 TABLET, FILM COATED ORAL at 08:01

## 2018-01-09 RX ADMIN — CARVEDILOL 12.5 MG: 12.5 TABLET, FILM COATED ORAL at 09:01

## 2018-01-09 RX ADMIN — INSULIN ASPART 1 UNITS: 100 INJECTION, SOLUTION INTRAVENOUS; SUBCUTANEOUS at 09:01

## 2018-01-09 RX ADMIN — GABAPENTIN 300 MG: 300 CAPSULE ORAL at 09:01

## 2018-01-09 RX ADMIN — TRAVOPROST 1 DROP: 0.04 SOLUTION/ DROPS OPHTHALMIC at 09:01

## 2018-01-09 RX ADMIN — HYDRALAZINE HYDROCHLORIDE 10 MG: 10 TABLET, FILM COATED ORAL at 09:01

## 2018-01-09 RX ADMIN — OXYCODONE HYDROCHLORIDE 10 MG: 5 TABLET ORAL at 11:01

## 2018-01-09 RX ADMIN — TAMSULOSIN HYDROCHLORIDE 0.4 MG: 0.4 CAPSULE ORAL at 08:01

## 2018-01-09 RX ADMIN — LOSARTAN POTASSIUM 100 MG: 50 TABLET, FILM COATED ORAL at 08:01

## 2018-01-09 RX ADMIN — OXYCODONE HYDROCHLORIDE 10 MG: 5 TABLET ORAL at 09:01

## 2018-01-09 RX ADMIN — ASPIRIN 81 MG: 81 TABLET, COATED ORAL at 08:01

## 2018-01-09 RX ADMIN — RAMELTEON 8 MG: 8 TABLET, FILM COATED ORAL at 09:01

## 2018-01-09 RX ADMIN — APIXABAN 2.5 MG: 2.5 TABLET, FILM COATED ORAL at 08:01

## 2018-01-09 RX ADMIN — AMLODIPINE BESYLATE 2.5 MG: 2.5 TABLET ORAL at 08:01

## 2018-01-09 NOTE — PLAN OF CARE
01/09/2018  10:34 AM     01/09/18 1034   Medicare Message   Important Message from Medicare regarding Discharge Appeal Rights Given to patient/caregiver;Explained to patient/caregiver;Signed/date by patient/caregiver   CLARE served NOMNC to patient yesterday (1/8/18) notifying of discharge date of 1/11/18 and appeal right.  Patient expressed understanding and satisfaction regarding discharge date.  Patient signed NOMNC, and SW provided patient with copy.  CLARE placed original in patient's blue chart in nurse's station.    Pt plans to return home upon SNF d/c where he lives alone in a 1  with threshold DENISE.  Pt has a tub/shower combo with grab bar and owns a RW and SPC.  Pt reports having 2 sons who work shift work but stop by and check on him at home when available.  Pt denies having preference for home health placement.  CLARE gave pt Senior Resource Guide with sitter agency list to hire additional caregiver assistance if needed.  CLARE called pt's son (Joseph Mosley. [phone]) and scheduled PT/OT family training for Joseph Mosley for Wednesday 1/10/18 at 2 pm.  Pt/son deny having any questions or concerns regarding d/c plan at this time.  CLARE remains available for further d/c planning assistance and will f/u as needed.    Krystian Gale, CLARE  [id number]

## 2018-01-09 NOTE — PLAN OF CARE
Problem: Patient Care Overview  Goal: Plan of Care Review  Outcome: Ongoing (interventions implemented as appropriate)  AAAOx4. Patient is on air mattress for comfort measures and pressure sore. Stage 2 sacral pressure injury barrier cream applied by wound care nurse. Skin tear on right upper extremity and right shoulder foam dressing changed and applied by wound care nurse. Wedge pillow placed. Some complaints of pain to left and right knee and is managed with pain medication. Safety measures maintained patient instructed to press call light for help to restroom and getting up. Acu check AC and HS blood glucose maintained with regular and long acting insulin.

## 2018-01-09 NOTE — PROGRESS NOTES
Wound care follow-up:  The right shoulder has 50% slough noted with pink/moist tissue noted.  The right forearm partial thickness skin tear has red/moist tissue noted.  The buttocks evolving DTI has pink moist tissue noted to bilateral buttocks with darkened blanchable intact aurelia-wound skin noted.  Recommend using Triad ointment to buttocks BID and prn- Plan of care discussed with patient who verbalized understanding.     01/09/18 0750       Wound 12/29/17 1800 Skin tear lower arm   Date First Assessed/Time First Assessed: 12/29/17 1800   Pre-existing: Yes  Wound Type: Skin tear  Side: Right  Orientation: lower  Location: arm  Wound Length (cm): 6  Wound Width (cm): 2.5   Wound Image    Wound WDL ex   Dressing Appearance intact;dry   Drainage Amount none   Wound Base moist;reddened   Periwound Area ecchymotic   Wound Edges open;jagged   Wound Length (cm) 1.5   Wound Width (cm) 1.5   Depth (cm) 0.1   Non-staged Wound Description Partial thickness   Cleansed W/ sterile normal saline   Dressing Dressing changed;foam   Dressing Change Due 01/12/18       Pressure Ulcer 12/27/17 1915 sacral spine suspected deep tissue injury   Date First Assessed/Time First Assessed: 12/27/17 1915   Pressure Ulcer Present on Admission: other (see comments)  Location: sacral spine  Staging: suspected deep tissue injury  Wound Length (cm): 10  Wound Width (cm): 14   Wound Image    Staging suspected deep tissue injury   Healing Pressure Ulcer yes   Pressure Ulcer Risk Factors moisture;shear/friction;nutrition;mobility   Dressing Appearance no dressing   Drainage Amount small   Appearance pink;moist;clean   Periwound Area ecchymotic   Wound Edges open   Wound Length (cm) (both cheeks combined= 8)   Wound Width (cm) (both cheeks combined= 10)   Depth (cm) 0.1   Cleansed W/ sterile normal saline   Interventions barrier applied  (Triad ointment )       Pressure Ulcer 12/29/17 1730 Right shoulder Unstageable   Date First Assessed/Time First  Assessed: 12/29/17 4990   Pressure Ulcer Present on Admission: yes  Side: Right  Location: shoulder  Staging: Unstageable  Wound Length (cm): 2  Wound Width (cm): 4   Wound Image    Staging Unstageable   Healing Pressure Ulcer yes   Pressure Ulcer Risk Factors moisture;shear/friction;nutrition;mobility   Dressing Appearance intact;dry   Drainage Amount scant   Drainage Characteristics/Odor creamy   Appearance pink;moist;clean;slough  (slough covers 50% wound bed)   Periwound Area normal skin tone   Wound Edges open;jagged   Wound Length (cm) 2   Wound Width (cm) 4   Depth (cm) 0.1   Cleansed W/ wound cleanser   Dressing Dressing changed;hydrocolloid   Dressing Change Due 01/16/18   Skin Interventions   Pressure Reduction Devices chair cushion utilized;pressure-redistributing mattress utilized;positioning supports utilized   Pressure Reduction Techniques pressure points protected;frequent weight shift encouraged;positioned off wounds

## 2018-01-09 NOTE — PT/OT/SLP PROGRESS
Physical Therapy      Patient Name:  Joseph Valerio   MRN:  [id number]    Patient not seen today secondary to patient refusal x2 attempts. Patient reports he is tired and just returned to bed and is not getting up again.  . Will follow-up tomorrow. Patient has family training scheduled. .    Nelli John, PT

## 2018-01-09 NOTE — TREATMENT PLAN
Rehab Services' DME recommendations    Joseph Valerio  MRN: [id number]      Patient reports he has a RW and does not need additional DME. Will verify DME needs w/ patient and family at training on 1/10/18. MC    [] 3 in 1 commode Standard  Or heavy duty if patient prefers      [x] Shower Chair With back     [x] Home health PT and OT        Nelli John, PT 1/9/2018

## 2018-01-10 LAB
POCT GLUCOSE: 125 MG/DL (ref 70–110)
POCT GLUCOSE: 137 MG/DL (ref 70–110)
POCT GLUCOSE: 181 MG/DL (ref 70–110)
POCT GLUCOSE: 228 MG/DL (ref 70–110)

## 2018-01-10 PROCEDURE — 97116 GAIT TRAINING THERAPY: CPT

## 2018-01-10 PROCEDURE — 25000003 PHARM REV CODE 250: Performed by: HOSPITALIST

## 2018-01-10 PROCEDURE — 97530 THERAPEUTIC ACTIVITIES: CPT

## 2018-01-10 PROCEDURE — 12000000 HC SNF SEMI-PRIVATE ROOM

## 2018-01-10 PROCEDURE — 25000003 PHARM REV CODE 250: Performed by: INTERNAL MEDICINE

## 2018-01-10 PROCEDURE — 25000003 PHARM REV CODE 250: Performed by: NURSE PRACTITIONER

## 2018-01-10 RX ORDER — AMLODIPINE BESYLATE 2.5 MG/1
2.5 TABLET ORAL DAILY
Qty: 30 TABLET | Refills: 3 | Status: SHIPPED | OUTPATIENT
Start: 2018-01-11 | End: 2019-01-11

## 2018-01-10 RX ORDER — ASPIRIN 81 MG/1
81 TABLET ORAL DAILY
Refills: 0 | COMMUNITY
Start: 2018-01-11 | End: 2019-01-11

## 2018-01-10 RX ORDER — INSULIN GLARGINE 100 [IU]/ML
INJECTION, SOLUTION SUBCUTANEOUS
Qty: 15 ML | Refills: 0
Start: 2018-01-10

## 2018-01-10 RX ORDER — AMOXICILLIN 250 MG
1 CAPSULE ORAL 2 TIMES DAILY
COMMUNITY
Start: 2018-01-10

## 2018-01-10 RX ORDER — HYDRALAZINE HYDROCHLORIDE 10 MG/1
10 TABLET, FILM COATED ORAL EVERY 8 HOURS
Qty: 90 TABLET | Refills: 3 | Status: SHIPPED | OUTPATIENT
Start: 2018-01-10 | End: 2019-01-10

## 2018-01-10 RX ORDER — CARVEDILOL 12.5 MG/1
12.5 TABLET ORAL 2 TIMES DAILY
Qty: 60 TABLET | Refills: 3 | Status: SHIPPED | OUTPATIENT
Start: 2018-01-10 | End: 2019-01-10

## 2018-01-10 RX ORDER — OXYCODONE HYDROCHLORIDE 5 MG/1
5 TABLET ORAL EVERY 8 HOURS PRN
Qty: 15 TABLET | Refills: 0 | Status: SHIPPED | OUTPATIENT
Start: 2018-01-10

## 2018-01-10 RX ADMIN — STANDARDIZED SENNA CONCENTRATE AND DOCUSATE SODIUM 1 TABLET: 8.6; 5 TABLET, FILM COATED ORAL at 09:01

## 2018-01-10 RX ADMIN — CARVEDILOL 12.5 MG: 12.5 TABLET, FILM COATED ORAL at 09:01

## 2018-01-10 RX ADMIN — HYDRALAZINE HYDROCHLORIDE 10 MG: 10 TABLET, FILM COATED ORAL at 05:01

## 2018-01-10 RX ADMIN — INSULIN ASPART 1 UNITS: 100 INJECTION, SOLUTION INTRAVENOUS; SUBCUTANEOUS at 09:01

## 2018-01-10 RX ADMIN — AMLODIPINE BESYLATE 2.5 MG: 2.5 TABLET ORAL at 09:01

## 2018-01-10 RX ADMIN — GABAPENTIN 300 MG: 300 CAPSULE ORAL at 09:01

## 2018-01-10 RX ADMIN — HYDRALAZINE HYDROCHLORIDE 10 MG: 10 TABLET, FILM COATED ORAL at 03:01

## 2018-01-10 RX ADMIN — APIXABAN 2.5 MG: 2.5 TABLET, FILM COATED ORAL at 09:01

## 2018-01-10 RX ADMIN — HYDRALAZINE HYDROCHLORIDE 10 MG: 10 TABLET, FILM COATED ORAL at 09:01

## 2018-01-10 RX ADMIN — TRAVOPROST 1 DROP: 0.04 SOLUTION/ DROPS OPHTHALMIC at 09:01

## 2018-01-10 RX ADMIN — FUROSEMIDE 40 MG: 40 TABLET ORAL at 09:01

## 2018-01-10 RX ADMIN — OXYCODONE HYDROCHLORIDE 10 MG: 5 TABLET ORAL at 03:01

## 2018-01-10 RX ADMIN — TAMSULOSIN HYDROCHLORIDE 0.4 MG: 0.4 CAPSULE ORAL at 09:01

## 2018-01-10 RX ADMIN — RAMELTEON 8 MG: 8 TABLET, FILM COATED ORAL at 09:01

## 2018-01-10 RX ADMIN — ASPIRIN 81 MG: 81 TABLET, COATED ORAL at 09:01

## 2018-01-10 RX ADMIN — TIMOLOL MALEATE 1 DROP: 5 SOLUTION OPHTHALMIC at 09:01

## 2018-01-10 RX ADMIN — LOSARTAN POTASSIUM 100 MG: 50 TABLET, FILM COATED ORAL at 09:01

## 2018-01-10 NOTE — PLAN OF CARE
Problem: Fall Risk (Adult)  Goal: Absence of Falls  Patient will demonstrate the desired outcomes by discharge/transition of care.   Outcome: Ongoing (interventions implemented as appropriate)   01/09/18 2000   Fall Risk (Adult)   Absence of Falls making progress toward outcome   Pt remain free from falls/injury/trauma. Call light w/i reach. Will monitor

## 2018-01-10 NOTE — PLAN OF CARE
Problem: Physical Therapy Goal  Goal: Physical Therapy Goal  Goals to be met by: 14 days   3Patient will increase functional independence with mobility by performin. Supine to sit with Modified [place] met  2. Sit to supine with Modified [place] met  3. Sit to stand transfer with Supervision met  4. Bed to chair transfer with Supervision using Rolling Walker met  5. Gait  x 150 feet with Supervision using Rolling Walker. met  6. Ascend/Descend 4 inch curb step with Stand-by Assistance using Rolling Walker. met  7. Stand for 3 minutes with Stand-by Assistance using Rolling Walker while performing dynamic standing activity.  8. Lower extremity exercise program x20 reps per handout, with assistance as needed met  9. Pt will improve TUG time to 20s or less in order to decrease fall risk      Goals remain appropriate. Continue with Physical therapy Plan of Care. Nelli John, PT 1/10/2018

## 2018-01-10 NOTE — PT/OT/SLP PROGRESS
Physical Therapy  Treatment    Joseph Valerio   MRN: [id number]   Admitting Diagnosis: Pneumonia of right lower lobe due to infectious organism    PT Received On: 01/10/18          Billable Minutes:  Gait Training 20, Therapeutic Activity 10 and Therapeutic Exercise 0=30    Treatment Type: Treatment  PT/PTA: PT     PTA Visit Number: 0       General Precautions: Standard, fall  Orthopedic Precautions: N/A   Braces: N/A    Do you have any cultural, spiritual, Mormonism conflicts, given your current situation?: none reported    Subjective:  Communicated with patient/ son prior to session.  Son, Joseph, present for FT    Pain/Comfort  Pain Rating 1: 0/10  Location - Side 1: Bilateral  Location 1: knee  Pain Addressed 1: Reposition, Distraction  Pain Rating Post-Intervention 1: 10/10 (w/ walking, resolves w/ sitting)    Objective:  Patient found seated in w/c with         Functional Status:  MDS G  Transfer Functional Status: S-SBA  Walk in Room Functional Status: S-SBA  Walk in Corridor Functional Status: S-SBA  Locomotion on Unit Functional Status: S-SBA  Locomotion Off Unit Functional Status: CGA-Min (A)    MDS GG  Sit to Stand Performance: Supervision or touching assistance.  Chair/bed-to-chair transfer Performance: Supervision or touching assistance.  Chair/bed-to-chair transfer Goal: Supervision or touching assistance.  Toilet transfer Performance: Setup or clean-up assistance  Does the resident walk?: Yes --> Continue to Walk 50 feet with two turns assessment  Walk 50 feet with two turns Performance: Supervision or touching assistance.  Walk 150 feet Performance: Supervision or touching assistance.     AM-PAC 6 CLICK MOBILITY  Total Score:19    Bed Mobility:  Sit>Supine:NP  Supine>Sit: NP    Transfers:  Sit<>Stand: w/ RW and Supervision from w/c, BSC over toilet.  Stand Pivot Transfer: to BSC over toilet w/ RW and Supervision ; to/from rollator w/ CGA and cues for technique.      Gait:  Amb 150 feet w/ RW and  supervision.   Amb w/ rollator and CGA w/ patient unsteady and decreased control. rollator moveing side to side and patient gradually pushing too far ahead, 45 feet  Advanced Gait:    Curb Step: up/down 4 inch curb step w/ RW and supervision    Wheelchair Mobility:  Patient propels w/c w/ BUEs x30 feet and supervision       Additional Treatment:  Patient used BSC over toilet from . Patient performed posterior aurelia care. He called PT for assist removing sweat pants from around his ankles; He managed his underwear and gym shorts w/o assist.  Son participated in family training and demonstrates understanding of level of assist and supervision needed. Son had requested a rollator, but agrees patient is not safe w/ this AD. Discuss DME recs w/ son and at this time he and patient report do not want BSC or shower chair or w/c.  Patient left up in chair with son present. And nurse present, going to his room.    Assessment:  Joseph Valerio is a 82 y.o. male with a medical diagnosis of Pneumonia of right lower lobe due to infectious organism.  His son participated in family training today. Patient limited by painful B knees w/mobiltiy and refusing some of available recommended DME. Son appears to understand level of supervison/assistance needed and recommended DME. Patient will benefit from continued physical therapy to address deficits and improve safety and functional mobility. Continue with physical therapy plan of care. .    Rehab identified problem list/impairments: weakness, impaired endurance, impaired self care skills, impaired functional mobilty, gait instability, impaired balance    Rehab potential is good.    Activity tolerance: Good    Discharge recommendations: home with home health     Barriers to discharge: Decreased caregiver support    Equipment recommendations: bedside commode, bath bench     GOALS:    Physical Therapy Goals        Problem: Physical Therapy Goal    Goal Priority Disciplines Outcome Goal  Variances Interventions   Physical Therapy Goal     PT/OT, PT Ongoing (interventions implemented as appropriate)     Description:  Goals to be met by: 14 days   3Patient will increase functional independence with mobility by performin. Supine to sit with Modified [place] met  2. Sit to supine with Modified [place] met  3. Sit to stand transfer with Supervision met  4. Bed to chair transfer with Supervision using Rolling Walker met  5. Gait  x 150 feet with Supervision using Rolling Walker. met  6. Ascend/Descend 4 inch curb step with Stand-by Assistance using Rolling Walker. met  7. Stand for 3 minutes with Stand-by Assistance using Rolling Walker while performing dynamic standing activity.  8. Lower extremity exercise program x20 reps per handout, with assistance as needed met  9. Pt will improve TUG time to 20s or less in order to decrease fall risk                       PLAN:    Patient to be seen  (5-6X/week)  to address the above listed problems via gait training, therapeutic activities, therapeutic exercises  Plan of Care expires: 18  Plan of Care reviewed with: patient    Nelli John, PT  01/10/2018

## 2018-01-10 NOTE — PLAN OF CARE
01/10/2018  4:50 PM    SW faxed home health referral (facesheet, H&P, and HH orders) to [facility] (ph [phone] / fx [phone]) as pt denies having preference for HH placement.  SW awaiting confirmation from [facility] of pt's HH acceptance.  Pt/son completed PT/OT family training this afternoon.  Pt to have assistance of 2 sons available around work hours upon d/c.  Pt/son refusing order for BSC and SC at this time.  Pt to have son accompany and transport him home upon SNF d/c tomorrow around 1 pm.  Pt/son denies having any questions or concerns regarding d/c.    Krystian Gale, MANDEEP  [id number]

## 2018-01-10 NOTE — PLAN OF CARE
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.95]  Yes    Skin tear of right upper arm without complication [S41.111A]  Yes    Pressure injury of deep tissue [L89.90]  Yes    Arm wound, left, initial encounter [S41.102A]  Yes    Renovascular hypertension [I15.0]  Yes    Debility [R53.81]  Yes    Stage 3 chronic kidney disease [N18.3]  Yes     Chronic    Chronic anticoagulation - apixaban  [Z79.01]  Not Applicable     Chronic     For AFib      Persistent atrial fibrillation [I48.1]  Yes     Chronic    Benign prostatic hyperplasia [N40.0]  Yes     Chronic    Coronary artery disease involving native coronary artery of native heart without angina pectoris [I25.10]  Yes     Chronic    Obstructive sleep apnea on CPAP [G47.33, Z99.89]  Not Applicable     Chronic    Type 2 diabetes mellitus with stage 3 chronic kidney disease, with long-term current use of insulin [E11.22, N18.3, Z79.4]  Not Applicable     Chronic    Hyperlipidemia [E78.5]  Yes     Chronic    Venous stasis of lower extremity [I87.8]  Yes      Resolved Hospital Problems    Diagnosis Date Resolved POA    *Pneumonia of right lower lobe due to infectious organism [J18.1] 01/03/2018 Yes       Future Appointments  Date Time Provider Department Center   1/17/2018 11:00 AM Thompson Stiles MD [facility] [place]   1/23/2018 2:30 PM Awilda Ho MD [facility]   2/16/2018 4:40 PM Brandon Johansen MD [facility]   3/22/2018 9:30 AM Thompson Stiles MD [facility] [place]     Follow-up Information     Thompson Stiles MD. Go on  1/17/2018.    Specialty:  Internal Medicine  Why:  Hospital follow up  Contact information:  [address]  [facility] [address]  [phone]             Awilda Ho MD. Go on 1/23/2018.    Specialty:  Pulmonary Disease  Why:  Follow up  Contact information:  [address]  [facility] [address]  [phone]             Brandon Johansen MD. Go on 2/16/2018.    Specialties:  Nephrology, Endocrinology  Contact information:  [address]  [facility] [address]  [phone]                     I have seen and examined this patient face to face today. My clinical findings that support the need for the home health skilled services and home bound status are the following:  Weakness/numbness causing balance and gait disturbance due to Infection, Weakness/Debility and PNA making it taxing to leave home.    Allergies:Review of patient's allergies indicates:  No Known Allergies    Diet: cardiac diet and diabetic diet: 2000 calorie    Activities: activity as tolerated    Nursing:   SN to complete comprehensive assessment including routine vital signs. Instruct on disease process and s/s of complications to report to MD. Review/verify medication list sent home with the patient at time of discharge  and instruct patient/caregiver as needed. Frequency may be adjusted depending on start of care date.    Notify MD if SBP > 160 or < 90; DBP > 90 or < 50; HR > 120 or < 50; Temp > 101;       CONSULTS:    Physical Therapy to evaluate and treat. Evaluate for home safety and equipment needs; Establish/upgrade home exercise program. Perform / instruct on therapeutic exercises, gait training, transfer training, and Range of Motion.  Occupational Therapy to evaluate and treat. Evaluate home environment for safety and equipment needs. Perform/Instruct on transfers, ADL training, ROM, and therapeutic exercises.   to evaluate for community resources/long-range planning.  Aide to provide assistance with  personal care, ADLs, and vital signs.    MISCELLANEOUS CARE:  Routine Skin for Bedridden Patients: Instruct patient/caregiver to apply moisture barrier cream to all skin folds and wet areas in perineal area daily and after baths and all bowel movements.  Diabetic Care:   SN to perform and educate Diabetic management with blood glucose monitoring:, Fingerstick blood sugar AC and HS and Report CBG < 60 or > 350 to physician.    WOUND CARE ORDERS  Sacral pressure injury:  Nursing to cleanse sacral area with cleansing cloths and apply Triad ointment ( yellow tube) BID and prn cleaning.    Right shoulder pressure injury: weekly- clean with normal saline, apply Comfeel transparent dressing over wound area.    Right arm skin tear: weekly- clean with normal saline, dress with mepilex foam dressing      Medications: Review discharge medications with patient and family and provide education.      Current Discharge Medication List      START taking these medications    Details   amLODIPine (NORVASC) 2.5 MG tablet Take 1 tablet (2.5 mg total) by mouth once daily.  Qty: 30 tablet, Refills: 3      aspirin (ECOTRIN) 81 MG EC tablet Take 1 tablet (81 mg total) by mouth once daily.  Refills: 0      carvedilol (COREG) 12.5 MG tablet Take 1 tablet (12.5 mg total) by mouth 2 (two) times daily.  Qty: 60 tablet, Refills: 3      hydrALAZINE (APRESOLINE) 10 MG tablet Take 1 tablet (10 mg total) by mouth every 8 (eight) hours.  Qty: 90 tablet, Refills: 3      oxyCODONE (ROXICODONE) 5 MG immediate release tablet Take 1 tablet (5 mg total) by mouth every 8 (eight) hours as needed for Pain.  Qty: 15 tablet, Refills: 0      senna-docusate 8.6-50 mg (PERICOLACE) 8.6-50 mg per tablet Take 1 tablet by mouth 2 (two) times daily.         CONTINUE these medications which have CHANGED    Details   insulin glargine (LANTUS SOLOSTAR) 100 unit/mL (3 mL) InPn pen 9 units SQ in AM for Diabetes  Qty: 15 mL, Refills: 0    Associated Diagnoses: Controlled  type 2 diabetes mellitus with stage 4 chronic kidney disease, with long-term current use of insulin         CONTINUE these medications which have NOT CHANGED    Details   apixaban 2.5 mg Tab Take 1 tablet (2.5 mg total) by mouth 2 (two) times daily.  Qty: 60 tablet, Refills: 11    Associated Diagnoses: Persistent atrial fibrillation      blood sugar diagnostic Strp Check glucose 2-3x/day before meals and/or bedtime  Qty: 300 strip, Refills: 3    Associated Diagnoses: Diabetes mellitus without complication      ferrous sulfate dried (SLOW FE) 160 mg (50 mg iron) TbSR Take 1 tablet (160 mg total) by mouth once daily.  Qty: 30 tablet, Refills: 3    Associated Diagnoses: Iron deficiency      furosemide (LASIX) 40 MG tablet Take 1 tablet (40 mg total) by mouth once daily.  Qty: 90 tablet, Refills: 2    Associated Diagnoses: Essential hypertension      gabapentin (NEURONTIN) 300 MG capsule TAKE 1 CAPSULE BY MOUTH AT BEDTIME FOR DIABETIC NEUROPATHY  Qty: 90 capsule, Refills: 3    Associated Diagnoses: Diabetic polyneuropathy associated with diabetes mellitus due to underlying condition      lancets (ACCU-CHEK SOFTCLIX LANCETS) Misc 1 strip by Misc.(Non-Drug; Combo Route) route 2 (two) times daily.  Qty: 180 each, Refills: 3    Associated Diagnoses: DM (diabetes mellitus)      losartan (COZAAR) 100 MG tablet Take 1 tablet (100 mg total) by mouth once daily.  Qty: 90 tablet, Refills: 3      multivitamin (THERAGRAN) per tablet Take 1 tablet by mouth once daily.      tamsulosin (FLOMAX) 0.4 mg Cp24 Take 1 capsule (0.4 mg total) by mouth once daily. 1 Capsule, Sust. Release 24 hr Oral Every day.  for prostate  Qty: 90 capsule, Refills: 3    Associated Diagnoses: Benign non-nodular prostatic hyperplasia without lower urinary tract symptoms      timolol maleate 0.5% (TIMOPTIC) 0.5 % Drop Place 1 drop into both eyes once daily.       TRAVATAN Z 0.004 % Drop Place 1 drop into both eyes every evening.       calcium-vitamin D 600  mg(1,500mg) -400 unit Tab 1 tab daily  Qty: 30 tablet, Refills: prn    Associated Diagnoses: Chronic renal impairment, stage 4 (severe)      walker Misc 1 Rolling Walker with a Seat  Qty: 1 each, Refills: 0    Associated Diagnoses: Degeneration of lumbar or lumbosacral intervertebral disc         STOP taking these medications       COREG CR 20 mg 24 hr capsule Comments:   Reason for Stopping:         lansoprazole (PREVACID SOLUTAB) 30 MG disintegrating tablet Comments:   Reason for Stopping:         acetaminophen (TYLENOL) 500 MG tablet Comments:   Reason for Stopping:         oxyCODONE (OXYCONTIN) 30 mg TR12 12 hr tablet Comments:   Reason for Stopping:               I certify that this patient is confined to his home and needs physical therapy and occupational therapy.

## 2018-01-10 NOTE — PT/OT/SLP PROGRESS
Occupational Therapy  Treatment    Joseph Valerio   MRN: [id number]   Admitting Diagnosis: Pneumonia of right lower lobe due to infectious organism    OT Date of Treatment: 01/10/18  Total Time (min): 30 min    Billable Minutes:  Therapeutic Activity 30    General Precautions: Standard, fall  Orthopedic Precautions: N/A  Braces: N/A    Do you have any cultural, spiritual, Yarsanism conflicts, given your current situation?: no    Subjective:  Communicated with patient prior to session.    Pain/Comfort  Pain Rating 1:  (patient did not rate)  Location - Side 1: Bilateral  Location - Orientation 1: generalized  Location 1: knee  Pain Addressed 1: Reposition, Distraction  Pain Rating Post-Intervention 1:  (patient did not rate)    Objective:       Functional Status:  MDS G  Bed Mobility Functional Status: S supine>sit  Transfer Functional Status: SBA bed>BSC using RW /c functional ambulation; BSC>w/c using RW /c functional ambulation; w/c<>transfer tub bench using RW and grab bar  Walk in Room Functional Status: SBA  Dressing Functional Status: 1: SBA Patient and son educated on doffing and donning socks and pants using reacher and sock aid to increase independence with ADLs.      MDS GG  Eating Performance: Independent.  Lying to sitting on side of bed Performance: Supervision or touching assistance.  Sit to Stand Performance: Supervision or touching assistance.  Toilet transfer Performance: Supervision or touching assistance.     [facility] 6 Click:  [facility] Total Score: 19    Patient left up in chair with PT    ASSESSMENT:  Joseph Valerio is a 82 y.o. male with a medical diagnosis of Pneumonia of right lower lobe due to infectious organism and presents with the deficits listed below. Patient tolerated treatment session and son was present for family training. Patient and son educated on ADL status and functional transfers. Patient continues to benefit from skilled OT services to achieve maximal independence.    Rehab  identified problem list/impairments: weakness, impaired endurance, impaired self care skills, impaired functional mobilty, gait instability, impaired balance, decreased safety awareness, edema    Rehab potential is good    Activity tolerance: Good    Discharge recommendations: home with home health     Barriers to discharge: Decreased caregiver support     Equipment recommendations: bedside commode, shower chair     GOALS:    Occupational Therapy Goals        Problem: Occupational Therapy Goal    Goal Priority Disciplines Outcome Interventions   Occupational Therapy Goal     OT, PT/OT Ongoing (interventions implemented as appropriate)    Description:  Goals to be met by: 14 days    Patient will increase functional independence with ADLs by performing:    UE Dressing with [place].  LE Dressing with Modified [place].  Grooming while standing with Modified [place].  Toileting from toilet with Modified [place] for hygiene and clothing management.   Bathing from  shower chair/bench with Modified [place].  Toilet transfer to toilet with Modified [place].  Standing tolerance 10 minutes to complete standing ADL and IADL tasks.                      Plan:  Patient to be seen 5 x/week to address the above listed problems via self-care/home management, therapeutic activities, therapeutic exercises  Plan of Care reviewed with: patient    MILLER Jarvis  01/10/2018

## 2018-01-10 NOTE — PLAN OF CARE
Problem: Occupational Therapy Goal  Goal: Occupational Therapy Goal  Goals to be met by: 14 days    Patient will increase functional independence with ADLs by performing:    UE Dressing with [place].  LE Dressing with Modified [place].  Grooming while standing with Modified [place].  Toileting from toilet with Modified [place] for hygiene and clothing management.   Bathing from  shower chair/bench with Modified [place].  Toilet transfer to toilet with Modified [place].  Standing tolerance 10 minutes to complete standing ADL and IADL tasks.     Outcome: Ongoing (interventions implemented as appropriate)  Patient's goals are appropriate.   MILLER Jarvis  1/10/2018

## 2018-01-11 ENCOUNTER — TELEPHONE (OUTPATIENT)
Dept: INTERNAL MEDICINE | Facility: CLINIC | Age: 83
End: 2018-01-11

## 2018-01-11 VITALS
HEIGHT: 70 IN | BODY MASS INDEX: 32.16 KG/M2 | SYSTOLIC BLOOD PRESSURE: 111 MMHG | TEMPERATURE: 98 F | DIASTOLIC BLOOD PRESSURE: 53 MMHG | RESPIRATION RATE: 18 BRPM | OXYGEN SATURATION: 94 % | WEIGHT: 224.63 LBS | HEART RATE: 60 BPM

## 2018-01-11 LAB
ANION GAP SERPL CALC-SCNC: 6 MMOL/L
BASOPHILS # BLD AUTO: 0.02 K/UL
BASOPHILS NFR BLD: 0.3 %
BUN SERPL-MCNC: 54 MG/DL
CALCIUM SERPL-MCNC: 8.3 MG/DL
CHLORIDE SERPL-SCNC: 112 MMOL/L
CO2 SERPL-SCNC: 28 MMOL/L
CREAT SERPL-MCNC: 1.8 MG/DL
DIFFERENTIAL METHOD: ABNORMAL
EOSINOPHIL # BLD AUTO: 0.1 K/UL
EOSINOPHIL NFR BLD: 2 %
ERYTHROCYTE [DISTWIDTH] IN BLOOD BY AUTOMATED COUNT: 14.1 %
EST. GFR  (AFRICAN AMERICAN): 39.9 ML/MIN/1.73 M^2
EST. GFR  (NON AFRICAN AMERICAN): 34.5 ML/MIN/1.73 M^2
GLUCOSE SERPL-MCNC: 99 MG/DL
HCT VFR BLD AUTO: 30.3 %
HGB BLD-MCNC: 8.9 G/DL
LYMPHOCYTES # BLD AUTO: 1.5 K/UL
LYMPHOCYTES NFR BLD: 22 %
MAGNESIUM SERPL-MCNC: 2.3 MG/DL
MCH RBC QN AUTO: 27.5 PG
MCHC RBC AUTO-ENTMCNC: 29.4 G/DL
MCV RBC AUTO: 94 FL
MONOCYTES # BLD AUTO: 0.6 K/UL
MONOCYTES NFR BLD: 9.4 %
NEUTROPHILS # BLD AUTO: 4.4 K/UL
NEUTROPHILS NFR BLD: 66.3 %
PHOSPHATE SERPL-MCNC: 3.9 MG/DL
PLATELET # BLD AUTO: 182 K/UL
PMV BLD AUTO: 12.8 FL
POCT GLUCOSE: 108 MG/DL (ref 70–110)
POCT GLUCOSE: 159 MG/DL (ref 70–110)
POTASSIUM SERPL-SCNC: 4.7 MMOL/L
RBC # BLD AUTO: 3.24 M/UL
SODIUM SERPL-SCNC: 146 MMOL/L
WBC # BLD AUTO: 6.63 K/UL

## 2018-01-11 PROCEDURE — 99316 NF DSCHRG MGMT 30 MIN+: CPT | Mod: ,,, | Performed by: HOSPITALIST

## 2018-01-11 PROCEDURE — 25000003 PHARM REV CODE 250: Performed by: NURSE PRACTITIONER

## 2018-01-11 PROCEDURE — 25000003 PHARM REV CODE 250: Performed by: HOSPITALIST

## 2018-01-11 PROCEDURE — 80048 BASIC METABOLIC PNL TOTAL CA: CPT

## 2018-01-11 PROCEDURE — 97110 THERAPEUTIC EXERCISES: CPT

## 2018-01-11 PROCEDURE — 85025 COMPLETE CBC W/AUTO DIFF WBC: CPT

## 2018-01-11 PROCEDURE — 97535 SELF CARE MNGMENT TRAINING: CPT

## 2018-01-11 PROCEDURE — 84100 ASSAY OF PHOSPHORUS: CPT

## 2018-01-11 PROCEDURE — 83735 ASSAY OF MAGNESIUM: CPT

## 2018-01-11 PROCEDURE — 97116 GAIT TRAINING THERAPY: CPT

## 2018-01-11 PROCEDURE — 36415 COLL VENOUS BLD VENIPUNCTURE: CPT

## 2018-01-11 RX ADMIN — TAMSULOSIN HYDROCHLORIDE 0.4 MG: 0.4 CAPSULE ORAL at 09:01

## 2018-01-11 RX ADMIN — APIXABAN 2.5 MG: 2.5 TABLET, FILM COATED ORAL at 09:01

## 2018-01-11 RX ADMIN — CARVEDILOL 12.5 MG: 12.5 TABLET, FILM COATED ORAL at 09:01

## 2018-01-11 RX ADMIN — ASPIRIN 81 MG: 81 TABLET, COATED ORAL at 09:01

## 2018-01-11 RX ADMIN — HYDRALAZINE HYDROCHLORIDE 10 MG: 10 TABLET, FILM COATED ORAL at 05:01

## 2018-01-11 RX ADMIN — LOSARTAN POTASSIUM 100 MG: 50 TABLET, FILM COATED ORAL at 09:01

## 2018-01-11 RX ADMIN — FUROSEMIDE 40 MG: 40 TABLET ORAL at 09:01

## 2018-01-11 NOTE — PLAN OF CARE
CLARE telephoned Ani in admissions at [facility] to confirm they have accepted the Pt.  Ani reported she will review the packet and call this SW with an answer.

## 2018-01-11 NOTE — ASSESSMENT & PLAN NOTE
no acute issues  continue medical issues ASA, losartan, carvedilol, and furosemide     1/3/18  No acute issues.  Continue ASA, ARB, B-blocker and lasix as ordered by MD.  Denies chest pains at this time.    1/11/18  No acute issues.  Continue ASA, ARB, B-blocker and Lasix and follow up with treating provider after discharge.

## 2018-01-11 NOTE — ASSESSMENT & PLAN NOTE
Plan as above    1/3/18  No acute issues.  Continue treatment with Apixaban.    1/11/18  Treatment as stated above.

## 2018-01-11 NOTE — PT/OT/SLP PROGRESS
Occupational Therapy  Treatment/Discharge Summary    Joseph Valerio   MRN: [id number]   Admitting Diagnosis: Pneumonia of right lower lobe due to infectious organism    OT Date of Treatment: 01/11/18  Total Time (min): 45 min    Billable Minutes:  Self Care/Home Management 15 and Therapeutic Exercise 30    General Precautions: Standard, fall  Orthopedic Precautions: N/A  Braces: N/A    Do you have any cultural, spiritual, Protestant conflicts, given your current situation?: no    Subjective:  Communicated with patient prior to session.    Pain/Comfort  Pain Rating 1:  (did not rate)  Location - Side 1: Bilateral  Location - Orientation 1: generalized  Location 1: knee  Pain Addressed 1: Reposition, Distraction  Pain Rating Post-Intervention 1:  (did not rate)    Objective:       Functional Status:  MDS G  Walk in Room Functional Status: S using RW  Dressing Functional Status: 1: S [place] and donning pullover shirt and pants  Personal Hygiene Functional Status: mod(I) washing hands. Combing hair     MDS GG  Eating Performance: Independent.  Sit to Stand Performance: Supervision or touching assistance.     [facility] 6 Click:  [facility] Total Score: 21    OT Exercises: UE Ergometer 15 min for improving endurance to increase independence with ADLs.   Patient performed B UE ROM exercises using 2# dowel princess 3 x 10 focusing to improve strength and endurance to increase independence with ADLs.     Patient left up in chair with call button in reach and all needs met.     ASSESSMENT:  Joseph Valerio is a 82 y.o. male with a medical diagnosis of Pneumonia of right lower lobe due to infectious organism. Patient was unable to achieve all goals prior to discharge. Patient needs much encouragement to participate in any ADLs. Patient declined standing activities.    GOALS:    Occupational Therapy Goals        Problem: Occupational Therapy Goal    Goal Priority Disciplines Outcome Interventions   Occupational Therapy Goal     OT, PT/OT  Unable to achieve outcome(s) by discharge    Description:  Goals to be met by: 14 days    Patient will increase functional independence with ADLs by performing:    UE Dressing with [place].  LE Dressing with Modified [place].  Grooming while standing with Modified [place]. Met  Toileting from toilet with Modified [place] for hygiene and clothing management.   Bathing from  shower chair/bench with Modified [place].  Toilet transfer to toilet with Modified [place].  Standing tolerance 10 minutes to complete standing ADL and IADL tasks.                       Plan:  Patient was discharged from SNF on this date.   MILLER Jarvis  01/11/2018

## 2018-01-11 NOTE — PROGRESS NOTES
All discharge instructions were given to the patient and family. Patient and family stated that they understood all instructions as they were given. Patient left the unit in a wheelchair propelled per one staff member. Zero S/S of or C/O pain discomfort or SOB

## 2018-01-11 NOTE — ASSESSMENT & PLAN NOTE
BP elevated  BP goal of < 140/90  continue carvedilol, furosemide, and losartan but titrate as needed   hydralazine as needed     1/3/18  BP this morning is 159/71 and HR is 52.  Per MD note, goal BP < 140/90  Currently on Coreg, Lasix and Losartan scheduled and Hydralazine PRN.    Unable to increase coreg due to low HR and Losartan is at max dosing.  Will scheduled Hydralazine and follow.    1/11/18  BP is 111/53 with target BP goal < 140/90.  Currently on Coreg, Lasix, Losartan will continue as ordered.  He was started on low dose hydralazine during admission with improvement in blood pressure readings, will continue at discharge.

## 2018-01-11 NOTE — ASSESSMENT & PLAN NOTE
creatinine at baseline  avoid nephrotoxic agents     1/3/18  Last S. Cr is 1.6.  Continue to monitor biweekly labs.    1/11/18  S.Cr is 1.8, BUN at 54,  Encourage to increase oral fluid intake for next 48 hours.  Repeat BMP next week with results to PCP.

## 2018-01-11 NOTE — PT/OT/SLP PROGRESS
"Physical Therapy  Treatment    Joseph Valerio   MRN: [id number]   Admitting Diagnosis: Pneumonia of right lower lobe due to infectious organism    PT Received On: 01/11/18  Total Time (min): 38       Billable Minutes:38    Gait Training 13 and Therapeutic Exercise 25    Treatment Type: Treatment  PT/PTA: PTA     PTA Visit Number: 1       General Precautions: Standard, fall  Orthopedic Precautions: N/A   Braces: N/A    Do you have any cultural, spiritual, Sikhism conflicts, given your current situation?: none reported    Subjective:.  "alright"      Pain/Comfort  Pain Rating 1: other (see comments) (did not rate)  Location - Side 1: Bilateral  Location - Orientation 1: generalized  Location 1: knee  Pain Addressed 1: Reposition, Distraction, Cessation of Activity (decline needing meds)  Pain Rating Post-Intervention 1:  (inc with mobility, dec with rest)    Objective:  Patient found with:  (in wc)       Functional Status:  MDS G  Transfer Functional Status: S-SBA  Walk in Corridor Functional Status: S-SBA    MDS GG  Sit to Stand Performance: Supervision or touching assistance.  Chair/bed-to-chair transfer Performance: Supervision or touching assistance.  Chair/bed-to-chair transfer Goal: Supervision or touching assistance.  Does the resident walk?: Yes --> Continue to Walk 50 feet with two turns assessment  Walk 50 feet with two turns Performance: Supervision or touching assistance.  Walk 150 feet Performance: Supervision or touching assistance.     AM-PAC 6 CLICK MOBILITY  Total Score:23    Transfers:  Sit<>Stand: S with RW  Stand Pivot Transfer: S with RW      Gait:  Amb with RW S ~ 150 ft no LOB, vcs for erect posture, taking his time for safety    Advanced Gait:  Curb Step: asc/amy 4" curb with RW S vcs for tech/safety    Therex:  2x10 reps AP,GS,LAQ,hip flex,abd/add    Balance:  S with RW    Additional Treatment:  LBE x15 min    Patient left up in chair with call button in reach and belongings in " reach.    Assessment:  Joseph Valerio is a 82 y.o. male with a medical diagnosis of Pneumonia of right lower lobe due to infectious organism.  Pt tolerated well, pt would continue to benefit from skilled PT services to improve overall functional mobility, strength and endurance.  .    Rehab identified problem list/impairments: weakness, impaired endurance, impaired self care skills, impaired functional mobilty, gait instability, impaired balance    Rehab potential is good.    Activity tolerance: Fair    Discharge recommendations: home with home health     Barriers to discharge: Decreased caregiver support    Equipment recommendations: bedside commode, bath bench     GOALS:    Physical Therapy Goals        Problem: Physical Therapy Goal    Goal Priority Disciplines Outcome Goal Variances Interventions   Physical Therapy Goal     PT/OT, PT Ongoing (interventions implemented as appropriate)     Description:  Goals to be met by: 14 days   3Patient will increase functional independence with mobility by performin. Supine to sit with Modified [place] met  2. Sit to supine with Modified [place] met  3. Sit to stand transfer with Supervision met  4. Bed to chair transfer with Supervision using Rolling Walker met  5. Gait  x 150 feet with Supervision using Rolling Walker. met  6. Ascend/Descend 4 inch curb step with Stand-by Assistance using Rolling Walker. met  7. Stand for 3 minutes with Stand-by Assistance using Rolling Walker while performing dynamic standing activity.  8. Lower extremity exercise program x20 reps per handout, with assistance as needed met  9. Pt will improve TUG time to 20s or less in order to decrease fall risk                       PLAN:    Patient to be seen  (5-6X/week)  to address the above listed problems via gait training, therapeutic activities, therapeutic exercises  Plan of Care expires: 18  Plan of Care reviewed with: patient    Lauraarik Jarrellchristy, PTA  2018

## 2018-01-11 NOTE — ASSESSMENT & PLAN NOTE
Low Na diet  CHARO hose  Leg elevation     1/3/18  Chronic, continue medical plan outlined by MD.    Patient to follow up with treating provider after discharge, no acute issues.    1/11/18  Chronic, follow up with PCP for long-term management.

## 2018-01-11 NOTE — PLAN OF CARE
Problem: Physical Therapy Goal  Goal: Physical Therapy Goal  Goals to be met by: 14 days   3Patient will increase functional independence with mobility by performin. Supine to sit with Modified [place] met  2. Sit to supine with Modified [place] met  3. Sit to stand transfer with Supervision met  4. Bed to chair transfer with Supervision using Rolling Walker met  5. Gait  x 150 feet with Supervision using Rolling Walker. met  6. Ascend/Descend 4 inch curb step with Stand-by Assistance using Rolling Walker. met  7. Stand for 3 minutes with Stand-by Assistance using Rolling Walker while performing dynamic standing activity.  8. Lower extremity exercise program x20 reps per handout, with assistance as needed met  9. Pt will improve TUG time to 20s or less in order to decrease fall risk      Outcome: Ongoing (interventions implemented as appropriate)  Goals remain appropriate

## 2018-01-11 NOTE — ASSESSMENT & PLAN NOTE
blood glucoses well controlled well controlled   home dose of glargine held as his A1C with WNL   continue with accuchecks and SSI     1/3/18  Lab Results   Component Value Date    HGBA1C 5.6 12/13/2017     POCT Glucose   Date Value Ref Range Status   01/11/2018 159 (H) 70 - 110 mg/dL Final   01/11/2018 108 70 - 110 mg/dL Final   01/10/2018 228 (H) 70 - 110 mg/dL Final   01/10/2018 137 (H) 70 - 110 mg/dL Final   01/10/2018 181 (H) 70 - 110 mg/dL Final   01/10/2018 125 (H) 70 - 110 mg/dL Final   01/09/2018 263 (H) 70 - 110 mg/dL Final   01/09/2018 139 (H) 70 - 110 mg/dL Final   01/09/2018 148 (H) 70 - 110 mg/dL Final   01/09/2018 193 (H) 70 - 110 mg/dL Final   01/08/2018 180 (H) 70 - 110 mg/dL Final   01/08/2018 140 (H) 70 - 110 mg/dL Final     Hgba1c is normal, continue diet restriction and Novolog per SSI as outlined by MD and follow.    1/11/18  Patient started on low dose Levemir by physician over weekend.  Since then his CBG has waxed and waned.  He takes lantus 25 units at home daily.  He is currently on Levemir 7 units but will change to 9 units qhs at discharge.  Recommend he follow a diabetic diet and to keep record of his blood sugar.  He is to notify his PCP/endocrinologist for any issues.

## 2018-01-11 NOTE — ASSESSMENT & PLAN NOTE
Cont home statin to treat     1/3/18  Chronic, no acute issues, review of chart he is not on statin therapy.  Lipid panel from Dec 2017 reviewed and appears normal.    1/11/18  Chronic, he will follow up with his PCP after discharge.  He is not on any lipid lowering agents at this time, lipid panel from Dec 2017 appears normal.

## 2018-01-11 NOTE — PLAN OF CARE
Problem: Occupational Therapy Goal  Goal: Occupational Therapy Goal  Goals to be met by: 14 days    Patient will increase functional independence with ADLs by performing:    UE Dressing with Marion.  LE Dressing with Modified Marion.  Grooming while standing with Modified [place]. Met  Toileting from toilet with Modified Marion for hygiene and clothing management.   Bathing from  shower chair/bench with Modified [place].  Toilet transfer to toilet with Modified [place].  Standing tolerance 10 minutes to complete standing ADL and IADL tasks.     Outcome: Unable to achieve outcome(s) by discharge  Patient was unable to achieve all goals.  MILLER Jarvis  1/11/2018

## 2018-01-11 NOTE — PLAN OF CARE
Problem: Diabetes, Type 2 (Adult)  Goal: Signs and Symptoms of Listed Potential Problems Will be Absent, Minimized or Managed (Diabetes, Type 2)  Signs and symptoms of listed potential problems will be absent, minimized or managed by discharge/transition of care (reference Diabetes, Type 2 (Adult) CPG).   Outcome: Ongoing (interventions implemented as appropriate)   01/10/18 1813   Diabetes, Type 2   Problems Assessed (Type 2 Diabetes) all   Problems Present (Type 2 Diabetes) none       Problem: Pressure Ulcer Risk (Manan Scale) (Adult,Obstetrics,Pediatric)  Goal: Identify Related Risk Factors and Signs and Symptoms  Related risk factors and signs and symptoms are identified upon initiation of Human Response Clinical Practice Guideline (CPG)   Outcome: Ongoing (interventions implemented as appropriate)   01/10/18 1813   Pressure Ulcer Risk (Manan Scale)   Related Risk Factors (Pressure Ulcer Risk (Manan Scale)) age extremes;mobility impaired;medication   Pt needs minimal assistance with transfers. Pt ambulates using walker. Remains afebrile. Pt monitored for fall. No falls noted during shift informed pt to call staff for transfers. Pt turn and repositioned Q 2H. Wound care completed. Safety Maintained.     Problem: Pressure Ulcer (Adult)  Goal: Signs and Symptoms of Listed Potential Problems Will be Absent, Minimized or Managed (Pressure Ulcer)  Signs and symptoms of listed potential problems will be absent, minimized or managed by discharge/transition of care (reference Pressure Ulcer (Adult) CPG).   Outcome: Ongoing (interventions implemented as appropriate)   01/10/18 1813   Pressure Ulcer   Problems Assessed (Pressure Ulcer) all   Problems Present (Pressure Ulcer) none

## 2018-01-11 NOTE — PLAN OF CARE
CLARE Law called from [facility] to confirm they will accept the Pt.

## 2018-01-11 NOTE — TELEPHONE ENCOUNTER
----- Message from Frida Denney sent at 1/11/2018 11:07 AM CST -----  Contact: [facility] Ani [phone]  Ani would like a call back regarding if Dr Stiles would be the doctor to over see the patients home health services.

## 2018-01-11 NOTE — ASSESSMENT & PLAN NOTE
does not use CPAP at home   O2 as needed   Follow up as outpatient with sleep medicine     1/3/18  Per MD note, follow up with sleep clinic for sleep study.      1/11/18  Patient will need ambulatory referral to sleep clinic for sleep study, PCP to arrange.

## 2018-01-11 NOTE — ASSESSMENT & PLAN NOTE
continue tamsulosin     1/3/18  No acute issues.  Continue Flomax as ordered    1/11/18  Chronic, will continue home dosing of Flomax and follow up with PCP.

## 2018-01-11 NOTE — ASSESSMENT & PLAN NOTE
wound care as prescribed     1/3/18  Seen by wound care this morning.    Appreciate recs.  Wound care to continue to following biweekly.    1/11/18  Seen and followed by wound care during admission.  Recommendations appreciated.  Will continue recommended wound care per recommendations.

## 2018-01-11 NOTE — PLAN OF CARE
01/11/2018  11:38 AM    Patient to discharge home today with assist of family. Patient set up with [facility] per CLARE Boland.     Future Appointments  Date Time Provider Department Center   1/17/2018 11:00 AM Thompson Stiles MD [facility] [place]   1/23/2018 2:30 PM Awilda Ho MD [facility]   2/16/2018 4:40 PM Brandon Johansen MD [facility]PHRO [facility]   3/22/2018 9:30 AM Thompson Stiles MD [facility] [place]        01/11/18 1138   Final Note   Assessment Type Final Discharge Note   Discharge Disposition Home   What phone number can be called within the next 1-3 days to see how you are doing after discharge? [phone]   Hospital Follow Up  Appt(s) scheduled? Yes   Discharge plans and expectations educations in teach back method with documentation complete? Yes     Kailee Horowitz RN, CM Skilled  [phone]

## 2018-01-11 NOTE — PLAN OF CARE
Problem: Fall Risk (Adult)  Goal: Absence of Falls  Patient will demonstrate the desired outcomes by discharge/transition of care.   Outcome: Ongoing (interventions implemented as appropriate)   01/10/18 2000   Fall Risk (Adult)   Absence of Falls making progress toward outcome       Problem: Pressure Ulcer Risk (Manan Scale) (Adult,Obstetrics,Pediatric)  Goal: Skin Integrity  Patient will demonstrate the desired outcomes by discharge/transition of care.    01/10/18 2000   Pressure Ulcer Risk (Manan Scale) (Adult,Obstetrics,Pediatric)   Skin Integrity making progress toward outcome

## 2018-01-11 NOTE — PLAN OF CARE
SW met with Pt at the bedside and told him he has been accepted by [facility].  Pt reports his son will pick him up around 1:00.  Pt had no questions for this SW.

## 2018-01-11 NOTE — ASSESSMENT & PLAN NOTE
rate controlled on carvedilol  continue apixaban     1/3/18  Continue Coreg and apixaban as outline in MD note, no acute issues.    1/11/18  No acute issues, HR controlled with b-blocker.  Continue apixaban per home dosing to prevent thromboembolism.

## 2018-01-11 NOTE — TELEPHONE ENCOUNTER
Lucie, please call pt and CANCEL his appt with me 1/17 at 11AM and reschedule him into [facility] for his Hospital follow up Appt.  Thanks

## 2018-01-11 NOTE — DISCHARGE SUMMARY
[facility]  Department of Hospital Medicine  Discharge Summary      Patient Name: Joseph Valerio  MRN: [id number]  Admission Date: 12/29/2017  Hospital Length of Stay: 13 days  Discharge Date and Time:  01/11/2018 3:13 PM  Attending Physician: Jaqueline Rodarte MD   Discharging Provider: Rafael Luevano NP  Primary Care Provider: Thompson Stiles MD    HPI:   Chief Complaint/Reason for Admission: Debility    History of Present Illness:  Patient is a 82 y.o. male with PMH of  Aortic sclerosis; BPH; CAD;  Chronic allergic rhinitis; Chronic anticoagulation - apixaban ; Chronic atrial fibrillation; CKD stage III; CPAP dependence; HLD; Hyperprolactinemia; Hypertension; Lumbar disc disease; Male hypogonadism; Obesity; Peripheral vascular disease; Pituitary microadenoma; Pulmonary hypertension; Renovascular hypertension; Sleep apnea; Traumatic rhabdomyolysis; Type 2 diabetes mellitus with stage 3 chronic kidney disease, with long-term current use of insulin; and Venous insufficiency. He was admitted for respiratory distress and altered mental status. Sats on admit were in the 80s. He has a history of tobacco use of 3.5 packs per day x 30 years and had worked as a  with asbestos exposure. He had been followed by his PCP and pulmonary and a CT chest earlier this month showed tree in bud nodularity and inflammation. He was seen by pulmonary in July but did not follow up. During his hospitalization he was treated for RLL PNA with augmentin and had clinical improvement. He had JOHN PAUL on CKD 3 but it resolved back to baseline, and a slight troponemia which cardiology attributed to demand ischemia as he was hypertensive on presentation with SBP in the 240s. He was recently hospitalized in November for rhabdomyolysis and was discharged home after a short stay. Patient has been working with PT/OT who recommend SNF for further balance/mobility training.         * No surgery found *      Hospital Course:    1/3/18  Patient seen for first time.  He has no complaints of cough, SOB or pain at this time.  He expresses desire to go home soon.      1/11/18  Patient seen at bedside, he states he is ready to go home.  He has no complaints at this time.     Consults         Status Ordering Provider     Inpatient consult to Registered Dietitian/Nutritionist  Once     Provider:  (Not yet assigned)    DANNY Hester          Significant Diagnostic Studies: Labs:   BMP:   Recent Labs  Lab 01/11/18  0416   GLU 99   *   K 4.7   *   CO2 28   BUN 54*   CREATININE 1.8*   CALCIUM 8.3*   MG 2.3   , CBC   Recent Labs  Lab 01/11/18  0416   WBC 6.63   HGB 8.9*   HCT 30.3*       and A1C:   Recent Labs  Lab 08/23/17  0716 12/13/17  1000   HGBA1C 6.0* 5.6       Pending Diagnostic Studies:     Procedure Component Value Units Date/Time    Basic Metabolic Panel (BMP) [[id number]]     Order Status:  Sent Lab Status:  No result     Specimen:  Blood from Blood     CBC auto differential [[id number]]     Order Status:  Sent Lab Status:  No result     Specimen:  Blood from Blood     Magnesium [[id number]]     Order Status:  Sent Lab Status:  No result     Specimen:  Blood from Blood     Phosphorus [[id number]]     Order Status:  Sent Lab Status:  No result     Specimen:  Blood from Blood         Final Active Diagnoses:    Diagnosis Date Noted POA    Unstageable pressure injury of skin and tissue [L89.95] 01/09/2018 Yes    Skin tear of right upper arm without complication [S41.111A] 01/09/2018 Yes    Pressure injury of deep tissue [L89.90] 01/06/2018 Yes    Arm wound, left, initial encounter [S41.102A] 12/30/2017 Yes    Renovascular hypertension [I15.0] 12/30/2017 Yes    Debility [R53.81] 12/29/2017 Yes    Stage 3 chronic kidney disease [N18.3] 11/07/2017 Yes     Chronic    Chronic anticoagulation - apixaban  [Z79.01] 11/07/2017 Not Applicable     Chronic    Persistent atrial fibrillation [I48.1] 10/16/2014 Yes      Chronic    Benign prostatic hyperplasia [N40.0] 07/31/2012 Yes     Chronic    Coronary artery disease involving native coronary artery of native heart without angina pectoris [I25.10] 07/31/2012 Yes     Chronic    Obstructive sleep apnea on CPAP [G47.33, Z99.89] 07/31/2012 Not Applicable     Chronic    Type 2 diabetes mellitus with stage 3 chronic kidney disease, with long-term current use of insulin [E11.22, N18.3, Z79.4] 07/31/2012 Not Applicable     Chronic    Hyperlipidemia [E78.5] 07/31/2012 Yes     Chronic    Venous stasis of lower extremity [I87.8] 07/31/2012 Yes      Problems Resolved During this Admission:    Diagnosis Date Noted Date Resolved POA    PRINCIPAL PROBLEM:  Pneumonia of right lower lobe due to infectious organism [J18.1] 12/26/2017 01/03/2018 Yes      Skin tear of right upper arm without complication    1/11/18  Continue treatment plan outlined by wound care.        Unstageable pressure injury of skin and tissue    1/11/18  Continue treatment plan outlined by wound care.        Pressure injury of deep tissue    1/11/18  Continue treatment plan outlined by wound care.        Renovascular hypertension    BP elevated  BP goal of < 140/90  continue carvedilol, furosemide, and losartan but titrate as needed   hydralazine as needed     1/3/18  BP this morning is 159/71 and HR is 52.  Per MD note, goal BP < 140/90  Currently on Coreg, Lasix and Losartan scheduled and Hydralazine PRN.    Unable to increase coreg due to low HR and Losartan is at max dosing.  Will scheduled Hydralazine and follow.    1/11/18  BP is 111/53 with target BP goal < 140/90.  Currently on Coreg, Lasix, Losartan will continue as ordered.  He was started on low dose hydralazine during admission with improvement in blood pressure readings, will continue at discharge.        Arm wound, left, initial encounter    wound care as prescribed     1/3/18  Seen by wound care this morning.    Appreciate recs.  Wound care to continue  to following biweekly.    1/11/18  Seen and followed by wound care during admission.  Recommendations appreciated.  Will continue recommended wound care per recommendations.        Debility    Cont with PT/OT for gait training and strengthening and restoration of ADL's   Fall precautions   Cont DVT prophylaxis with apixaban   Anticoagulants   Medication Route Frequency    apixaban tablet 2.5 mg Oral BID     1/3/18  Continue therapy to improved functional strength.  Per PT last note still has potential to move to a SBA level.    1/11/18  Seen and participated in therapy sessions, please see therapy notes for details.  Therapy is recommending home health.  Will ask  to visit in home setting to assist with community resources.        Chronic anticoagulation - apixaban     Plan as above    1/3/18  No acute issues.  Continue treatment with Apixaban.    1/11/18  Treatment as stated above.        Stage 3 chronic kidney disease    creatinine at baseline  avoid nephrotoxic agents     1/3/18  Last S. Cr is 1.6.  Continue to monitor biweekly labs.    1/11/18  S.Cr is 1.8, BUN at 54,  Encourage to increase oral fluid intake for next 48 hours.  Repeat BMP next week with results to PCP.        Persistent atrial fibrillation    rate controlled on carvedilol  continue apixaban     1/3/18  Continue Coreg and apixaban as outline in MD note, no acute issues.    1/11/18  No acute issues, HR controlled with b-blocker.  Continue apixaban per home dosing to prevent thromboembolism.          Coronary artery disease involving native coronary artery of native heart without angina pectoris    no acute issues  continue medical issues ASA, losartan, carvedilol, and furosemide     1/3/18  No acute issues.  Continue ASA, ARB, B-blocker and lasix as ordered by MD.  Denies chest pains at this time.    1/11/18  No acute issues.  Continue ASA, ARB, B-blocker and Lasix and follow up with treating provider after discharge.        Venous  stasis of lower extremity    Low Na diet  CHARO hose  Leg elevation     1/3/18  Chronic, continue medical plan outlined by MD.    Patient to follow up with treating provider after discharge, no acute issues.    1/11/18  Chronic, follow up with PCP for long-term management.          Obstructive sleep apnea on CPAP    does not use CPAP at home   O2 as needed   Follow up as outpatient with sleep medicine     1/3/18  Per MD note, follow up with sleep clinic for sleep study.      1/11/18  Patient will need ambulatory referral to sleep clinic for sleep study, PCP to arrange.        Benign prostatic hyperplasia    continue tamsulosin     1/3/18  No acute issues.  Continue Flomax as ordered    1/11/18  Chronic, will continue home dosing of Flomax and follow up with PCP.        Hyperlipidemia    Cont home statin to treat     1/3/18  Chronic, no acute issues, review of chart he is not on statin therapy.  Lipid panel from Dec 2017 reviewed and appears normal.    1/11/18  Chronic, he will follow up with his PCP after discharge.  He is not on any lipid lowering agents at this time, lipid panel from Dec 2017 appears normal.        Type 2 diabetes mellitus with stage 3 chronic kidney disease, with long-term current use of insulin    blood glucoses well controlled well controlled   home dose of glargine held as his A1C with WNL   continue with accuchecks and SSI     1/3/18  Lab Results   Component Value Date    HGBA1C 5.6 12/13/2017     POCT Glucose   Date Value Ref Range Status   01/11/2018 159 (H) 70 - 110 mg/dL Final   01/11/2018 108 70 - 110 mg/dL Final   01/10/2018 228 (H) 70 - 110 mg/dL Final   01/10/2018 137 (H) 70 - 110 mg/dL Final   01/10/2018 181 (H) 70 - 110 mg/dL Final   01/10/2018 125 (H) 70 - 110 mg/dL Final   01/09/2018 263 (H) 70 - 110 mg/dL Final   01/09/2018 139 (H) 70 - 110 mg/dL Final   01/09/2018 148 (H) 70 - 110 mg/dL Final   01/09/2018 193 (H) 70 - 110 mg/dL Final   01/08/2018 180 (H) 70 - 110 mg/dL Final  "  01/08/2018 140 (H) 70 - 110 mg/dL Final     Hgba1c is normal, continue diet restriction and Novolog per SSI as outlined by MD and follow.    1/11/18  Patient started on low dose Levemir by physician over weekend.  Since then his CBG has waxed and waned.  He takes lantus 25 units at home daily.  He is currently on Levemir 7 units but will change to 9 units qhs at discharge.  Recommend he follow a diabetic diet and to keep record of his blood sugar.  He is to notify his PCP/endocrinologist for any issues.              Discharged Condition: stable    Disposition: Home-Health Care [facility]    Follow Up:  Follow-up Information     Thompson Stiles MD. Go on 1/17/2018.    Specialty:  Internal Medicine  Why:  Hospital follow up  Contact information:  [address]  [facility] [address]  [phone]             Awilda Ho MD. Go on 1/23/2018.    Specialty:  Pulmonary Disease  Why:  Follow up  Contact information:  [address]  [facility] [address]  [phone]             Brandon Johansen MD. Go on 2/16/2018.    Specialties:  Nephrology, Endocrinology  Contact information:  [address]  [facility] [id number] [phone]             [facility].    Specialty:  Home Health Services  Why:  Home Health Questions/Concerns -  nurse will make home vist for admission assessment on 1/12/18.  Contact information:  [address] [place] [id number] [phone]                 Patient Instructions:     3 IN 1 COMMODE FOR HOME USE   Order Specific Question Answer Comments   Type: Heavy duty    Height: 5' 10" (1.778 m)    Weight: 101.9 kg (224 lb 10.4 oz)    Does patient have medical equipment at home? cane, straight    Does patient have medical equipment at home? grab bar    Does patient have medical equipment at home? walker, rolling    Length of need (1-99 months): 99    Vendor: Other (use comments) [employer]    Expected Date of Delivery: 1/10/2018      BATH/SHOWER CHAIR FOR HOME USE " "  Order Specific Question Answer Comments   Height: 5' 10" (1.778 m)    Weight: 101.9 kg (224 lb 10.4 oz)    Does patient have medical equipment at home? cane, straight    Does patient have medical equipment at home? grab bar    Does patient have medical equipment at home? walker, rolling    Length of need (1-99 months): 99    Type: With back    Vendor: [facility]    Expected Date of Delivery: 1/10/2018      Diet Cardiac (2gm sodium and 60gm fat)     Diet Diabetic 2000 Calories     Activity as tolerated     Notify your health care provider if you experience any of the following:  temperature >100.4     Notify your health care provider if you experience any of the following:  persistent nausea and vomiting or diarrhea     Notify your health care provider if you experience any of the following:  severe uncontrolled pain     Notify your health care provider if you experience any of the following:  difficulty breathing or increased cough     Notify your health care provider if you experience any of the following:  severe persistent headache     Notify your health care provider if you experience any of the following:  worsening rash     Notify your health care provider if you experience any of the following:  persistent dizziness, light-headedness, or visual disturbances     Notify your health care provider if you experience any of the following:  increased confusion or weakness     Change dressing (specify)   Order Comments: Sacral pressure injury:  Nursing to cleanse sacral area with cleansing cloths and apply Triad ointment ( yellow tube) BID and prn cleaning.    Right shoulder pressure injury: weekly- clean with normal saline, apply Comfeel transparent dressing over wound area.    Right arm skin tear: weekly- clean with normal saline, dress with mepilex foam dressing       Medications:  Reconciled Home Medications:   Discharge Medication List as of 1/11/2018  1:59 PM      START taking these medications    Details "   amLODIPine (NORVASC) 2.5 MG tablet Take 1 tablet (2.5 mg total) by mouth once daily., Starting Thu 1/11/2018, Until Fri 1/11/2019, Normal      aspirin (ECOTRIN) 81 MG EC tablet Take 1 tablet (81 mg total) by mouth once daily., Starting Thu 1/11/2018, Until Fri 1/11/2019, OTC      carvedilol (COREG) 12.5 MG tablet Take 1 tablet (12.5 mg total) by mouth 2 (two) times daily., Starting Wed 1/10/2018, Until Thu 1/10/2019, Normal      hydrALAZINE (APRESOLINE) 10 MG tablet Take 1 tablet (10 mg total) by mouth every 8 (eight) hours., Starting Wed 1/10/2018, Until Thu 1/10/2019, Normal      oxyCODONE (ROXICODONE) 5 MG immediate release tablet Take 1 tablet (5 mg total) by mouth every 8 (eight) hours as needed for Pain., Starting Wed 1/10/2018, Normal      senna-docusate 8.6-50 mg (PERICOLACE) 8.6-50 mg per tablet Take 1 tablet by mouth 2 (two) times daily., Starting Wed 1/10/2018, OTC         CONTINUE these medications which have CHANGED    Details   insulin glargine (LANTUS SOLOSTAR) 100 unit/mL (3 mL) InPn pen 9 units SQ in AM for Diabetes, No Print         CONTINUE these medications which have NOT CHANGED    Details   apixaban 2.5 mg Tab Take 1 tablet (2.5 mg total) by mouth 2 (two) times daily., Starting Mon 7/10/2017, Normal      blood sugar diagnostic Strp Check glucose 2-3x/day before meals and/or bedtime, Normal      calcium-vitamin D 600 mg(1,500mg) -400 unit Tab 1 tab daily, Print      ferrous sulfate dried (SLOW FE) 160 mg (50 mg iron) TbSR Take 1 tablet (160 mg total) by mouth once daily., Starting 7/15/2014, Until Discontinued, No Print      furosemide (LASIX) 40 MG tablet Take 1 tablet (40 mg total) by mouth once daily., Starting Wed 7/5/2017, Normal      gabapentin (NEURONTIN) 300 MG capsule TAKE 1 CAPSULE BY MOUTH AT BEDTIME FOR DIABETIC NEUROPATHY, Normal      lancets (ACCU-CHEK SOFTCLIX LANCETS) Misc 1 strip by Misc.(Non-Drug; Combo Route) route 2 (two) times daily., Starting 8/25/2014, Until  Discontinued, Normal      losartan (COZAAR) 100 MG tablet Take 1 tablet (100 mg total) by mouth once daily., Starting Wed 11/8/2017, Until Thu 11/8/2018, Normal      multivitamin (THERAGRAN) per tablet Take 1 tablet by mouth once daily., Starting Mon 11/20/2017, OTC      tamsulosin (FLOMAX) 0.4 mg Cp24 Take 1 capsule (0.4 mg total) by mouth once daily. 1 Capsule, Sust. Release 24 hr Oral Every day.  for prostate, Starting Wed 7/5/2017, Normal      timolol maleate 0.5% (TIMOPTIC) 0.5 % Drop Place 1 drop into both eyes once daily. , Starting Thu 8/28/2014, Historical Med      TRAVATAN Z 0.004 % Drop Place 1 drop into both eyes every evening. , Starting Thu 8/28/2014, Historical Med      walker Misc 1 Rolling Walker with a Seat, Print         STOP taking these medications       acetaminophen (TYLENOL) 500 MG tablet Comments:   Reason for Stopping:         COREG CR 20 mg 24 hr capsule Comments:   Reason for Stopping:         lansoprazole (PREVACID SOLUTAB) 30 MG disintegrating tablet Comments:   Reason for Stopping:         oxyCODONE (OXYCONTIN) 30 mg TR12 12 hr tablet Comments:   Reason for Stopping:             Time spent on the discharge of patient: 45 minutes    Rafael Luevano NP  Department of Hospital Medicine  [facility]

## 2018-01-11 NOTE — TELEPHONE ENCOUNTER
Adali please call Ani:  Yes that's fine,but initial Home Health orders need to come from Rafael Luevano upon pt's D/C from [facility].  Thanks

## 2018-01-12 ENCOUNTER — PATIENT OUTREACH (OUTPATIENT)
Dept: ADMINISTRATIVE | Facility: CLINIC | Age: 83
End: 2018-01-12

## 2018-01-12 NOTE — PATIENT INSTRUCTIONS
Sepsis     To treat sepsis, antibiotics and fluids may by given through an intravenous (IV) line.     Sepsis happens when your body responds with widespread inflammation to a bad infection or bacteremia--the presence of bacteria in your bloodstream. Sepsis can be deadly. Blood pressure may drop and the lungs and kidneys may start to fail. Emergency care for sepsis is crucial.  Risk factors  Those most at risk for sepsis are:  · Infants or older adults  · People who have an illness that weakens their immune system, such as cancer, AIDS, or diabetes  · People being treated with chemotherapy medicines or radiation, which weakens the immune system  · People who have had a transplant  · People with a very severe infection such as pneumonia, meningitis, or a urinary tract infection  When to go to the emergency department (ED)  Sepsis is an emergency. Go to the nearest ED if you have a fever with any of these symptoms:  · Chills and shaking  · Rapid heartbeat and breathing  · Trouble breathing  · Severe nausea or uncontrolled vomiting  · Confusion, disorientation, drowsiness, or dizziness  · Decreased urination  · Severe pain, including in the back or joints   What to expect in the ED  · Blood and urine tests are done to look for bacteria. They also check for organ failure.  · Blood, urine, or sputum cultures may be taken. The samples are sent to a lab. They are placed in a special container. Any bacteria should grow in 24 hours.  · X-rays or other imaging tests may be done.  A person with sepsis will be admitted to the hospital and treated with antibiotics. Treatment may also include oxygen and intravenous fluids.  Date Last Reviewed: 10/1/2016  © 9543-6376 [employer]. [address] [place] [address], [place] [address]. All rights reserved. This information is not intended as a substitute for professional medical care. Always follow your healthcare professional's instructions.        Preventing Common Respiratory  Infections  Respiratory infections such as colds and influenza (the flu) are common in winter. These infections are often caused by viruses. They may share some symptoms, but not all respiratory infections are the same. Some make you more sick than others. You can take steps to prevent common respiratory infections. And if you get sick, you can take care of yourself to keep the infection from getting worse.    What is a cold?  · Symptoms include runny nose, coughing and sneezing, and sore throat. Cold symptoms tend to be milder than flu symptoms.  · Symptoms tend to come on slowly. They last for a few days to about a week.  · With a cold, you can still do most of the things you usually do.  What is the flu?  · Symptoms include fever, headache, fatigue, cough, sore throat, runny nose, and muscle aches. Children may have upset stomach and vomiting, but adults usually dont.  · Symptoms tend to come on quickly. Some, such as fatigue and cough, can last a few weeks.  · With the flu, you may feel worn out and not able to do normal activities.  · Its most likely NOT the flu if an adult has vomiting or diarrhea for a day or two. This so-called stomach flu is probably a GI (gastrointestinal) infection.  When the infection gets worse  Without proper care, a respiratory infection can get worse. It can lead to serious complications and death. If you arent getting better, call your healthcare provider. Complications can include:  · Bronchitis (infection of the airways that leads to shortness of breath and coughing up thick yellow or green mucus)  · Pneumonia (infection of the lungs in which fluid and mucus settle in the lungs, making breathing difficult)  · Worsening of chronic conditions such as heart failure, chronic lung disease, asthma, or diabetes  · Severe dehydration (loss of fluids)  · Sinus problems  · Ear infections   Get a flu vaccine  A flu vaccine protects you from influenza (but not other colds or  infections). Get a vaccine each fall, before flu season starts. This can be done at a clinic, healthcare providers office, drugstore, senior center, or through your workplace.  Get pneumococcal vaccines  Pneumonia can be a complication of influenza. There are 2 pneumococcal pneumonia vaccines that protect against many types of pneumonia. Talk with your healthcare provider about these important vaccines.   Keep germs from spreading  No one likes getting sick. To protect yourself and others from cold and flu germs:  · Wash your hands often. Use alcohol-based hand  when you dont have access to soap and water.  · Dont touch your eyes, nose, and mouth. This may help you keep germs out of your body.  · Try to avoid people with respiratory infections. You may want to stay out of crowds during flu season (winter).  · Ask your healthcare provider if you should get a pneumonia vaccination.  How to wash your hands  · Use warm water and plenty of soap. Work up a good lather.  · Clean your whole hand, under your nails, between your fingers, and up your wrists. Wash for at least 15 to 20 seconds. Dont just wipe--rub well.  · Rinse. Let the water run down your fingertips, not up your wrists.  · In a public restroom, use a paper towel to turn off the faucet and open the door.   Date Last Reviewed: 12/1/2016  © 4555-5991 [employer]. [address] [place] [address], [place] [address]. All rights reserved. This information is not intended as a substitute for professional medical care. Always follow your healthcare professional's instructions.

## 2018-01-15 ENCOUNTER — TELEPHONE (OUTPATIENT)
Dept: INTERNAL MEDICINE | Facility: CLINIC | Age: 83
End: 2018-01-15

## 2018-01-15 NOTE — TELEPHONE ENCOUNTER
Spoke with son attempting to make an appt for father who just was discharged from  SNF Son stated there was no need to make appt in our clinic

## 2018-01-17 NOTE — PT/OT/SLP PROGRESS
Occupational Therapy  Treatment    Joseph Valerio   MRN: [id number]   Admitting Diagnosis: Pneumonia of right lower lobe due to infectious organism    OT Date of Treatment: 01/02/18  Total Time (min): 48 min    Billable Minutes:  Therapeutic Exercise 53    General Precautions: Standard, fall  Orthopedic Precautions: N/A  Braces: N/A    Do you have any cultural, spiritual, Mu-ism conflicts, given your current situation?: no    Subjective:  Communicated with nursing prior to session.    Objective:   Pt was seen to gym to for therapeutic exercise, standing balance, standing tolerance and BUE strengthening focusing on improved component skills required to improve safe and feasible performance of ADL's.    OT Exercises: UE Ergometer 6 min    Patient left up in chair with call button in reach    ASSESSMENT:  Joseph Valerio is a 82 y.o. male with a medical diagnosis of Pneumonia of right lower lobe due to infectious organism who is steadily improving in functional mobility, .    Rehab identified problem list/impairments: weakness, impaired endurance, impaired self care skills, impaired functional mobilty, gait instability, impaired balance, decreased safety awareness, edema    Rehab potential is good    Activity tolerance: Good    Discharge recommendations: home with home health     Barriers to discharge: Decreased caregiver support     Equipment recommendations: bedside commode, shower chair     GOALS:    Occupational Therapy Goals          Problem: Occupational Therapy Goal    Goal Priority Disciplines Outcome Interventions   Occupational Therapy Goal     OT, PT/OT Unable to achieve outcome(s) by discharge    Description:  Goals to be met by: 14 days    Patient will increase functional independence with ADLs by performing:    UE Dressing with [place].  LE Dressing with Modified [place].  Grooming while standing with Modified [place]. Met  Toileting from toilet with Modified [place] for hygiene  and clothing management.   Bathing from  shower chair/bench with Modified [place].  Toilet transfer to toilet with Modified [place].  Standing tolerance 10 minutes to complete standing ADL and IADL tasks.                           Plan:  Patient to be seen 5 x/week to address the above listed problems via self-care/home management, therapeutic activities, therapeutic exercises  Plan of Care expires:    Plan of Care reviewed with: patient    Shanta Atnony, OT  01/17/2018

## 2018-01-25 ENCOUNTER — TELEPHONE (OUTPATIENT)
Dept: INTERNAL MEDICINE | Facility: CLINIC | Age: 83
End: 2018-01-25

## 2018-01-25 NOTE — TELEPHONE ENCOUNTER
----- Message from Sarah Bautista sent at 1/25/2018  9:50 AM CST -----  Contact: [facility] @ [phone]  They need the plan of care form fax back to them it was fax on 1-,please advise

## 2020-03-07 NOTE — ASSESSMENT & PLAN NOTE
1/11/18  Continue treatment plan outlined by wound care.   Prescriber Notification Note    The pharmacist has communicated with this patient's provider regarding a concern or therapy recommendation.    Notified Person: BRANDON Murcia  Date/Time of Notification: 3/7/20 0844  Interaction: text page  Concern/Recommendation:he has not taken his meds in over a month. Asked if trazodone dose at hs should be lower than 200mg and needed order of preference for prn sleep (melatonin, seroquel)     Comments/Additional Details:received new orders for lower dose trazodone and prn clarification

## 2023-05-10 NOTE — ED NOTES
Eligibility verified   Pt is sleeping comfortably. Respirations unlabored. NAD noted. Awaiting bed assignment